# Patient Record
Sex: FEMALE | Race: BLACK OR AFRICAN AMERICAN | Employment: UNEMPLOYED | URBAN - METROPOLITAN AREA
[De-identification: names, ages, dates, MRNs, and addresses within clinical notes are randomized per-mention and may not be internally consistent; named-entity substitution may affect disease eponyms.]

---

## 2018-06-10 ENCOUNTER — ED HISTORICAL/CONVERTED ENCOUNTER (OUTPATIENT)
Dept: OTHER | Age: 23
End: 2018-06-10

## 2018-08-08 ENCOUNTER — ED HISTORICAL/CONVERTED ENCOUNTER (OUTPATIENT)
Dept: OTHER | Age: 23
End: 2018-08-08

## 2018-10-12 ENCOUNTER — ED HISTORICAL/CONVERTED ENCOUNTER (OUTPATIENT)
Dept: OTHER | Age: 23
End: 2018-10-12

## 2021-08-24 ENCOUNTER — HOSPITAL ENCOUNTER (EMERGENCY)
Age: 26
Discharge: HOME | End: 2021-08-24
Attending: EMERGENCY MEDICINE
Payer: MEDICAID

## 2021-08-24 VITALS
HEART RATE: 97 BPM | OXYGEN SATURATION: 100 % | TEMPERATURE: 98.6 F | HEIGHT: 67 IN | DIASTOLIC BLOOD PRESSURE: 72 MMHG | SYSTOLIC BLOOD PRESSURE: 107 MMHG | RESPIRATION RATE: 18 BRPM | BODY MASS INDEX: 34.06 KG/M2 | WEIGHT: 217 LBS

## 2021-08-24 DIAGNOSIS — A59.01 TRICHOMONAS VAGINITIS: Primary | ICD-10-CM

## 2021-08-24 LAB
APPEARANCE UR: CLEAR
BILIRUB UR QL: NEGATIVE
COLOR UR: YELLOW
GLUCOSE UR STRIP.AUTO-MCNC: >1000 MG/DL
HCG UR QL: NEGATIVE
HGB UR QL STRIP: NEGATIVE
KETONES UR QL STRIP.AUTO: 15 MG/DL
KOH PREP SPEC: NORMAL
LEUKOCYTE ESTERASE UR QL STRIP.AUTO: NEGATIVE
NITRITE UR QL STRIP.AUTO: NEGATIVE
PH UR STRIP: 5 [PH] (ref 5–8)
PROT UR STRIP-MCNC: NEGATIVE MG/DL
SP GR UR REFRACTOMETRY: 1.02 (ref 1–1.03)
SPECIAL REQUESTS,SREQ: NORMAL
TRICHOMONAS RAPID AG, TRICR: POSITIVE
UROBILINOGEN UR QL STRIP.AUTO: 0.1 EU/DL (ref 0.2–1)

## 2021-08-24 PROCEDURE — 99283 EMERGENCY DEPT VISIT LOW MDM: CPT

## 2021-08-24 PROCEDURE — 87491 CHLMYD TRACH DNA AMP PROBE: CPT

## 2021-08-24 PROCEDURE — 87210 SMEAR WET MOUNT SALINE/INK: CPT

## 2021-08-24 PROCEDURE — 81003 URINALYSIS AUTO W/O SCOPE: CPT

## 2021-08-24 PROCEDURE — 74011250637 HC RX REV CODE- 250/637: Performed by: EMERGENCY MEDICINE

## 2021-08-24 PROCEDURE — 81025 URINE PREGNANCY TEST: CPT

## 2021-08-24 PROCEDURE — 87808 TRICHOMONAS ASSAY W/OPTIC: CPT

## 2021-08-24 RX ORDER — DOXYCYCLINE 100 MG/1
100 CAPSULE ORAL ONCE
Status: COMPLETED | OUTPATIENT
Start: 2021-08-24 | End: 2021-08-24

## 2021-08-24 RX ORDER — DOXYCYCLINE 100 MG/1
100 CAPSULE ORAL 2 TIMES DAILY
Qty: 14 CAPSULE | Refills: 0 | Status: SHIPPED | OUTPATIENT
Start: 2021-08-24

## 2021-08-24 RX ORDER — MEDROXYPROGESTERONE ACETATE 150 MG/ML
150 INJECTION, SUSPENSION INTRAMUSCULAR ONCE
COMMUNITY

## 2021-08-24 RX ORDER — VENLAFAXINE 75 MG/1
25 TABLET ORAL 3 TIMES DAILY
COMMUNITY

## 2021-08-24 RX ORDER — INSULIN GLARGINE 100 [IU]/ML
20 INJECTION, SOLUTION SUBCUTANEOUS
COMMUNITY

## 2021-08-24 RX ORDER — METRONIDAZOLE 500 MG/1
2000 TABLET ORAL
Status: COMPLETED | OUTPATIENT
Start: 2021-08-24 | End: 2021-08-24

## 2021-08-24 RX ORDER — ATORVASTATIN CALCIUM 20 MG/1
TABLET, FILM COATED ORAL DAILY
COMMUNITY

## 2021-08-24 RX ORDER — METFORMIN HYDROCHLORIDE 850 MG/1
850 TABLET ORAL 2 TIMES DAILY WITH MEALS
COMMUNITY

## 2021-08-24 RX ORDER — OMEPRAZOLE 40 MG/1
40 CAPSULE, DELAYED RELEASE ORAL DAILY
COMMUNITY

## 2021-08-24 RX ADMIN — DOXYCYCLINE HYCLATE 100 MG: 100 CAPSULE ORAL at 02:14

## 2021-08-24 RX ADMIN — METRONIDAZOLE 2000 MG: 500 TABLET ORAL at 02:14

## 2021-08-24 NOTE — ED PROVIDER NOTES
EMERGENCY DEPARTMENT HISTORY AND PHYSICAL EXAM      Date: 2021  Patient Name: Gwendel Felty    History of Presenting Illness     Chief Complaint   Patient presents with    Vaginal Pain       History Provided By: Patient    HPI: Gwendel Felty, 27 y.o. female   presents to the ED with cc of vaginal discharge. Patient complains of vaginal discharge and irritation for last several days. Patient is recently had unprotected sexual intercourse. Patient states that she had a vaginal yeast infection before with a similar symptom. Patient was recently switched to insulin from Metformin for her diabetes and the Accu-Chek has been fluctuating from 200-400. No fever chills. No abdominal pain. PCP: None    No current facility-administered medications on file prior to encounter. Current Outpatient Medications on File Prior to Encounter   Medication Sig Dispense Refill    omeprazole (PRILOSEC) 40 mg capsule Take 40 mg by mouth daily.  atorvastatin (LIPITOR) 20 mg tablet Take  by mouth daily.  metFORMIN (GLUCOPHAGE) 850 mg tablet Take 850 mg by mouth two (2) times daily (with meals).  venlafaxine (EFFEXOR) 75 mg tablet Take 25 mg by mouth three (3) times daily.  insulin lispro protamine/insulin lispro (HumaLOG Mix 75-25,U-100,Insuln) 100 unit/mL (75-25) injection by SubCUTAneous route.  insulin glargine (Lantus Solostar U-100 Insulin) 100 unit/mL (3 mL) inpn 20 Units by SubCUTAneous route nightly.  medroxyPROGESTERone (DEPO-PROVERA) 150 mg/mL syrg 150 mg by IntraMUSCular route once. Past History     Past Medical History:  Past Medical History:   Diagnosis Date    Anxiety     Depression     Diabetes ([address]     GERD (gastroesophageal reflux disease)     High cholesterol     Hypertension        Past Surgical History:  Past Surgical History:   Procedure Laterality Date    HX  SECTION         Family History:  History reviewed.  No pertinent family history. Social History:  Social History     Tobacco Use    Smoking status: Current Every Day Smoker    Smokeless tobacco: Never Used   Substance Use Topics    Alcohol use: Not Currently    Drug use: Not Currently       Allergies:  No Known Allergies      Review of Systems   Review of Systems   Constitutional: Negative for chills and fever. HENT: Negative for sore throat. Respiratory: Negative for shortness of breath. Cardiovascular: Negative for chest pain. Gastrointestinal: Negative for nausea. Endocrine: Negative for polyuria. Genitourinary: Negative for dysuria. Skin: Negative for rash. Neurological: Negative for headaches. Psychiatric/Behavioral: Negative for suicidal ideas. All other systems reviewed and are negative. Physical Exam   Physical Exam  Vitals and nursing note reviewed. Constitutional:       Appearance: Normal appearance. HENT:      Head: Normocephalic and atraumatic. Nose: Nose normal.      Mouth/Throat:      Mouth: Mucous membranes are moist.      Pharynx: Oropharynx is clear. Eyes:      Conjunctiva/sclera: Conjunctivae normal.   Cardiovascular:      Rate and Rhythm: Normal rate and regular rhythm. Heart sounds: Normal heart sounds. Pulmonary:      Effort: Pulmonary effort is normal.      Breath sounds: Normal breath sounds. Abdominal:      General: Abdomen is flat. Bowel sounds are normal.      Palpations: Abdomen is soft. Musculoskeletal:      Cervical back: Neck supple. Right lower leg: No edema. Left lower leg: No edema. Skin:     General: Skin is warm and dry. Neurological:      General: No focal deficit present. Mental Status: She is alert and oriented to person, place, and time.    Psychiatric:         Mood and Affect: Mood normal.         Diagnostic Study Results     Labs -     Recent Results (from the past 12 hour(s))   HCG URINE, QL    Collection Time: 08/24/21  1:25 AM   Result Value Ref Range    HCG urine, QL Negative Negative     URINALYSIS W/ RFLX MICROSCOPIC    Collection Time: 08/24/21  1:25 AM   Result Value Ref Range    Color Yellow      Appearance Clear Clear      Specific gravity 1.020 1.003 - 1.030      pH (UA) 5.0 5.0 - 8.0      Protein Negative Negative mg/dL    Glucose >1,000 (A) Negative mg/dL    Ketone 15 (A) Negative mg/dL    Bilirubin Negative Negative      Blood Negative Negative      Urobilinogen 0.1 (L) 0.2 - 1.0 EU/dL    Nitrites Negative Negative      Leukocyte Esterase Negative Negative     KOH, OTHER SOURCES    Collection Time: 08/24/21  1:30 AM    Specimen: Vagina; Other   Result Value Ref Range    Special Requests: No Special Requests      KOH No yeast seen     TRICHOMONAS RAPID AG    Collection Time: 08/24/21  1:30 AM   Result Value Ref Range    Trichomonas, rapid Ag Positive (A) Negative         Radiologic Studies -   No orders to display     CT Results  (Last 48 hours)    None        CXR Results  (Last 48 hours)    None            Medical Decision Making   I am the first provider for this patient. I reviewed the vital signs, available nursing notes, past medical history, past surgical history, family history and social history. Vital Signs-Reviewed the patient's vital signs. Patient Vitals for the past 12 hrs:   Temp Pulse Resp BP SpO2   08/24/21 0112 98.6 °F (37 °C) 97 18 107/72 100 %       Records Reviewed:     Provider Notes (Medical Decision Making):       ED Course:   Initial assessment performed. The patients presenting problems have been discussed, and they are in agreement with the care plan formulated and outlined with them. I have encouraged them to ask questions as they arise throughout their visit. PROCEDURES      Disposition: Condition stable   DC- Adult Discharges: All of the diagnostic tests were reviewed and questions answered. Diagnosis, care plan and treatment options were discussed. understand instructions and will follow up as directed.  The patients results have been reviewed with them. They have been counseled regarding their diagnosis. The patient verbally convey understanding and agreement of the signs, symptoms, diagnosis, treatment and prognosis and additionally agrees to follow up as recommended. They also agree with the care-plan and convey that all of their questions have been answered. I have also put together some discharge instructions for them that include: 1) educational information regarding their diagnosis, 2) how to care for their diagnosis at home, as well a 3) list of reasons why they would want to return to the ED prior to their follow-up appointment, should their condition change. PLAN:  1. Current Discharge Medication List      START taking these medications    Details   doxycycline (VIBRAMYCIN) 100 mg capsule Take 1 Capsule by mouth two (2) times a day. Qty: 14 Capsule, Refills: 0  Start date: 8/24/2021           2. Follow-up Information     Follow up With Specialties Details Why Contact Info    Follow up with your primary care physician  Schedule an appointment as soon as possible for a visit in 3 days As needed         Return to ED if worse     Diagnosis     Clinical Impression:   1. Trichomonas vaginitis        Please note that this dictation was completed with [employer], the computer voice recognition software. Quite often unanticipated grammatical, syntax, homophones, and other interpretive errors are inadvertently transcribed by the computer software. Please disregard these errors. Please excuse any errors that have escaped final proofreading. Thank you.

## 2021-08-26 LAB
C TRACH RRNA SPEC QL NAA+PROBE: NEGATIVE
N GONORRHOEA RRNA SPEC QL NAA+PROBE: NEGATIVE
PLEASE NOTE:, 188601: NORMAL
SPECIMEN SOURCE: NORMAL

## 2021-12-05 ENCOUNTER — HOSPITAL ENCOUNTER (EMERGENCY)
Age: 26
Discharge: HOME | End: 2021-12-05
Attending: EMERGENCY MEDICINE | Admitting: EMERGENCY MEDICINE
Payer: MEDICAID

## 2021-12-05 VITALS
HEIGHT: 67 IN | OXYGEN SATURATION: 98 % | DIASTOLIC BLOOD PRESSURE: 62 MMHG | RESPIRATION RATE: 18 BRPM | SYSTOLIC BLOOD PRESSURE: 117 MMHG | TEMPERATURE: 98.5 F | BODY MASS INDEX: 34.06 KG/M2 | HEART RATE: 74 BPM | WEIGHT: 217 LBS

## 2021-12-05 DIAGNOSIS — N89.8 VAGINAL ITCHING: Primary | ICD-10-CM

## 2021-12-05 DIAGNOSIS — B37.31 VAGINAL YEAST INFECTION: ICD-10-CM

## 2021-12-05 LAB
APPEARANCE UR: ABNORMAL
BACTERIA URNS QL MICRO: ABNORMAL /HPF
BILIRUB UR QL: NEGATIVE
COLOR UR: YELLOW
EPITH CASTS URNS QL MICRO: ABNORMAL /LPF
GLUCOSE UR STRIP.AUTO-MCNC: >1000 MG/DL
HCG UR QL: NEGATIVE
HGB UR QL STRIP: NEGATIVE
KETONES UR QL STRIP.AUTO: 15 MG/DL
KOH PREP SPEC: NORMAL
LEUKOCYTE ESTERASE UR QL STRIP.AUTO: NEGATIVE
NITRITE UR QL STRIP.AUTO: NEGATIVE
PH UR STRIP: 5 [PH] (ref 5–8)
PROT UR STRIP-MCNC: NEGATIVE MG/DL
RBC #/AREA URNS HPF: ABNORMAL /HPF (ref 0–5)
SP GR UR REFRACTOMETRY: 1.02 (ref 1–1.03)
SPECIAL REQUESTS,SREQ: NORMAL
TRICHOMONAS RAPID AG, TRICR: NEGATIVE
UA: UC IF INDICATED,UAUC: ABNORMAL
UROBILINOGEN UR QL STRIP.AUTO: 0.1 EU/DL (ref 0.2–1)
WBC URNS QL MICRO: ABNORMAL /HPF (ref 0–4)

## 2021-12-05 PROCEDURE — 87808 TRICHOMONAS ASSAY W/OPTIC: CPT

## 2021-12-05 PROCEDURE — 99284 EMERGENCY DEPT VISIT MOD MDM: CPT

## 2021-12-05 PROCEDURE — 87210 SMEAR WET MOUNT SALINE/INK: CPT

## 2021-12-05 PROCEDURE — 87491 CHLMYD TRACH DNA AMP PROBE: CPT

## 2021-12-05 PROCEDURE — 74011250637 HC RX REV CODE- 250/637: Performed by: EMERGENCY MEDICINE

## 2021-12-05 PROCEDURE — 81001 URINALYSIS AUTO W/SCOPE: CPT

## 2021-12-05 PROCEDURE — 81025 URINE PREGNANCY TEST: CPT

## 2021-12-05 RX ORDER — HYDROCORTISONE 0.5 %
OINTMENT (GRAM) TOPICAL 2 TIMES DAILY
Qty: 15 G | Refills: 1 | Status: SHIPPED | OUTPATIENT
Start: 2021-12-05 | End: 2021-12-15

## 2021-12-05 RX ORDER — ACETAMINOPHEN 500 MG
1000 TABLET ORAL ONCE
Status: COMPLETED | OUTPATIENT
Start: 2021-12-05 | End: 2021-12-05

## 2021-12-05 RX ORDER — FLUCONAZOLE 100 MG/1
150 TABLET ORAL
Status: COMPLETED | OUTPATIENT
Start: 2021-12-05 | End: 2021-12-05

## 2021-12-05 RX ORDER — DIPHENHYDRAMINE HCL 25 MG
50 CAPSULE ORAL ONCE
Status: COMPLETED | OUTPATIENT
Start: 2021-12-05 | End: 2021-12-05

## 2021-12-05 RX ORDER — FLUCONAZOLE 150 MG/1
150 TABLET ORAL
Qty: 1 TABLET | Refills: 0 | Status: SHIPPED | OUTPATIENT
Start: 2021-12-05 | End: 2021-12-05

## 2021-12-05 RX ORDER — CHLORPHENIRAMINE MALEATE 4 MG
TABLET ORAL 2 TIMES DAILY
Qty: 15 G | Refills: 0 | Status: SHIPPED | OUTPATIENT
Start: 2021-12-05 | End: 2022-01-04

## 2021-12-05 RX ORDER — DIPHENHYDRAMINE HCL 25 MG
50 CAPSULE ORAL
Qty: 10 CAPSULE | Refills: 0 | Status: SHIPPED | OUTPATIENT
Start: 2021-12-05 | End: 2021-12-15

## 2021-12-05 RX ORDER — IBUPROFEN 800 MG/1
800 TABLET ORAL ONCE
Status: COMPLETED | OUTPATIENT
Start: 2021-12-05 | End: 2021-12-05

## 2021-12-05 RX ADMIN — DIPHENHYDRAMINE HYDROCHLORIDE 50 MG: 25 CAPSULE ORAL at 23:03

## 2021-12-05 RX ADMIN — ACETAMINOPHEN 1000 MG: 500 TABLET ORAL at 23:03

## 2021-12-05 RX ADMIN — IBUPROFEN 800 MG: 800 TABLET, FILM COATED ORAL at 23:03

## 2021-12-05 RX ADMIN — FLUCONAZOLE 150 MG: 100 TABLET ORAL at 23:03

## 2021-12-06 NOTE — ED PROVIDER NOTES
EMERGENCY DEPARTMENT HISTORY AND PHYSICAL EXAM      Date: 12/5/2021  Patient Name: Reilly Richardson    History of Presenting Illness     Chief Complaint   Patient presents with    Vaginal Itching       History Provided By: Patient    HPI: Reilly Richardson, 27 y.o. female   presents to the ED with cc of itching. Patient complains of vaginal itching for last several days. Patient planes of mild white vaginal discharge. Patient has insulin-dependent diabetes and has been compliant with insulin. Patient complains of urinary frequency without dysuria. No fever chills. No nausea vomiting diarrhea. Patient requests a prescription for tramadol for vaginal itching stating that a local ER recently prescribed her tramadol for the vaginal itching. PCP: None    No current facility-administered medications on file prior to encounter. Current Outpatient Medications on File Prior to Encounter   Medication Sig Dispense Refill    omeprazole (PRILOSEC) 40 mg capsule Take 40 mg by mouth daily.  atorvastatin (LIPITOR) 20 mg tablet Take  by mouth daily.  metFORMIN (GLUCOPHAGE) 850 mg tablet Take 850 mg by mouth two (2) times daily (with meals).  venlafaxine (EFFEXOR) 75 mg tablet Take 25 mg by mouth three (3) times daily.  insulin lispro protamine/insulin lispro (HumaLOG Mix 75-25,U-100,Insuln) 100 unit/mL (75-25) injection by SubCUTAneous route.  insulin glargine (Lantus Solostar U-100 Insulin) 100 unit/mL (3 mL) inpn 20 Units by SubCUTAneous route nightly.  medroxyPROGESTERone (DEPO-PROVERA) 150 mg/mL syrg 150 mg by IntraMUSCular route once.  doxycycline (VIBRAMYCIN) 100 mg capsule Take 1 Capsule by mouth two (2) times a day.  14 Capsule 0       Past History     Past Medical History:  Past Medical History:   Diagnosis Date    Anxiety     Depression     Diabetes ([address]     GERD (gastroesophageal reflux disease)     High cholesterol     Hypertension        Past Surgical History:  Past Surgical History:   Procedure Laterality Date    HX  SECTION         Family History:  History reviewed. No pertinent family history. Social History:  Social History     Tobacco Use    Smoking status: Current Every Day Smoker     Packs/day: 1.00    Smokeless tobacco: Never Used   Substance Use Topics    Alcohol use: Not Currently    Drug use: Not Currently       Allergies:  No Known Allergies      Review of Systems   Review of Systems   Constitutional: Negative for chills and fever. Respiratory: Negative for shortness of breath. Cardiovascular: Negative for chest pain. Gastrointestinal: Negative for nausea. Endocrine: Negative for polyuria. Genitourinary: Negative for dysuria. Skin: Negative for rash. Neurological: Negative for headaches. Physical Exam   Physical Exam  Vitals and nursing note reviewed. Constitutional:       General: She is not in acute distress. Appearance: Normal appearance. She is well-developed. She is not ill-appearing or toxic-appearing. HENT:      Head: Normocephalic and atraumatic. Nose: No congestion. Mouth/Throat:      Mouth: Mucous membranes are moist.   Eyes:      Conjunctiva/sclera: Conjunctivae normal.   Cardiovascular:      Rate and Rhythm: Regular rhythm. Heart sounds: Normal heart sounds. Pulmonary:      Effort: Pulmonary effort is normal.      Breath sounds: Normal breath sounds. Abdominal:      General: Bowel sounds are normal.      Palpations: Abdomen is soft. Tenderness: There is no abdominal tenderness. Musculoskeletal:      Cervical back: Neck supple. Skin:     General: Skin is warm and dry. Neurological:      General: No focal deficit present. Mental Status: She is alert.    Psychiatric:         Mood and Affect: Mood normal.         Diagnostic Study Results     Labs -     Recent Results (from the past 12 hour(s))   URINALYSIS W/ REFLEX CULTURE    Collection Time: 21 10:00 PM Specimen: Urine   Result Value Ref Range    Color Yellow      Appearance Hazy (A) Clear      Specific gravity 1.020 1.003 - 1.030      pH (UA) 5.0 5.0 - 8.0      Protein Negative Negative mg/dL    Glucose >1,000 (A) Negative mg/dL    Ketone 15 (A) Negative mg/dL    Bilirubin Negative Negative      Blood Negative Negative      Urobilinogen 0.1 (L) 0.2 - 1.0 EU/dL    Nitrites Negative Negative      Leukocyte Esterase Negative Negative      WBC 0-4 0 - 4 /hpf    RBC 0-5 0 - 5 /hpf    Epithelial cells Few Few /lpf    Bacteria 2+ (A) Negative /hpf    UA:UC IF INDICATED Culture not indicated by UA result Culture not indicated by UA result     HCG URINE, QL    Collection Time: 12/05/21 10:00 PM   Result Value Ref Range    HCG urine, QL Negative Negative     KOH, OTHER SOURCES    Collection Time: 12/05/21 10:00 PM    Specimen: Vagina; Other   Result Value Ref Range    Special Requests: No Special Requests      RISHI Few yeast seen/hpf     TRICHOMONAS RAPID AG    Collection Time: 12/05/21 10:25 PM   Result Value Ref Range    Trichomonas, rapid Ag Negative Negative         Radiologic Studies -   No orders to display     CT Results  (Last 48 hours)    None        CXR Results  (Last 48 hours)    None            Medical Decision Making   I am the first provider for this patient. I reviewed the vital signs, available nursing notes, past medical history, past surgical history, family history and social history. Vital Signs-Reviewed the patient's vital signs. Patient Vitals for the past 12 hrs:   Temp Pulse Resp BP SpO2   12/05/21 2202 98.5 °F (36.9 °C) 74 18 117/62 98 %       Records Reviewed:     Provider Notes (Medical Decision Making):       ED Course:   Initial assessment performed. The patients presenting problems have been discussed, and they are in agreement with the care plan formulated and outlined with them. I have encouraged them to ask questions as they arise throughout their visit. PROCEDURES      Disposition: Condition stable   DC- Adult Discharges: All of the diagnostic tests were reviewed and questions answered. Diagnosis, care plan and treatment options were discussed. understand instructions and will follow up as directed. The patients results have been reviewed with them. They have been counseled regarding their diagnosis. The patient verbally convey understanding and agreement of the signs, symptoms, diagnosis, treatment and prognosis and additionally agrees to follow up as recommended. They also agree with the care-plan and convey that all of their questions have been answered. I have also put together some discharge instructions for them that include: 1) educational information regarding their diagnosis, 2) how to care for their diagnosis at home, as well a 3) list of reasons why they would want to return to the ED prior to their follow-up appointment, should their condition change. PLAN:  1. Current Discharge Medication List      START taking these medications    Details   fluconazole (Diflucan) 150 mg tablet Take 1 Tablet by mouth now for 1 dose. FDA advises cautious prescribing of oral fluconazole in pregnancy. Qty: 1 Tablet, Refills: 0  Start date: 12/5/2021, End date: 12/5/2021      clotrimazole (LOTRIMIN) 1 % topical cream Apply  to affected area two (2) times a day for 30 days. Apply twice a day for 2-4 weeks  Qty: 15 g, Refills: 0  Start date: 12/5/2021, End date: 1/4/2022      hydrocortisone (CORTIZONE) 0.5 % ointment Apply  to affected area two (2) times a day for 10 days. Apply to affected area  Qty: 15 g, Refills: 1  Start date: 12/5/2021, End date: 12/15/2021      diphenhydrAMINE (BenadryL) 25 mg capsule Take 2 Capsules by mouth nightly as needed for Itching for up to 10 days. Qty: 10 Capsule, Refills: 0  Start date: 12/5/2021, End date: 12/15/2021           2.    Follow-up Information     Follow up With Specialties Details Why Contact Info    Follow up with your primary care physician  Schedule an appointment as soon as possible for a visit in 3 days As needed         Return to ED if worse     Diagnosis     Clinical Impression:   1. Vaginal itching    2. Vaginal yeast infection        Please note that this dictation was completed with [employer], the computer voice recognition software. Quite often unanticipated grammatical, syntax, homophones, and other interpretive errors are inadvertently transcribed by the computer software. Please disregard these errors. Please excuse any errors that have escaped final proofreading. Thank you.

## 2021-12-06 NOTE — ED TRIAGE NOTES
Pt reports she was treated for STI a couple months back. Pt reports she has vaginal itching today. Pt reports she is still with the same sexual partner.

## 2022-07-18 ENCOUNTER — APPOINTMENT (OUTPATIENT)
Dept: ULTRASOUND IMAGING | Age: 27
End: 2022-07-18
Attending: NURSE PRACTITIONER
Payer: MEDICAID

## 2022-07-18 ENCOUNTER — HOSPITAL ENCOUNTER (EMERGENCY)
Age: 27
Discharge: HOME | End: 2022-07-18
Attending: EMERGENCY MEDICINE
Payer: MEDICAID

## 2022-07-18 VITALS
SYSTOLIC BLOOD PRESSURE: 133 MMHG | OXYGEN SATURATION: 99 % | HEART RATE: 96 BPM | RESPIRATION RATE: 18 BRPM | HEIGHT: 67 IN | WEIGHT: 245 LBS | BODY MASS INDEX: 38.45 KG/M2 | DIASTOLIC BLOOD PRESSURE: 88 MMHG | TEMPERATURE: 97.9 F

## 2022-07-18 DIAGNOSIS — N64.4 BREAST PAIN, RIGHT: Primary | ICD-10-CM

## 2022-07-18 PROCEDURE — 74011250637 HC RX REV CODE- 250/637: Performed by: NURSE PRACTITIONER

## 2022-07-18 PROCEDURE — 76604 US EXAM CHEST: CPT

## 2022-07-18 PROCEDURE — 99284 EMERGENCY DEPT VISIT MOD MDM: CPT

## 2022-07-18 RX ORDER — HYDROCODONE BITARTRATE AND ACETAMINOPHEN 5; 325 MG/1; MG/1
2 TABLET ORAL
Status: COMPLETED | OUTPATIENT
Start: 2022-07-18 | End: 2022-07-18

## 2022-07-18 RX ORDER — HYDROCODONE BITARTRATE AND ACETAMINOPHEN 5; 325 MG/1; MG/1
1 TABLET ORAL
Qty: 12 TABLET | Refills: 0 | Status: SHIPPED | OUTPATIENT
Start: 2022-07-18 | End: 2022-07-21

## 2022-07-18 RX ADMIN — HYDROCODONE BITARTRATE AND ACETAMINOPHEN 2 TABLET: 5; 325 TABLET ORAL at 11:52

## 2022-07-18 NOTE — ED PROVIDER NOTES
EMERGENCY DEPARTMENT HISTORY AND PHYSICAL EXAM      Date: 2022  Patient Name: Rico Gambino    History of Presenting Illness     Chief Complaint   Patient presents with    Breast pain       History Provided By: Patient    HPI: Rico Gambino, 32 y.o. female with a significant past medical history of GERD, hyperlipidemia presents to the ED with breast pain. Patient's had right breast pain on and off for a while. Patient denies any fevers. Patient denies any drainage. Patient comes in for evaluation of her right breast pain. There are no other complaints, changes, or physical findings at this time. PCP: Noe Oswald MD    No current facility-administered medications on file prior to encounter. Current Outpatient Medications on File Prior to Encounter   Medication Sig Dispense Refill    omeprazole (PRILOSEC) 40 mg capsule Take 40 mg by mouth daily.  atorvastatin (LIPITOR) 20 mg tablet Take  by mouth daily.  metFORMIN (GLUCOPHAGE) 850 mg tablet Take 850 mg by mouth two (2) times daily (with meals).  venlafaxine (EFFEXOR) 75 mg tablet Take 25 mg by mouth three (3) times daily.  insulin lispro protamine/insulin lispro (HumaLOG Mix 75-25,U-100,Insuln) 100 unit/mL (75-25) injection by SubCUTAneous route.  insulin glargine (Lantus Solostar U-100 Insulin) 100 unit/mL (3 mL) inpn 20 Units by SubCUTAneous route nightly.  medroxyPROGESTERone (DEPO-PROVERA) 150 mg/mL syrg 150 mg by IntraMUSCular route once.  doxycycline (VIBRAMYCIN) 100 mg capsule Take 1 Capsule by mouth two (2) times a day. 14 Capsule 0       Past History     Past Medical History:  Past Medical History:   Diagnosis Date    Anxiety     Depression     Diabetes ([address]     GERD (gastroesophageal reflux disease)     High cholesterol     Hypertension        Past Surgical History:  Past Surgical History:   Procedure Laterality Date    HX  SECTION         Family History:  History reviewed. No pertinent family history. Social History:  Social History     Tobacco Use    Smoking status: Current Every Day Smoker     Packs/day: 1.00    Smokeless tobacco: Never Used   Substance Use Topics    Alcohol use: Not Currently    Drug use: Not Currently       Allergies:  No Known Allergies    Review of Systems   Review of Systems   Constitutional: Negative for chills, fatigue and fever. HENT: Negative for congestion, sinus pressure and trouble swallowing. Eyes: Negative for photophobia and pain. Respiratory: Negative for cough and shortness of breath. Cardiovascular: Positive for chest pain. Negative for leg swelling. Gastrointestinal: Negative for abdominal pain, diarrhea, nausea and vomiting. Endocrine: Negative for polydipsia, polyphagia and polyuria. Genitourinary: Negative for decreased urine volume, difficulty urinating, dysuria, hematuria and urgency. Musculoskeletal: Negative for back pain, gait problem, myalgias and neck pain. Skin: Negative for pallor and rash. Allergic/Immunologic: Negative for environmental allergies and food allergies. Neurological: Negative for dizziness, facial asymmetry, speech difficulty, numbness and headaches. Hematological: Negative for adenopathy. Does not bruise/bleed easily. Psychiatric/Behavioral: Negative for agitation, self-injury and suicidal ideas. The patient is not nervous/anxious. Physical Exam   Physical Exam  Vitals and nursing note reviewed. Exam conducted with a chaperone present. Constitutional:       Appearance: Normal appearance. HENT:      Head: Atraumatic. Right Ear: Tympanic membrane and external ear normal.      Left Ear: Tympanic membrane and external ear normal.      Nose: Nose normal.      Mouth/Throat:      Mouth: Mucous membranes are moist.   Eyes:      Extraocular Movements: Extraocular movements intact. Pupils: Pupils are equal, round, and reactive to light.    Cardiovascular:      Rate and Rhythm: Normal rate and regular rhythm. Pulses: Normal pulses. Heart sounds: Normal heart sounds. Pulmonary:      Breath sounds: Normal breath sounds. Chest:      Chest wall: Tenderness present. Abdominal:      General: Abdomen is flat. Palpations: Abdomen is soft. Musculoskeletal:         General: Normal range of motion. Cervical back: Normal range of motion and neck supple. Skin:     General: Skin is warm and dry. Capillary Refill: Capillary refill takes less than 2 seconds. Neurological:      General: No focal deficit present. Mental Status: She is alert and oriented to person, place, and time. Mental status is at baseline. Psychiatric:         Mood and Affect: Mood normal.         Behavior: Behavior normal.         Lab and Diagnostic Study Results   Labs -   No results found for this or any previous visit (from the past 12 hour(s)). Radiologic Studies -   @lastxrresult@  CT Results  (Last 48 hours)    None        CXR Results  (Last 48 hours)    None          Medical Decision Making and ED Course   - I am the first provider for this patient. I reviewed the vital signs, available nursing notes, past medical history, past surgical history, family history and social history. - Initial assessment performed. The patients presenting problems have been discussed, and they are in agreement with the care plan formulated and outlined with them. I have encouraged them to ask questions as they arise throughout their visit. Vital Signs-Reviewed the patient's vital signs. No data found. Differential Diagnosis & Medical Decision Making Provider Note:   Patient presents with breast pain. Differential diagnosis include breast-feeding, cystic breast, mastitis, abscess. Ultrasound showed no abnormality. Patient will follow-up with her OB/GYN.   [facility]     ED Course:        Procedures   Performed by: Katarina Wright NP  Procedures      Disposition   Disposition: DC- Adult Discharges: All of the diagnostic tests were reviewed and questions answered. Diagnosis, care plan and treatment options were discussed. The patient understands the instructions and will follow up as directed. The patients results have been reviewed with them. They have been counseled regarding their diagnosis. The patient verbally convey understanding and agreement of the signs, symptoms, diagnosis, treatment and prognosis and additionally agrees to follow up as recommended with their PCP in 24 - 48 hours. They also agree with the care-plan and convey that all of their questions have been answered. I have also put together some discharge instructions for them that include: 1) educational information regarding their diagnosis, 2) how to care for their diagnosis at home, as well a 3) list of reasons why they would want to return to the ED prior to their follow-up appointment, should their condition change. Discharged    DISCHARGE PLAN:  1. Current Discharge Medication List      CONTINUE these medications which have NOT CHANGED    Details   omeprazole (PRILOSEC) 40 mg capsule Take 40 mg by mouth daily. atorvastatin (LIPITOR) 20 mg tablet Take  by mouth daily. metFORMIN (GLUCOPHAGE) 850 mg tablet Take 850 mg by mouth two (2) times daily (with meals). venlafaxine (EFFEXOR) 75 mg tablet Take 25 mg by mouth three (3) times daily. insulin lispro protamine/insulin lispro (HumaLOG Mix 75-25,U-100,Insuln) 100 unit/mL (75-25) injection by SubCUTAneous route. insulin glargine (Lantus Solostar U-100 Insulin) 100 unit/mL (3 mL) inpn 20 Units by SubCUTAneous route nightly. medroxyPROGESTERone (DEPO-PROVERA) 150 mg/mL syrg 150 mg by IntraMUSCular route once. doxycycline (VIBRAMYCIN) 100 mg capsule Take 1 Capsule by mouth two (2) times a day. Qty: 14 Capsule, Refills: 0           2.    Follow-up Information     Follow up With Specialties Details Why Danyelle Mcadams MD Family Medicine   [address] [facility] [id number]  Rick Haywood, [address] Gynecology   [address] [place] [address]  [phone]          3. Return to ED if worse   4. Discharge Medication List as of 7/18/2022 12:50 PM      START taking these medications    Details   HYDROcodone-acetaminophen (Lorcet, HYDROcodone,) 5-325 mg per tablet Take 1 Tablet by mouth every six (6) hours as needed for Pain for up to 3 days. Max Daily Amount: 4 Tablets., Normal, Disp-12 Tablet, R-0         CONTINUE these medications which have NOT CHANGED    Details   omeprazole (PRILOSEC) 40 mg capsule Take 40 mg by mouth daily. , Historical Med      atorvastatin (LIPITOR) 20 mg tablet Take  by mouth daily. , Historical Med      metFORMIN (GLUCOPHAGE) 850 mg tablet Take 850 mg by mouth two (2) times daily (with meals). , Historical Med      venlafaxine (EFFEXOR) 75 mg tablet Take 25 mg by mouth three (3) times daily. , Historical Med      insulin lispro protamine/insulin lispro (HumaLOG Mix 75-25,U-100,Insuln) 100 unit/mL (75-25) injection by SubCUTAneous route., Historical Med      insulin glargine (Lantus Solostar U-100 Insulin) 100 unit/mL (3 mL) inpn 20 Units by SubCUTAneous route nightly., Historical Med      medroxyPROGESTERone (DEPO-PROVERA) 150 mg/mL syrg 150 mg by IntraMUSCular route once., Historical Med      doxycycline (VIBRAMYCIN) 100 mg capsule Take 1 Capsule by mouth two (2) times a day., Normal, Disp-14 Capsule, R-0               Diagnosis/Clinical Impression     Clinical Impression:   1. Breast pain, right        Attestations: Nat Osuna NP    Please note that this dictation was completed with [employer], the [employer] voice recognition software. Quite often unanticipated grammatical, syntax, homophones, and other interpretive errors are inadvertently transcribed by the computer software. Please disregard these errors.   Please excuse any errors that have escaped final proofreading. Thank you.

## 2022-07-18 NOTE — Clinical Note
[facility] EMERGENCY DEPT  [address] [place] [id number] [phone]    Work/School Note    Date: 7/18/2022    To Whom It May concern:    Tavares Gibbons was seen and treated today in the emergency room by the following provider(s):  Attending Provider: Liana Garcia MD  Nurse Practitioner: Asmita Gr NP. Tavares Gibbons is excused from work/school on 07/18/22 and 07/19/22. She is medically clear to return to work/school on 7/20/2022.        Sincerely,          Hieu Rice NP

## 2022-08-29 ENCOUNTER — HOSPITAL ENCOUNTER (EMERGENCY)
Age: 27
Discharge: HOME | End: 2022-08-29
Attending: EMERGENCY MEDICINE
Payer: MEDICAID

## 2022-08-29 VITALS
BODY MASS INDEX: 39.24 KG/M2 | SYSTOLIC BLOOD PRESSURE: 122 MMHG | TEMPERATURE: 98.3 F | HEIGHT: 67 IN | HEART RATE: 83 BPM | WEIGHT: 250 LBS | RESPIRATION RATE: 16 BRPM | DIASTOLIC BLOOD PRESSURE: 74 MMHG | OXYGEN SATURATION: 94 %

## 2022-08-29 DIAGNOSIS — F41.8 ANXIETY ABOUT HEALTH: ICD-10-CM

## 2022-08-29 DIAGNOSIS — N76.4 VULVAR ABSCESS: Primary | ICD-10-CM

## 2022-08-29 LAB — HCG UR QL: NEGATIVE

## 2022-08-29 PROCEDURE — 74011250637 HC RX REV CODE- 250/637

## 2022-08-29 PROCEDURE — 99283 EMERGENCY DEPT VISIT LOW MDM: CPT

## 2022-08-29 PROCEDURE — 81025 URINE PREGNANCY TEST: CPT

## 2022-08-29 RX ORDER — LIDOCAINE HYDROCHLORIDE 10 MG/ML
INJECTION INFILTRATION; PERINEURAL
Status: DISCONTINUED
Start: 2022-08-29 | End: 2022-08-29 | Stop reason: HOSPADM

## 2022-08-29 RX ORDER — LIDOCAINE HYDROCHLORIDE 10 MG/ML
10 INJECTION INFILTRATION; PERINEURAL ONCE
Status: DISCONTINUED | OUTPATIENT
Start: 2022-08-29 | End: 2022-08-29 | Stop reason: HOSPADM

## 2022-08-29 RX ORDER — CLINDAMYCIN HYDROCHLORIDE 300 MG/1
300 CAPSULE ORAL 4 TIMES DAILY
Qty: 28 CAPSULE | Refills: 0 | Status: SHIPPED | OUTPATIENT
Start: 2022-08-29 | End: 2022-09-05

## 2022-08-29 RX ORDER — TRAMADOL HYDROCHLORIDE 50 MG/1
50 TABLET ORAL
Status: COMPLETED | OUTPATIENT
Start: 2022-08-29 | End: 2022-08-29

## 2022-08-29 RX ORDER — TRAMADOL HYDROCHLORIDE 50 MG/1
50 TABLET ORAL
Qty: 20 TABLET | Refills: 0 | Status: SHIPPED | OUTPATIENT
Start: 2022-08-29 | End: 2022-09-03

## 2022-08-29 RX ORDER — HYDROCODONE BITARTRATE AND ACETAMINOPHEN 5; 325 MG/1; MG/1
1 TABLET ORAL ONCE
Status: COMPLETED | OUTPATIENT
Start: 2022-08-29 | End: 2022-08-29

## 2022-08-29 RX ADMIN — HYDROCODONE BITARTRATE AND ACETAMINOPHEN 1 TABLET: 5; 325 TABLET ORAL at 10:07

## 2022-08-29 RX ADMIN — TRAMADOL HYDROCHLORIDE 50 MG: 50 TABLET, COATED ORAL at 11:38

## 2022-08-29 NOTE — ED PROVIDER NOTES
EMERGENCY DEPARTMENT HISTORY AND PHYSICAL EXAM      Date: 8/29/2022  Patient Name: Rachel Gong    History of Presenting Illness     Chief Complaint   Patient presents with    Vaginal Pain       History Provided By: Patient    HPI: Rachel Gong, 32 y.o. female history of anxiety, depression, GERD, DM, HTN, and high cholesterol presents with vaginal pain. Patient states onset 2 days ago of sharp pain and noticeable bump on her left labia. She has applied warm compresses with some relief. She is taking Toradol, Tylenol, and ibuprofen. She denies fever, chills, nausea, diarrhea, vomiting, vaginal discharge, vaginal bleeding, hematuria, or rash. She has a history of the same on the opposite labia that was drained and healed successfully. She states has an appointment with her OB/GYN next week. There are no other complaints, changes, or physical findings at this time. PCP: Mikayla Huff MD    No current facility-administered medications on file prior to encounter. Current Outpatient Medications on File Prior to Encounter   Medication Sig Dispense Refill    omeprazole (PRILOSEC) 40 mg capsule Take 40 mg by mouth daily. atorvastatin (LIPITOR) 20 mg tablet Take  by mouth daily. metFORMIN (GLUCOPHAGE) 850 mg tablet Take 850 mg by mouth two (2) times daily (with meals). venlafaxine (EFFEXOR) 75 mg tablet Take 25 mg by mouth three (3) times daily. insulin lispro protamine/insulin lispro (HumaLOG Mix 75-25,U-100,Insuln) 100 unit/mL (75-25) injection by SubCUTAneous route. insulin glargine (Lantus Solostar U-100 Insulin) 100 unit/mL (3 mL) inpn 20 Units by SubCUTAneous route nightly. medroxyPROGESTERone (DEPO-PROVERA) 150 mg/mL syrg 150 mg by IntraMUSCular route once. doxycycline (VIBRAMYCIN) 100 mg capsule Take 1 Capsule by mouth two (2) times a day.  14 Capsule 0       Past History     Past Medical History:  Past Medical History:   Diagnosis Date    Anxiety Depression     Diabetes ([facility] [address]     GERD (gastroesophageal reflux disease)     High cholesterol     Hypertension        Past Surgical History:  Past Surgical History:   Procedure Laterality Date    HX  SECTION         Family History:  No family history on file. Social History:  Social History     Tobacco Use    Smoking status: Every Day     Packs/day: 1.00     Types: Cigarettes    Smokeless tobacco: Never   Substance Use Topics    Alcohol use: Not Currently    Drug use: Not Currently       Allergies:  No Known Allergies    Review of Systems   Review of Systems   Constitutional: Negative. Negative for chills and fever. HENT: Negative. Negative for congestion, mouth sores and sore throat. Respiratory: Negative. Negative for cough and shortness of breath. Cardiovascular: Negative. Negative for chest pain. Gastrointestinal: Negative. Negative for abdominal pain, diarrhea, nausea, rectal pain and vomiting. Genitourinary:  Positive for vaginal pain (Vaginal pain is related to swollen left labia. She denies vaginal canal pain. ). Negative for difficulty urinating, dysuria, flank pain, genital sores, vaginal bleeding and vaginal discharge. Musculoskeletal: Negative. Negative for back pain and myalgias. Skin: Negative. Neurological: Negative. Psychiatric/Behavioral: Negative. Physical Exam   Physical Exam  Vitals and nursing note reviewed. Constitutional:       General: She is not in acute distress. Appearance: She is not ill-appearing. HENT:      Head: Normocephalic. Nose: Nose normal. No congestion. Eyes:      Extraocular Movements: Extraocular movements intact. Cardiovascular:      Rate and Rhythm: Normal rate. Pulses: Normal pulses. Pulmonary:      Effort: Pulmonary effort is normal.   Abdominal:      Palpations: Abdomen is soft. Tenderness: There is no abdominal tenderness. There is no guarding. Genitourinary:     Vagina: No vaginal discharge. Comments: Approximate 1.75 cm area of swelling and tenderness. Musculoskeletal:         General: Normal range of motion. Cervical back: Normal range of motion. Lymphadenopathy:      Cervical: No cervical adenopathy. Skin:     General: Skin is warm and dry. Neurological:      General: No focal deficit present. Mental Status: She is alert. Psychiatric:         Mood and Affect: Mood normal.       Lab and Diagnostic Study Results   Labs -     Recent Results (from the past 12 hour(s))   HCG URINE, QL    Collection Time: 08/29/22 10:33 AM   Result Value Ref Range    HCG urine, QL Negative Negative         Radiologic Studies -   @lastxrresult@  CT Results  (Last 48 hours)      None          CXR Results  (Last 48 hours)      None            Medical Decision Making and ED Course   Differential Diagnosis & Medical Decision Making Provider Note:     - I am the first provider for this patient. I reviewed the vital signs, available nursing notes, past medical history, past surgical history, family history and social history. The patients presenting problems have been discussed, and they are in agreement with the care plan formulated and outlined with them. I have encouraged them to ask questions as they arise throughout their visit. Vital Signs-Reviewed the patient's vital signs. Patient Vitals for the past 24 hrs:   Temp Pulse Resp BP SpO2   08/29/22 0919 98.3 °F (36.8 °C) 83 16 122/74 94 %       ED Course:   ED Course as of 08/29/22 2212   [facility] Aug 29, 2022   1015 Patient declines I&D. She would like to treat with antibiotics and warm compresses until follow-up with OB/GYN. Offered to contact patient's OB/GYN to assist with establishment of appointment. Patient declined this assistance. Discussed importance of definitive management in setting of history of diabetes. Patient aware that she is at higher risk of complicated wound management with diabetic history.  [KW]   1056 Extensive conversation regarding pain management at home with patient. She states she is taking Toradol, ibuprofen, acetaminophen at home without relief. She is requesting stronger pain medicine. Again, offered to personally make appointment with patient's chosen OB/GYN for follow-up. She declined. [KW]   1472 Patient discussed with Dr. King Bain. Dr. Sid Marsh to bedside. [KW]      ED Course User Index  [KW] Francis Rodríguez NP   64-year-old female presents with left labia pain x2 days. She is well-appearing with stable vital signs. There are no indications of systemic infection. History and physical completed. Discussed I&D intervention. Patient requests no intervention, only pain management. Explained option for antibiotic treatment with pain management. Discussed importance of follow-up. Patient given Norco for pain. She states no relief. Tramadol given. Patient instructed to treat with warm compresses or sitz baths three to four times per day. she states she will follow-up with OB/GYN. Explicit return precautions discussed. Patient verbalized understanding and questions answered. Procedures   Performed by: Merry Butler NP  Procedures      Disposition   Disposition: DC- Adult Discharges: All of the diagnostic tests were reviewed and questions answered. Diagnosis, care plan and treatment options were discussed. The patient understands the instructions and will follow up as directed. The patients results have been reviewed with them. They have been counseled regarding their diagnosis. The patient verbally convey understanding and agreement of the signs, symptoms, diagnosis, treatment and prognosis and additionally agrees to follow up as recommended with their PCP in 24 - 48 hours. They also agree with the care-plan and convey that all of their questions have been answered.   I have also put together some discharge instructions for them that include: 1) educational information regarding their diagnosis, 2) how to care for their diagnosis at home, as well a 3) list of reasons why they would want to return to the ED prior to their follow-up appointment, should their condition change. DISCHARGE PLAN:  1. Current Discharge Medication List        CONTINUE these medications which have NOT CHANGED    Details   omeprazole (PRILOSEC) 40 mg capsule Take 40 mg by mouth daily. atorvastatin (LIPITOR) 20 mg tablet Take  by mouth daily. metFORMIN (GLUCOPHAGE) 850 mg tablet Take 850 mg by mouth two (2) times daily (with meals). venlafaxine (EFFEXOR) 75 mg tablet Take 25 mg by mouth three (3) times daily. insulin lispro protamine/insulin lispro (HumaLOG Mix 75-25,U-100,Insuln) 100 unit/mL (75-25) injection by SubCUTAneous route. insulin glargine (Lantus Solostar U-100 Insulin) 100 unit/mL (3 mL) inpn 20 Units by SubCUTAneous route nightly. medroxyPROGESTERone (DEPO-PROVERA) 150 mg/mL syrg 150 mg by IntraMUSCular route once. doxycycline (VIBRAMYCIN) 100 mg capsule Take 1 Capsule by mouth two (2) times a day. Qty: 14 Capsule, Refills: 0           2. Follow-up Information       Follow up With Specialties Details Why Shantanu Segura  Schedule an appointment as soon as possible for a visit   [address] [place] [address]  [phone]          3. Return to ED if worse   4. Discharge Medication List as of 8/29/2022 11:35 AM          Diagnosis/Clinical Impression     Clinical Impression:   1. Vulvar abscess    2. Anxiety about health        Attestations: Selma CHAKRABORTY NP, am the primary clinician of record. Please note that this dictation was completed with [employer], the [employer] voice recognition software. Quite often unanticipated grammatical, syntax, homophones, and other interpretive errors are inadvertently transcribed by the computer software. Please disregard these errors.   Please excuse any errors that have escaped final proofreading. Thank you.

## 2022-08-29 NOTE — ED TRIAGE NOTES
Patient presents to the ED with L sided vaginal pain and reports what she thinks she has a boil to the spot.  Denies burning with urination or any other symptoms

## 2022-09-01 ENCOUNTER — TELEPHONE (OUTPATIENT)
Dept: OBGYN CLINIC | Age: 27
End: 2022-09-01

## 2022-09-01 NOTE — TELEPHONE ENCOUNTER
NP Called was in er for pain in vagina has a knot on her vagina. Wants a sooner appointment if she can get in. Told patient i'd try and see as our schedule is booked out.

## 2022-09-01 NOTE — TELEPHONE ENCOUNTER
Spoke with the patient and she was seen at the ER earlier this week and diagnosed with a vulvar abscess.

## 2022-12-17 ENCOUNTER — HOSPITAL ENCOUNTER (EMERGENCY)
Age: 27
Discharge: HOME | End: 2022-12-17
Attending: EMERGENCY MEDICINE
Payer: MEDICAID

## 2022-12-17 VITALS
TEMPERATURE: 97.8 F | BODY MASS INDEX: 36.1 KG/M2 | HEIGHT: 67 IN | SYSTOLIC BLOOD PRESSURE: 136 MMHG | HEART RATE: 83 BPM | OXYGEN SATURATION: 98 % | RESPIRATION RATE: 16 BRPM | DIASTOLIC BLOOD PRESSURE: 94 MMHG | WEIGHT: 230 LBS

## 2022-12-17 DIAGNOSIS — K02.9 DENTAL CARIES: Primary | ICD-10-CM

## 2022-12-17 PROCEDURE — 74011250637 HC RX REV CODE- 250/637: Performed by: EMERGENCY MEDICINE

## 2022-12-17 PROCEDURE — 99283 EMERGENCY DEPT VISIT LOW MDM: CPT

## 2022-12-17 RX ORDER — NAPROXEN 500 MG/1
500 TABLET ORAL 2 TIMES DAILY WITH MEALS
Qty: 20 TABLET | Refills: 0 | Status: SHIPPED | OUTPATIENT
Start: 2022-12-17 | End: 2022-12-27

## 2022-12-17 RX ORDER — HYDROCODONE BITARTRATE AND ACETAMINOPHEN 5; 325 MG/1; MG/1
1 TABLET ORAL
Status: COMPLETED | OUTPATIENT
Start: 2022-12-17 | End: 2022-12-17

## 2022-12-17 RX ORDER — CLINDAMYCIN HYDROCHLORIDE 300 MG/1
300 CAPSULE ORAL 4 TIMES DAILY
Qty: 28 CAPSULE | Refills: 0 | Status: SHIPPED | OUTPATIENT
Start: 2022-12-17 | End: 2022-12-24

## 2022-12-17 RX ADMIN — HYDROCODONE BITARTRATE AND ACETAMINOPHEN 1 TABLET: 5; 325 TABLET ORAL at 14:18

## 2022-12-17 NOTE — ED PROVIDER NOTES
EMERGENCY DEPARTMENT HISTORY AND PHYSICAL EXAM      Date: 2022  Patient Name: Marry Cherry    History of Presenting Illness     Chief Complaint   Patient presents with    Dental Pain       History Provided By: Patient    HPI: Marry Cherry, 32 y.o. female with PMHx of anxiety,depression,DM,GERD and HTN who prsents to the ED c/o dental pain for 3 days. There are no other complaints, changes, or physical findings at this time. Past History     Past Medical History:  Past Medical History:   Diagnosis Date    Anxiety     Depression     Diabetes ([address]     GERD (gastroesophageal reflux disease)     High cholesterol     Hypertension        Past Surgical History:  Past Surgical History:   Procedure Laterality Date    HX  SECTION         Family History:  History reviewed. No pertinent family history. Social History:  Social History     Tobacco Use    Smoking status: Every Day     Packs/day: 1.00     Types: Cigarettes    Smokeless tobacco: Never   Substance Use Topics    Alcohol use: Not Currently    Drug use: Not Currently       Allergies:  No Known Allergies    PCP: Jada Patel MD    No current facility-administered medications on file prior to encounter. Current Outpatient Medications on File Prior to Encounter   Medication Sig Dispense Refill    omeprazole (PRILOSEC) 40 mg capsule Take 40 mg by mouth daily. atorvastatin (LIPITOR) 20 mg tablet Take  by mouth daily. metFORMIN (GLUCOPHAGE) 850 mg tablet Take 850 mg by mouth two (2) times daily (with meals). venlafaxine (EFFEXOR) 75 mg tablet Take 25 mg by mouth three (3) times daily. insulin lispro protamine/insulin lispro (HumaLOG Mix 75-25,U-100,Insuln) 100 unit/mL (75-25) injection by SubCUTAneous route. insulin glargine (Lantus Solostar U-100 Insulin) 100 unit/mL (3 mL) inpn 20 Units by SubCUTAneous route nightly. medroxyPROGESTERone (DEPO-PROVERA) 150 mg/mL syrg 150 mg by IntraMUSCular route once. doxycycline (VIBRAMYCIN) 100 mg capsule Take 1 Capsule by mouth two (2) times a day. 14 Capsule 0       Review of Systems   Review of Systems   Constitutional: Negative. HENT:  Positive for dental problem. Eyes: Negative. Respiratory: Negative. Cardiovascular: Negative. Gastrointestinal: Negative. Endocrine: Negative. Genitourinary: Negative. Musculoskeletal: Negative. Skin: Negative. Allergic/Immunologic: Negative. Neurological: Negative. Hematological: Negative. Psychiatric/Behavioral: Negative. Physical Exam   Physical Exam  Vitals and nursing note reviewed. Constitutional:       Appearance: Normal appearance. She is normal weight. HENT:      Head: Normocephalic and atraumatic. Nose: Nose normal.      Mouth/Throat:      Mouth: Mucous membranes are moist.      Pharynx: Oropharynx is clear. Comments: + dental caries   Pulmonary:      Breath sounds: Normal breath sounds. Skin:     General: Skin is warm and dry. Neurological:      General: No focal deficit present. Mental Status: She is alert and oriented to person, place, and time. Mental status is at baseline. Psychiatric:         Mood and Affect: Mood normal.         Behavior: Behavior normal.         Thought Content: Thought content normal.         Judgment: Judgment normal.       Lab and Diagnostic Study Results   Labs -   No results found for this or any previous visit (from the past 12 hour(s)). Radiologic Studies -   @lastxrresult@  CT Results  (Last 48 hours)      None          CXR Results  (Last 48 hours)      None            Medical Decision Making and ED Course   Differential Diagnosis & Medical Decision Making Provider Note:       - I am the first provider for this patient. I reviewed the vital signs, available nursing notes, past medical history, past surgical history, family history and social history.  The patients presenting problems have been discussed, and they are in agreement with the care plan formulated and outlined with them. I have encouraged them to ask questions as they arise throughout their visit. Vital Signs-Reviewed the patient's vital signs. Patient Vitals for the past 12 hrs:   Temp Pulse Resp BP SpO2   12/17/22 1317 97.8 °F (36.6 °C) 83 16 (!) 136/94 98 %       ED Course:          Procedures   Performed by: TIM Alicea  Procedures     Disposition   Disposition: Condition stable    DISCHARGE PLAN:  1. Current Discharge Medication List        CONTINUE these medications which have NOT CHANGED    Details   omeprazole (PRILOSEC) 40 mg capsule Take 40 mg by mouth daily. atorvastatin (LIPITOR) 20 mg tablet Take  by mouth daily. metFORMIN (GLUCOPHAGE) 850 mg tablet Take 850 mg by mouth two (2) times daily (with meals). venlafaxine (EFFEXOR) 75 mg tablet Take 25 mg by mouth three (3) times daily. insulin lispro protamine/insulin lispro (HumaLOG Mix 75-25,U-100,Insuln) 100 unit/mL (75-25) injection by SubCUTAneous route. insulin glargine (Lantus Solostar U-100 Insulin) 100 unit/mL (3 mL) inpn 20 Units by SubCUTAneous route nightly. medroxyPROGESTERone (DEPO-PROVERA) 150 mg/mL syrg 150 mg by IntraMUSCular route once. doxycycline (VIBRAMYCIN) 100 mg capsule Take 1 Capsule by mouth two (2) times a day. Qty: 14 Capsule, Refills: 0           2. Follow-up Information       Follow up With Specialties Details Why [facility] In 2 days  [address] [facility] [address]  [phone]          3. Return to ED if worse   4. Current Discharge Medication List         Remove if admitted/transferred    Diagnosis/Clinical Impression     Clinical Impression:   1. Dental caries        Attestations: Atilio CHAKRABORTY PA, am the primary clinician of record. Please note that this dictation was completed with [employer], the [employer] voice recognition software.   Quite often unanticipated grammatical, syntax, homophones, and other interpretive errors are inadvertently transcribed by the computer software. Please disregard these errors. Please excuse any errors that have escaped final proofreading. Thank you.

## 2022-12-17 NOTE — ED NOTES
Pt refused discharge due to Rx for pain Naproxen, \"I got that at home. \" Provider informed, no Narcotic Rx prescribed. Pt informed of Abx and Naproxen till able to see Dentist. Oleg Romero RN notified of Pt concern.

## 2022-12-17 NOTE — ED TRIAGE NOTES
Bottom Left tooth pain for 2 days, Hx of pain from same tooth. Working on Appt for dentist. Reports scraping mouth on crouton while eating salad.

## 2022-12-17 NOTE — ED NOTES
Pt requesting to speak to charge nurse, I was not able to see due to other high acuity patients. Per staff, pt upset because she was not able to get more narcotics here or prescribed. Pt specifically used the word \"narcotic\"  Discussed with Atilio who stated he was sticking with the current prescriptions prescribed. Per staff pt stated \"oh he pilar here about my a**, that's malpractice. \"

## 2023-01-02 ENCOUNTER — HOSPITAL ENCOUNTER (EMERGENCY)
Age: 28
Discharge: LEFT BEFORE DISCHARGE | End: 2023-01-02

## 2023-01-13 ENCOUNTER — HOSPITAL ENCOUNTER (EMERGENCY)
Age: 28
Discharge: HOME | End: 2023-01-13
Attending: EMERGENCY MEDICINE
Payer: MEDICAID

## 2023-01-13 VITALS
TEMPERATURE: 98.2 F | WEIGHT: 230 LBS | SYSTOLIC BLOOD PRESSURE: 123 MMHG | BODY MASS INDEX: 36.1 KG/M2 | HEART RATE: 95 BPM | HEIGHT: 67 IN | DIASTOLIC BLOOD PRESSURE: 91 MMHG | OXYGEN SATURATION: 100 % | RESPIRATION RATE: 18 BRPM

## 2023-01-13 DIAGNOSIS — L03.119 CELLULITIS AND ABSCESS OF LEG, EXCEPT FOOT: Primary | ICD-10-CM

## 2023-01-13 DIAGNOSIS — L02.419 CELLULITIS AND ABSCESS OF LEG, EXCEPT FOOT: Primary | ICD-10-CM

## 2023-01-13 PROCEDURE — 74011250637 HC RX REV CODE- 250/637: Performed by: EMERGENCY MEDICINE

## 2023-01-13 PROCEDURE — 99283 EMERGENCY DEPT VISIT LOW MDM: CPT

## 2023-01-13 RX ORDER — TRAMADOL HYDROCHLORIDE 50 MG/1
50 TABLET ORAL
Status: COMPLETED | OUTPATIENT
Start: 2023-01-13 | End: 2023-01-13

## 2023-01-13 RX ORDER — NAPROXEN 500 MG/1
500 TABLET ORAL
Qty: 20 TABLET | Refills: 0 | Status: SHIPPED | OUTPATIENT
Start: 2023-01-13

## 2023-01-13 RX ORDER — SULFAMETHOXAZOLE AND TRIMETHOPRIM 800; 160 MG/1; MG/1
2 TABLET ORAL 2 TIMES DAILY
Qty: 40 TABLET | Refills: 0 | Status: SHIPPED | OUTPATIENT
Start: 2023-01-13

## 2023-01-13 RX ADMIN — TRAMADOL HYDROCHLORIDE 50 MG: 50 TABLET, COATED ORAL at 01:23

## 2023-01-13 NOTE — ED PROVIDER NOTES
EMERGENCY DEPARTMENT HISTORY AND PHYSICAL EXAM      Date: 1/13/2023  Patient Name: Geovani Carpenter    History of Presenting Illness     Chief Complaint   Patient presents with    Skin Problem       History Provided By: Patient    HPI: Geovani Carpenter, 32 y.o. female presents to the ED with CC of draining abscess to her left inner thigh. Patient states been going on for the past couple of days and started to drain and she has been picking at it. She says that it is nearly dried out but still is mildly painful. Symptoms are somewhat better since it drained. She denies any fever, chills, nausea, vomiting, redness, swelling. .       Patient denies SOB, chest pain, or any neurological symptoms. There are no other complaints, changes, or physical findings at this time. Past History     Past Medical History:  Past Medical History:   Diagnosis Date    Anxiety     Depression     Diabetes ([address]     GERD (gastroesophageal reflux disease)     High cholesterol     Hypertension        Allergies:  No Known Allergies    Review of Systems   Vital signs and nursing notes reviewed  Review of Systems   Constitutional:  Negative for chills and fever. Genitourinary:  Negative for dyspareunia, dysuria, urgency, vaginal discharge and vaginal pain. Skin:  Positive for rash and wound. Physical Exam   Visit Vitals  BP (!) 123/91 (BP 1 Location: Right upper arm, BP Patient Position: At rest)   Pulse 95   Temp 98.2 °F (36.8 °C)   Resp 18   Ht 5' 7\" (1.702 m)   Wt 104.3 kg (230 lb)   SpO2 100%   BMI 36.02 kg/m²     CONSTITUTIONAL: Alert, in no distress. Appears stated age. Skin: Drained, nonindurated nonerythematous abscess to left inguinal area  Medical Decision Making     ED Course:   Initial assessment performed. The patients presenting problems have been discussed, and they are in agreement with the care plan formulated and outlined with them.   I have encouraged them to ask questions as they arise throughout their visit. Will treat with antibiotics to prophylax further worsening. Also discussed the use of Epsom salt to help dry out the wound. Critical Care Time: None    Disposition:  DISCHARGE NOTE:  The pt is ready for discharge. The pt's signs, symptoms, diagnosis, and discharge instructions have been discussed and pt has conveyed their understanding. The pt is to follow up as recommended or return to ER should their symptoms worsen. Plan has been discussed and pt is in agreement. PLAN:  1. Current Discharge Medication List        START taking these medications    Details   trimethoprim-sulfamethoxazole (Bactrim DS) 160-800 mg per tablet Take 2 Tablets by mouth two (2) times a day. Qty: 40 Tablet, Refills: 0  Start date: 1/13/2023      naproxen (NAPROSYN) 500 mg tablet Take 1 Tablet by mouth every twelve (12) hours as needed for Pain. Qty: 20 Tablet, Refills: 0  Start date: 1/13/2023           2. Follow-up Information       Follow up With Specialties Details Why Contact Info    Sigifredo Harp MD Family Medicine Call in 2 days for wound check [place] [address]  [phone]            3. Take Tylenol or Ibuprofen as needed  4. Drink plenty of fluids  5. Return to ED if worse especially if any shortness of breath, chest pain or altered mentation. Diagnosis     Clinical Impression:   1. Cellulitis and abscess of leg, except foot            Please note that this dictation was completed with [employer], the computer voice recognition software. Quite often unanticipated grammatical, syntax, homophones, and other interpretive errors are inadvertently transcribed by the computer software. Please   disregards these errors. Please excuse any errors that have escaped final proofreading.

## 2023-01-18 ENCOUNTER — HOSPITAL ENCOUNTER (EMERGENCY)
Age: 28
Discharge: HOME | End: 2023-01-18
Payer: MEDICAID

## 2023-01-18 VITALS
RESPIRATION RATE: 18 BRPM | OXYGEN SATURATION: 100 % | SYSTOLIC BLOOD PRESSURE: 131 MMHG | HEART RATE: 81 BPM | WEIGHT: 230 LBS | BODY MASS INDEX: 36.1 KG/M2 | HEIGHT: 67 IN | TEMPERATURE: 98.2 F | DIASTOLIC BLOOD PRESSURE: 72 MMHG

## 2023-01-18 DIAGNOSIS — N89.8 VAGINAL ITCHING: Primary | ICD-10-CM

## 2023-01-18 DIAGNOSIS — E11.65 UNCONTROLLED TYPE 2 DIABETES MELLITUS WITH HYPERGLYCEMIA (HCC): ICD-10-CM

## 2023-01-18 DIAGNOSIS — F11.11 HISTORY OF OPIOID ABUSE (HCC): ICD-10-CM

## 2023-01-18 DIAGNOSIS — N89.8 VAGINAL DISCHARGE: ICD-10-CM

## 2023-01-18 LAB
APPEARANCE UR: ABNORMAL
BACTERIA URNS QL MICRO: NEGATIVE /HPF
BILIRUB UR QL: NEGATIVE
COLOR UR: ABNORMAL
EPITH CASTS URNS QL MICRO: ABNORMAL /LPF
GLUCOSE BLD STRIP.AUTO-MCNC: 337 MG/DL (ref 65–100)
GLUCOSE UR STRIP.AUTO-MCNC: >1000 MG/DL
HCG UR QL: NEGATIVE
HGB UR QL STRIP: NEGATIVE
KETONES UR QL STRIP.AUTO: 15 MG/DL
LEUKOCYTE ESTERASE UR QL STRIP.AUTO: NEGATIVE
MUCOUS THREADS URNS QL MICRO: ABNORMAL /LPF
NITRITE UR QL STRIP.AUTO: NEGATIVE
PERFORMED BY, TECHID: ABNORMAL
PH UR STRIP: 6.5
PROT UR STRIP-MCNC: NEGATIVE MG/DL
RBC #/AREA URNS HPF: ABNORMAL /HPF (ref 0–5)
SP GR UR REFRACTOMETRY: 1.01 (ref 1–1.03)
UA: UC IF INDICATED,UAUC: ABNORMAL
UROBILINOGEN UR QL STRIP.AUTO: 1 EU/DL (ref 0.2–1)
WBC URNS QL MICRO: ABNORMAL /HPF (ref 0–4)

## 2023-01-18 PROCEDURE — 74011636637 HC RX REV CODE- 636/637: Performed by: NURSE PRACTITIONER

## 2023-01-18 PROCEDURE — 74011250637 HC RX REV CODE- 250/637: Performed by: NURSE PRACTITIONER

## 2023-01-18 PROCEDURE — 99284 EMERGENCY DEPT VISIT MOD MDM: CPT

## 2023-01-18 PROCEDURE — 81025 URINE PREGNANCY TEST: CPT

## 2023-01-18 PROCEDURE — 81001 URINALYSIS AUTO W/SCOPE: CPT

## 2023-01-18 PROCEDURE — 82962 GLUCOSE BLOOD TEST: CPT

## 2023-01-18 RX ORDER — FLUCONAZOLE 100 MG/1
200 TABLET ORAL
Status: COMPLETED | OUTPATIENT
Start: 2023-01-18 | End: 2023-01-18

## 2023-01-18 RX ORDER — INSULIN GLARGINE 100 [IU]/ML
20 INJECTION, SOLUTION SUBCUTANEOUS DAILY
Qty: 3 ML | Refills: 0 | Status: SHIPPED | OUTPATIENT
Start: 2023-01-18

## 2023-01-18 RX ORDER — LIDOCAINE HYDROCHLORIDE 40 MG/ML
SOLUTION TOPICAL
Status: DISCONTINUED | OUTPATIENT
Start: 2023-01-18 | End: 2023-01-18 | Stop reason: HOSPADM

## 2023-01-18 RX ORDER — INSULIN GLARGINE 100 [IU]/ML
20 INJECTION, SOLUTION SUBCUTANEOUS
Status: COMPLETED | OUTPATIENT
Start: 2023-01-18 | End: 2023-01-18

## 2023-01-18 RX ORDER — TRAMADOL HYDROCHLORIDE 50 MG/1
50 TABLET ORAL
Status: COMPLETED | OUTPATIENT
Start: 2023-01-18 | End: 2023-01-18

## 2023-01-18 RX ADMIN — TRAMADOL HYDROCHLORIDE 50 MG: 50 TABLET, COATED ORAL at 10:23

## 2023-01-18 RX ADMIN — FLUCONAZOLE 200 MG: 100 TABLET ORAL at 10:57

## 2023-01-18 RX ADMIN — INSULIN GLARGINE 20 UNITS: 100 INJECTION, SOLUTION SUBCUTANEOUS at 10:24

## 2023-01-18 NOTE — Clinical Note
[address] [facility] EMERGENCY DEPT  [address] [place] [id number]  [phone]    Work/School Note    Date: 1/18/2023    To Whom It May concern:    Bakari Robert was seen and treated today in the emergency room by the following provider(s):  Nurse Practitioner: More Bartlett NP. Bakari Robert is excused from work/school on 01/18/23 and 01/19/23. She is medically clear to return to work/school on 1/20/2023.        Sincerely,          Berny Rollins NP

## 2023-01-18 NOTE — ED PROVIDER NOTES
[facility] EMERGENCY DEPT  EMERGENCY DEPARTMENT HISTORY AND PHYSICAL EXAM      Date: 1/18/2023  Patient Name: Rich Owen  MRN: [id number]  [place]: 1995  Date of evaluation: 1/18/2023  Provider: Tonya Tracy NP   Note Started: 9:43 AM 1/18/23    HISTORY OF PRESENT ILLNESS     Chief Complaint   Patient presents with    Vaginal Itching       History Provided By: Patient    HPI: Rich Owen, 32 y.o. female past medical history of type 2 diabetes on insulin, hypertension, obesity and psychiatric disorder presents with onset 3 days ago of vaginal itching, white thick vaginal discharge consistent with previous yeast infections as well as her blood sugar higher than usual and has run out of her Lantus with last dose yesterday. She was seen in the ER on 1/13/2023 for abscess on her left inner thigh and started on Bactrim and shortly after starting the Bactrim prescription she began to have a yeast infection symptoms. States that her labia is throbbing with pain from the frequent scratching but denies any swelling to labia, open lesions or concern for STIs. States the left inner thigh abscess has improved greatly and is requesting pain medication for her labial discomfort, Diflucan for the yeast infection and a refill of her Lantus. She denies any nausea vomiting abdominal pain diarrhea. Has had hospitalizations for increased blood sugar previously but denies any symptoms similar to those episodes with last being over 1 year ago. Denies any urinary symptoms other than pain with urination when she voids due to the irritation of her labia. Denies any recurrence or symptoms similar to previous labial abscesses.     PAST MEDICAL HISTORY   Past Medical History:  Past Medical History:   Diagnosis Date    Anxiety     Depression     Diabetes ([address]     GERD (gastroesophageal reflux disease)     High cholesterol     Hypertension     Vaginal yeast infection     Vaginitis and vulvovaginitis        Past Surgical History:  Past Surgical History:   Procedure Laterality Date    HX  SECTION         Family History:  History reviewed. No pertinent family history. Social History:  Social History     Tobacco Use    Smoking status: Every Day     Packs/day: 0.50     Types: Cigarettes    Smokeless tobacco: Never   Substance Use Topics    Alcohol use: Not Currently    Drug use: Not Currently       Allergies:  No Known Allergies    PCP: Ryan Card MD    Current Meds:   Discharge Medication List as of 2023 10:41 AM        CONTINUE these medications which have NOT CHANGED    Details   trimethoprim-sulfamethoxazole (Bactrim DS) 160-800 mg per tablet Take 2 Tablets by mouth two (2) times a day., Normal, Disp-40 Tablet, R-0      naproxen (NAPROSYN) 500 mg tablet Take 1 Tablet by mouth every twelve (12) hours as needed for Pain., Normal, Disp-20 Tablet, R-0      omeprazole (PRILOSEC) 40 mg capsule Take 40 mg by mouth daily. , Historical Med      atorvastatin (LIPITOR) 20 mg tablet Take  by mouth daily. , Historical Med      metFORMIN (GLUCOPHAGE) 850 mg tablet Take 850 mg by mouth two (2) times daily (with meals). , Historical Med      venlafaxine (EFFEXOR) 75 mg tablet Take 25 mg by mouth three (3) times daily. , Historical Med      !! insulin glargine (Lantus Solostar U-100 Insulin) 100 unit/mL (3 mL) inpn 20 Units by SubCUTAneous route nightly., Historical Med      medroxyPROGESTERone (DEPO-PROVERA) 150 mg/mL syrg 150 mg by IntraMUSCular route once., Historical Med       !! - Potential duplicate medications found. Please discuss with provider. REVIEW OF SYSTEMS   Review of Systems   Constitutional: Negative. HENT: Negative. Eyes: Negative. Respiratory: Negative. Cardiovascular: Negative. Gastrointestinal: Negative. Endocrine: Negative. Genitourinary:  Positive for dysuria, vaginal discharge and vaginal pain.  Negative for decreased urine volume, flank pain, frequency, genital sores, menstrual problem, pelvic pain, urgency and vaginal bleeding. Musculoskeletal: Negative. Negative for back pain. Skin:  Positive for wound. Neurological: Negative. Hematological: Negative. Psychiatric/Behavioral: Negative. All other systems reviewed and are negative. Positives and Pertinent negatives as per HPI. PHYSICAL EXAM     ED Triage Vitals [01/18/23 0926]   ED Encounter Vitals Group      /72      Pulse (Heart Rate) 81      Resp Rate 18      Temp 98.2 °F (36.8 °C)      Temp src       O2 Sat (%) 100 %      Weight 230 lb      Height 5' 7\"      Physical Exam  Vitals and nursing note reviewed. Exam conducted with a chaperone present. Constitutional:       General: She is not in acute distress. Appearance: Normal appearance. She is obese. She is not ill-appearing, toxic-appearing or diaphoretic. HENT:      Head: Normocephalic. Nose: Nose normal.      Mouth/Throat:      Mouth: Mucous membranes are moist.      Pharynx: No posterior oropharyngeal erythema. Eyes:      General: No scleral icterus. Conjunctiva/sclera: Conjunctivae normal.   Cardiovascular:      Rate and Rhythm: Normal rate. Heart sounds: Normal heart sounds. Pulmonary:      Effort: Pulmonary effort is normal. No respiratory distress. Abdominal:      Palpations: Abdomen is soft. Tenderness: There is no abdominal tenderness. Genitourinary:     Exam position: Supine. Labia:         Right: Tenderness present. No lesion. Left: Tenderness present. No lesion. Vagina: Vaginal discharge and erythema present. No lesions. Musculoskeletal:         General: Normal range of motion. Cervical back: Neck supple. Skin:     General: Skin is warm and dry. Capillary Refill: Capillary refill takes less than 2 seconds. Comments: Erythema to labia without excoriation, open wounds, induration, fluctuation or evidence of abscess.   Healing abscess area to left inner/upper thigh without drainage, induration, open skin, fluctuation, surrounding erythema or streaking   Neurological:      General: No focal deficit present. Mental Status: She is alert and oriented to person, place, and time. Psychiatric:         Mood and Affect: Mood is anxious. SCREENINGS               No data recorded        LAB, EKG AND DIAGNOSTIC RESULTS   Labs:  Recent Results (from the past 12 hour(s))   GLUCOSE, POC    Collection Time: 01/18/23  9:29 AM   Result Value Ref Range    Glucose (POC) 337 (H) 65 - 100 mg/dL    Performed by Alberta Pierre    HCG URINE, QL    Collection Time: 01/18/23  9:30 AM   Result Value Ref Range    HCG urine, QL Negative Negative     URINALYSIS W/ REFLEX CULTURE    Collection Time: 01/18/23  9:40 AM    Specimen: Urine   Result Value Ref Range    Color Yellow/Straw      Appearance Hazy (A) Clear      Specific gravity 1.015 1.003 - 1.030      pH (UA) 6.5      Protein Negative Negative mg/dL    Glucose >1,000 (A) Negative mg/dL    Ketone 15 (A) Negative mg/dL    Bilirubin Negative Negative      Blood Negative Negative      Urobilinogen 1.0 0.2 - 1.0 EU/dL    Nitrites Negative Negative      Leukocyte Esterase Negative Negative      UA:UC IF INDICATED Culture not indicated by UA result Culture not indicated by UA result      WBC 0-4 0 - 4 /hpf    RBC 0-5 0 - 5 /hpf    Epithelial cells Many (A) Few /lpf    Bacteria Negative Negative /hpf    Mucus Trace /lpf       EKG: No indication for EKG    Radiologic Studies:  Non-plain film images such as CT, Ultrasound and MRI are read by the radiologist. Plain radiographic images are visualized and preliminarily interpreted by the ED Provider with the below findings:      Interpretation per the Radiologist below, if available at the time of this note:  No results found. PROCEDURES   Unless otherwise noted below, none.   Performed by: Priti Brock NP   Procedures      CRITICAL CARE TIME   N/A - no critical care time or criteria met    ED COURSE and DIFFERENTIAL DIAGNOSIS/MDM   Vitals:    Vitals:    01/18/23 0926   BP: 131/72   Pulse: 81   Resp: 18   Temp: 98.2 °F (36.8 °C)   SpO2: 100%   Weight: 104.3 kg (230 lb)   Height: 5' 7\" (1.702 m)        Patient was given the following medications:  Medications   lidocaine (XYLOCAINE) 4 % (40 mg/mL) topical solution ( Topical Missed 1/18/23 0944)   insulin glargine (LANTUS) injection 20 Units (20 Units SubCUTAneous Given 1/18/23 1024)   traMADoL (ULTRAM) tablet 50 mg (50 mg Oral Given 1/18/23 1023)   fluconazole (DIFLUCAN) tablet 200 mg (200 mg Oral Given 1/18/23 1057)       CONSULTS: (Who and What was discussed)  None     Chronic Conditions: 2 diabetes, obesity, hypertension, anxiety, depression  Social Determinants affecting Dx or Tx: None  Counseling: TOBACCO COUNSELING: Upon evaluation, pt expressed that they are a current tobacco user. For approximately 10 minutes, pt has been counseled on the dangers of smoking and was encouraged to quit as soon as possible in order to decrease further risks to their health. Pt has conveyed their understanding of the risks involved should they continue to use tobacco products. Records Reviewed (source and summary of external notes): Nursing Notes, Old Medical Records, Previous Radiology Studies, Previous Laboratory Studies, and PDMP    CC/HPI Summary, DDx, ED Course, and Reassessment:   Patient is a 32 y.o. female presenting for vaginal discharge. Vitals reveal no significant abnormalities and physical exam reveals  vaginal irritation with white thick discharge and otherwise  . Based on the history, physical exam, risk factors, and vitals signs, differential diagnoses include: bacterial vaginitis, candidal vaginitis, cervicitis, gonorrhea, trichomonas, chlamydia, pregnancy, UTI. Doubt PID/TOA to reassuring vital signs and normal physical exam without toxic appearance or physical exam abnormalities.     See ED Course and Reassessment for evaluation and discussion. ED Course as of 01/18/23 1106   Wed Jan 18, 2023   1015 Urinalysis with glucose greater than 1000, negative protein, ketones 15, negative blood, negative nitrites leukocyte esterase and bacteria without evidence of acute UTI. Awaiting urine pregnancy prior to administration of Diflucan. Discussed with patient to again declines any further work-up or labs for her blood sugar and is familiar with hyperglycemia and consequences thereof including DKA and continues to decline any further work-up and only requests Diflucan and pain medication for her vaginal irritation with refill of her Lantus. [KR]   1017 HCG URINE, QL:    HCG urine, QL Negative [KR]      ED Course User Index  [KR] Liam Nguyen NP       Disposition Considerations (Tests not done, Shared Decision Making, Pt Expectation of Test or Treatment.):   Consideration given to metabolic or electrolyte abnormalities or disturbance given patient's history of diabetes and noncompliance with insulin. Discussed several times extensively with patient who declined any labs stating she was aware of the symptoms of diabetic emergencies and is not experiencing any and understood consequences of lack of further evaluation. Given lack of symptoms or physical exam findings of concern other than vaginal symptoms further lab work was not performed due to patient's refusal.  Consideration given to STI and discussed with patient. Patient denied any concern due to lack of intercourse and has history of STIs and is familiar with symptoms and states that her symptoms are consistent with previous vaginal yeast infections and declined any STI testing or prophylactic treatment. She plans to follow-up with her GYN and made appointment in the emergency department.   Consideration also given to vaginal abscesses given patient's history and there was no evidence on physical examination of any abscess and there was evidence of healing to the left inner upper thigh abscess that was previously treated. Patient requested to be treated with the Diflucan in the emergency department with reporting of previous success with that treatment. She was requesting narcotic pain medication and was discussed with patient lack of effectiveness or indication for need for narcotic pain medication for her vaginal irritation and topical lidocaine was offered and accepted by patient and prescribed for her as well. She has appointment with GYN and will be seeing her PCP for any adjustments in her insulin and is aware of strict return precautions for any diabetic or glucose emergencies or any other concerning symptoms and voiced agreement and comfort with discharge plan and treatment plan. FINAL IMPRESSION     1. Vaginal itching    2. Vaginal discharge    3. Uncontrolled type 2 diabetes mellitus with hyperglycemia ([address]    4. History of opioid abuse [facility])          DISPOSITION/PLAN   Discharged    Discharge Note: The patient is stable for discharge home. The signs, symptoms, diagnosis, and discharge instructions have been discussed, understanding conveyed, and agreed upon. The patient is to follow up as recommended or return to ER should their symptoms worsen.       PATIENT REFERRED TO:  Follow-up Information       Follow up With Specialties Details Why Contact Info    Marcia Corona MD Family Medicine In 1 day For any further pain medication [address] [facility] [phone]      Lamonte Esquivel Gynecology  Please keep appointment as scheduled [address] [place] [address]  [phone]      Follow up with primary care, urgent care, or this Emergency department   As needed, If symptoms worsen               DISCHARGE MEDICATIONS:  Discharge Medication List as of 1/18/2023 10:41 AM        START taking these medications    Details   !! insulin glargine (Lantus Solostar U-100 Insulin) 100 unit/mL (3 mL) inpn 20 Units by SubCUTAneous route daily. , Normal, Disp-3 mL, R-0      lidocaine 4 % gel Apply twice daily as needed to labia (outer vaginal area) for irritation and pain. Do not apply with the clotrimazole at the same time. Indications: skin irritation, Normal, Disp-1 Each, R-0       !! - Potential duplicate medications found. Please discuss with provider. CONTINUE these medications which have NOT CHANGED    Details   trimethoprim-sulfamethoxazole (Bactrim DS) 160-800 mg per tablet Take 2 Tablets by mouth two (2) times a day., Normal, Disp-40 Tablet, R-0      naproxen (NAPROSYN) 500 mg tablet Take 1 Tablet by mouth every twelve (12) hours as needed for Pain., Normal, Disp-20 Tablet, R-0      omeprazole (PRILOSEC) 40 mg capsule Take 40 mg by mouth daily. , Historical Med      atorvastatin (LIPITOR) 20 mg tablet Take  by mouth daily. , Historical Med      metFORMIN (GLUCOPHAGE) 850 mg tablet Take 850 mg by mouth two (2) times daily (with meals). , Historical Med      venlafaxine (EFFEXOR) 75 mg tablet Take 25 mg by mouth three (3) times daily. , Historical Med      !! insulin glargine (Lantus Solostar U-100 Insulin) 100 unit/mL (3 mL) inpn 20 Units by SubCUTAneous route nightly., Historical Med      medroxyPROGESTERone (DEPO-PROVERA) 150 mg/mL syrg 150 mg by IntraMUSCular route once., Historical Med       !! - Potential duplicate medications found. Please discuss with provider. DISCONTINUED MEDICATIONS:  Discharge Medication List as of 1/18/2023 10:41 AM          I am the Primary Clinician of Record: Cinda Rockwell NP (electronically signed)    (Please note that parts of this dictation were completed with voice recognition software. Quite often unanticipated grammatical, syntax, homophones, and other interpretive errors are inadvertently transcribed by the computer software. Please disregards these errors.  Please excuse any errors that have escaped final proofreading.)

## 2023-01-18 NOTE — Clinical Note
[facility] EMERGENCY DEPT  [address] [place] [id number]  [phone]    Work/School Note    Date: 1/18/2023    To Whom It May concern:    Nichole Rock was seen and treated today in the emergency room by the following provider(s):  Nurse Practitioner: Jessica Thompson NP. Nichole Rock is excused from work/school on 01/18/23 and 01/19/23. She is medically clear to return to work/school on 1/20/2023.        Sincerely,          Sanjay Quijano

## 2023-01-18 NOTE — ED TRIAGE NOTES
Patient states that she is diabetic and thinks her sugar is high she has a bad yeast infection that's really bothering her

## 2023-02-08 ENCOUNTER — HOSPITAL ENCOUNTER (EMERGENCY)
Age: 28
Discharge: HOME | End: 2023-02-08
Attending: EMERGENCY MEDICINE
Payer: MEDICAID

## 2023-02-08 VITALS
SYSTOLIC BLOOD PRESSURE: 129 MMHG | DIASTOLIC BLOOD PRESSURE: 79 MMHG | BODY MASS INDEX: 36.1 KG/M2 | HEART RATE: 72 BPM | RESPIRATION RATE: 16 BRPM | WEIGHT: 230 LBS | OXYGEN SATURATION: 99 % | HEIGHT: 67 IN | TEMPERATURE: 98.2 F

## 2023-02-08 DIAGNOSIS — L02.91 ABSCESS: Primary | ICD-10-CM

## 2023-02-08 LAB
GLUCOSE BLD STRIP.AUTO-MCNC: 331 MG/DL (ref 65–100)
PERFORMED BY, TECHID: ABNORMAL

## 2023-02-08 PROCEDURE — 82962 GLUCOSE BLOOD TEST: CPT

## 2023-02-08 PROCEDURE — 99283 EMERGENCY DEPT VISIT LOW MDM: CPT

## 2023-02-08 RX ORDER — ACETAMINOPHEN 325 MG/1
975 TABLET ORAL
Status: DISCONTINUED | OUTPATIENT
Start: 2023-02-08 | End: 2023-02-08 | Stop reason: HOSPADM

## 2023-02-08 RX ORDER — IBUPROFEN 400 MG/1
400 TABLET ORAL
Status: DISCONTINUED | OUTPATIENT
Start: 2023-02-08 | End: 2023-02-08 | Stop reason: HOSPADM

## 2023-02-08 RX ORDER — CLINDAMYCIN HYDROCHLORIDE 300 MG/1
300 CAPSULE ORAL 4 TIMES DAILY
Qty: 28 CAPSULE | Refills: 0 | Status: SHIPPED | OUTPATIENT
Start: 2023-02-08 | End: 2023-02-15

## 2023-02-08 RX ORDER — FLUCONAZOLE 150 MG/1
150 TABLET ORAL
Qty: 1 TABLET | Refills: 0 | Status: SHIPPED | OUTPATIENT
Start: 2023-02-08 | End: 2023-02-08

## 2023-02-08 NOTE — DISCHARGE INSTRUCTIONS
Thank you! Thank you for allowing me to care for you in the emergency department. It is my goal to provide you with excellent care. If you have not received excellent quality care, please ask to speak to the nurse manager. Please fill out the survey that will come to you by mail or email since we listen to your feedback! Below you will find a list of your tests from today's visit. Should you have any questions, please do not hesitate to call the emergency department. Labs  Recent Results (from the past 12 hour(s))   GLUCOSE, POC    Collection Time: 02/08/23  3:09 PM   Result Value Ref Range    Glucose (POC) 331 (H) 65 - 100 mg/dL    Performed by Courtney VAN        Radiologic Studies  No orders to display     CT Results  (Last 48 hours)      None          CXR Results  (Last 48 hours)      None          ------------------------------------------------------------------------------------------------------------  The exam and treatment you received in the Emergency Department were for an urgent problem and are not intended as complete care. It is important that you follow-up with a doctor, nurse practitioner, or physician assistant to:  (1) confirm your diagnosis,  (2) re-evaluation of changes in your illness and treatment, and  (3) for ongoing care. Please take your discharge instructions with you when you go to your follow-up appointment. If you have any problem arranging a follow-up appointment, contact the Emergency Department. If your symptoms become worse or you do not improve as expected and you are unable to reach your health care provider, please return to the Emergency Department. We are available 24 hours a day. If a prescription has been provided, please have it filled as soon as possible to prevent a delay in treatment.  If you have any questions or reservations about taking the medication due to side effects or interactions with other medications, please call your primary care provider or contact the ER.

## 2023-02-08 NOTE — ED NOTES
This nurse offered patient motrin and tylenol that was ordered, patient refusing to take motrin and tylenol because she states \"she just took that an hour before she came here\". Pt very agitated that we are only offering motrin and tylenol. Patient is requesting percocet or tramadol. Spoke with provider, states patient will not be receiving anything else. Tani Landa spoke with patient regarding care, patient verbalized understanding. Requested a diflucan, which was sent to pharmacy by Radha Parker NP. Patient discharged with no further concerns.

## 2023-02-08 NOTE — ED PROVIDER NOTES
[facility] EMERGENCY DEPT  EMERGENCY DEPARTMENT HISTORY AND PHYSICAL EXAM      Date: 2023  Patient Name: Lila Unger  MRN: [id number]  [place]: 1995  Date of evaluation: 2023  Provider: Lotus Gonzalez NP   Note Started: 1:45 PM 23    HISTORY OF PRESENT ILLNESS     Chief Complaint   Patient presents with    Abscess       History Provided By: Patient    HPI: Lila Unger, 32 y.o. female with a history of anxiety, depression, DM, GERD, HTN, and abscess presents with abscess to the vagina. Patient reports onset 2 days ago with worsening. Recurrent abscesses in this area. She denies fever, nausea, vomiting, diarrhea, back pain, hematuria, dysuria, headache, dizziness, chest pain, pelvic pain, or difficulty breathing. She describes the pain as \"burning\" and nonradiating. She has been seen in this ED multiple times in the past for the same complaint. No follow-up with OB/GYN. PAST MEDICAL HISTORY   Past Medical History:  Past Medical History:   Diagnosis Date    Anxiety     Depression     Diabetes ([address]     GERD (gastroesophageal reflux disease)     High cholesterol     Hypertension     Vaginal yeast infection     Vaginitis and vulvovaginitis        Past Surgical History:  Past Surgical History:   Procedure Laterality Date    HX  SECTION         Family History:  History reviewed. No pertinent family history. Social History:  Social History     Tobacco Use    Smoking status: Every Day     Packs/day: 0.50     Types: Cigarettes    Smokeless tobacco: Never   Substance Use Topics    Alcohol use: Not Currently    Drug use: Not Currently       Allergies:  No Known Allergies    PCP: Dinorah Barreto MD    Current Meds:   Discharge Medication List as of 2023  2:44 PM        CONTINUE these medications which have NOT CHANGED    Details   !! insulin glargine (Lantus Solostar U-100 Insulin) 100 unit/mL (3 mL) inpn 20 Units by SubCUTAneous route daily. , Normal, Disp-3 mL, R-0      lidocaine 4 % gel Apply twice daily as needed to labia (outer vaginal area) for irritation and pain. Do not apply with the clotrimazole at the same time. Indications: skin irritation, Normal, Disp-1 Each, R-0      trimethoprim-sulfamethoxazole (Bactrim DS) 160-800 mg per tablet Take 2 Tablets by mouth two (2) times a day., Normal, Disp-40 Tablet, R-0      naproxen (NAPROSYN) 500 mg tablet Take 1 Tablet by mouth every twelve (12) hours as needed for Pain., Normal, Disp-20 Tablet, R-0      omeprazole (PRILOSEC) 40 mg capsule Take 40 mg by mouth daily. , Historical Med      atorvastatin (LIPITOR) 20 mg tablet Take  by mouth daily. , Historical Med      metFORMIN (GLUCOPHAGE) 850 mg tablet Take 850 mg by mouth two (2) times daily (with meals). , Historical Med      venlafaxine (EFFEXOR) 75 mg tablet Take 25 mg by mouth three (3) times daily. , Historical Med      !! insulin glargine (Lantus Solostar U-100 Insulin) 100 unit/mL (3 mL) inpn 20 Units by SubCUTAneous route nightly., Historical Med      medroxyPROGESTERone (DEPO-PROVERA) 150 mg/mL syrg 150 mg by IntraMUSCular route once., Historical Med       !! - Potential duplicate medications found. Please discuss with provider. REVIEW OF SYSTEMS   Review of Systems   Constitutional:  Negative for appetite change, chills and fever. Respiratory:  Negative for shortness of breath. Cardiovascular:  Negative for chest pain and leg swelling. Gastrointestinal:  Negative for abdominal pain, diarrhea, nausea and vomiting. Genitourinary:  Positive for vaginal pain. Negative for decreased urine volume, dysuria, flank pain, frequency, genital sores, hematuria, pelvic pain, urgency, vaginal bleeding and vaginal discharge. Musculoskeletal:  Negative for back pain and myalgias. Skin:  Negative for rash. Neurological:  Negative for dizziness, weakness, numbness and headaches. Positives and Pertinent negatives as per HPI.     PHYSICAL EXAM     ED Triage Vitals   ED Encounter Vitals Group      BP       Pulse       Resp       Temp       Temp src       SpO2       Weight       Height       Physical Exam  Vitals and nursing note reviewed. Exam conducted with a chaperone present [facility]). Constitutional:       General: She is not in acute distress. Appearance: She is obese. She is not ill-appearing. HENT:      Head: Normocephalic. Nose: Nose normal. No congestion or rhinorrhea. Mouth/Throat:      Mouth: Mucous membranes are moist.      Pharynx: Oropharynx is clear. Eyes:      Extraocular Movements: Extraocular movements intact. Conjunctiva/sclera: Conjunctivae normal.      Pupils: Pupils are equal, round, and reactive to light. Cardiovascular:      Rate and Rhythm: Normal rate and regular rhythm. Pulses: Normal pulses. Heart sounds: Normal heart sounds. No murmur heard. Pulmonary:      Effort: Pulmonary effort is normal. No respiratory distress. Breath sounds: Normal breath sounds. Abdominal:      General: Bowel sounds are normal. There is no distension. Palpations: Abdomen is soft. There is no mass. Tenderness: There is no abdominal tenderness. There is no right CVA tenderness, left CVA tenderness, guarding or rebound. Hernia: No hernia is present. There is no hernia in the left inguinal area or right inguinal area. Genitourinary:     Pubic Area: No rash. Musculoskeletal:         General: Normal range of motion. Cervical back: Normal range of motion and neck supple. No tenderness. Lymphadenopathy:      Cervical: No cervical adenopathy. Lower Body: No right inguinal adenopathy. No left inguinal adenopathy. Skin:     General: Skin is warm and dry. Findings: No bruising, erythema or rash. Neurological:      General: No focal deficit present. Mental Status: She is alert and oriented to person, place, and time. Sensory: No sensory deficit. Motor: No weakness.    Psychiatric:         Mood and Affect: Mood normal.     SCREENINGS               No data recorded      LAB, EKG AND DIAGNOSTIC RESULTS   Labs:  Recent Results (from the past 12 hour(s))   GLUCOSE, POC    Collection Time: 02/08/23  3:09 PM   Result Value Ref Range    Glucose (POC) 331 (H) 65 - 100 mg/dL    Performed by Sandy VAN        Radiologic Studies:  Non-plain film images such as CT, Ultrasound and MRI are read by the radiologist. Plain radiographic images are visualized and preliminarily interpreted by the ED Provider with the below findings:    Interpretation per the Radiologist below, if available at the time of this note:  No results found. PROCEDURES   Unless otherwise noted below, none. Performed by: Davi Cat NP   Procedures      CRITICAL CARE TIME       ED COURSE and DIFFERENTIAL DIAGNOSIS/MDM   Vitals:    Vitals:    02/08/23 1347   BP: 129/79   Pulse: 72   Resp: 16   Temp: 98.2 °F (36.8 °C)   SpO2: 99%   Weight: 104.3 kg (230 lb)   Height: 5' 7\" (1.702 m)        Patient was given the following medications:  Medications   acetaminophen (TYLENOL) tablet 975 mg (975 mg Oral Refused 2/8/23 1430)   ibuprofen (MOTRIN) tablet 400 mg (400 mg Oral Refused 2/8/23 1430)       CONSULTS: (Who and What was discussed)  None     Chronic Conditions: DM  Social Determinants affecting Dx or Tx:   Counseling:      Records Reviewed (source and summary of external notes): Prior medical records, Previous Laboratory studies, and Nursing notes    CC/HPI Summary, DDx, ED Course, and Reassessment:     ED Course as of 02/08/23 1528   Wed Feb 08, 2023   1410 PDMP reviewed. [QD]   3097 25-year-old female presents with abscess to vagina. She is well-appearing, moving about in stretcher without difficulty. Vital signs are appropriate. Differentials include hidradenitis, abscess, yeast infection, lymphadenitis, STI, Brian gangrene, folliculitis, necrotizing cellulitis.    [KW]   1420 Palpable abscess with no surrounding or overlying erythema, cellulitis, skin streaking, ecchymosis, or drainage. No systemic evidence of infection. Patient denies any other complaints. [KW]   2191 Patient declines I&D. [KW]   3195 Patient is refusing acetaminophen and ibuprofen. Requesting stronger pain medication. Patient discussed with Dr. Ajay Yen. [XA]   8815 Patient is now complaining of vaginal itching and requesting Diflucan from RN. Will provide prescription. Encourage compliance with diabetes treatment and follow-up with OB/GYN/surgery for abscess management. [KW]   Hayley Servin 187(!): 331 [KW]   1520 Patient declines assessment and intervention of elevated blood sugar. She reports not checking her blood sugars at home and noncompliance with diabetes management. [KW]   9266 Patient spoke with nursing supervisor multiple times regarding request for narcotic pain medication. She continues to decline medication other than Percocet. [KW]   2030 Patient was discharged with antibiotic and Diflucan prescription. Warning signs and return precautions discussed throughout assessment. OB/GYN and surgery referrals provided. Patient is not receptive to education and continues to decline work-ups or interventions. [KW]      ED Course User Index  [KW] Acosta Hartman NP       Disposition Considerations (Tests not done, Shared Decision Making, Pt Expectation of Test or Treatment.):  Patient was offered laboratory work-up to assess for DKA or other cause of recurrent abscess. She declined. She specifically only request pain medication and antibiotics. She declines I&D as well. This discussion was recurrent and refusal was recurrent. Risks and benefits discussed. FINAL IMPRESSION     1. Abscess          DISPOSITION/PLAN   Discharged    Discharge Note: The patient is stable for discharge home. The signs, symptoms, diagnosis, and discharge instructions have been discussed, understanding conveyed, and agreed upon.  The patient is to follow up as recommended or return to ER should their symptoms worsen. PATIENT REFERRED TO:  Follow-up Information       Follow up With Specialties Details Why Contact Info    Michoacano Rosenthal MD General Surgery Call today  [address]  [address] [facility]  [phone]      Ennis Counter, [address] Gynecology Call today  [address] [facility]  [phone]                DISCHARGE MEDICATIONS:  Discharge Medication List as of 2/8/2023  2:44 PM        START taking these medications    Details   clindamycin (CLEOCIN) 300 mg capsule Take 1 Capsule by mouth four (4) times daily for 7 days. , Normal, Disp-28 Capsule, R-0           CONTINUE these medications which have NOT CHANGED    Details   !! insulin glargine (Lantus Solostar U-100 Insulin) 100 unit/mL (3 mL) inpn 20 Units by SubCUTAneous route daily. , Normal, Disp-3 mL, R-0      lidocaine 4 % gel Apply twice daily as needed to labia (outer vaginal area) for irritation and pain. Do not apply with the clotrimazole at the same time. Indications: skin irritation, Normal, Disp-1 Each, R-0      trimethoprim-sulfamethoxazole (Bactrim DS) 160-800 mg per tablet Take 2 Tablets by mouth two (2) times a day., Normal, Disp-40 Tablet, R-0      naproxen (NAPROSYN) 500 mg tablet Take 1 Tablet by mouth every twelve (12) hours as needed for Pain., Normal, Disp-20 Tablet, R-0      omeprazole (PRILOSEC) 40 mg capsule Take 40 mg by mouth daily. , Historical Med      atorvastatin (LIPITOR) 20 mg tablet Take  by mouth daily. , Historical Med      metFORMIN (GLUCOPHAGE) 850 mg tablet Take 850 mg by mouth two (2) times daily (with meals). , Historical Med      venlafaxine (EFFEXOR) 75 mg tablet Take 25 mg by mouth three (3) times daily. , Historical Med      !! insulin glargine (Lantus Solostar U-100 Insulin) 100 unit/mL (3 mL) inpn 20 Units by SubCUTAneous route nightly., Historical Med      medroxyPROGESTERone (DEPO-PROVERA) 150 mg/mL syrg 150 mg by IntraMUSCular route once., Historical Med       !! - Potential duplicate medications found. Please discuss with provider. DISCONTINUED MEDICATIONS:  Discharge Medication List as of 2/8/2023  2:44 PM          I am the Primary Clinician of Record: Angie Benavides NP (electronically signed)    (Please note that parts of this dictation were completed with voice recognition software. Quite often unanticipated grammatical, syntax, homophones, and other interpretive errors are inadvertently transcribed by the computer software. Please disregards these errors.  Please excuse any errors that have escaped final proofreading.)

## 2023-02-09 ENCOUNTER — HOSPITAL ENCOUNTER (EMERGENCY)
Age: 28
Discharge: HOME | End: 2023-02-09
Attending: EMERGENCY MEDICINE
Payer: MEDICAID

## 2023-02-09 VITALS
DIASTOLIC BLOOD PRESSURE: 76 MMHG | RESPIRATION RATE: 16 BRPM | WEIGHT: 230 LBS | HEART RATE: 92 BPM | OXYGEN SATURATION: 100 % | HEIGHT: 67 IN | TEMPERATURE: 99.3 F | SYSTOLIC BLOOD PRESSURE: 120 MMHG | BODY MASS INDEX: 36.1 KG/M2

## 2023-02-09 DIAGNOSIS — L73.9 FOLLICULITIS: Primary | ICD-10-CM

## 2023-02-09 PROCEDURE — 99283 EMERGENCY DEPT VISIT LOW MDM: CPT

## 2023-02-09 PROCEDURE — 74011250637 HC RX REV CODE- 250/637: Performed by: EMERGENCY MEDICINE

## 2023-02-09 RX ORDER — IBUPROFEN 800 MG/1
800 TABLET ORAL ONCE
Status: COMPLETED | OUTPATIENT
Start: 2023-02-09 | End: 2023-02-09

## 2023-02-09 RX ORDER — ACETAMINOPHEN 500 MG
1000 TABLET ORAL ONCE
Status: COMPLETED | OUTPATIENT
Start: 2023-02-09 | End: 2023-02-09

## 2023-02-09 RX ORDER — HYDROCODONE BITARTRATE AND ACETAMINOPHEN 5; 325 MG/1; MG/1
1 TABLET ORAL
Qty: 6 TABLET | Refills: 0 | Status: SHIPPED | OUTPATIENT
Start: 2023-02-09 | End: 2023-02-12

## 2023-02-09 RX ORDER — IBUPROFEN 800 MG/1
800 TABLET ORAL
Qty: 10 TABLET | Refills: 0 | Status: SHIPPED | OUTPATIENT
Start: 2023-02-09 | End: 2023-02-12

## 2023-02-09 RX ORDER — HYDROCODONE BITARTRATE AND ACETAMINOPHEN 5; 325 MG/1; MG/1
1 TABLET ORAL ONCE
Status: COMPLETED | OUTPATIENT
Start: 2023-02-09 | End: 2023-02-09

## 2023-02-09 RX ADMIN — IBUPROFEN 800 MG: 800 TABLET, FILM COATED ORAL at 22:54

## 2023-02-09 RX ADMIN — HYDROCODONE BITARTRATE AND ACETAMINOPHEN 1 TABLET: 5; 325 TABLET ORAL at 22:54

## 2023-02-09 RX ADMIN — ACETAMINOPHEN 1000 MG: 500 TABLET ORAL at 22:55

## 2023-02-09 NOTE — Clinical Note
[address]  [address] [place] [id number]  [phone]    Work/School Note    Date: 2/9/2023    To Whom It May concern:    Souleymane Shah was seen and treated today in the emergency room by the following provider(s):  Attending Provider: Dorinda Mcdaniels MD.      Souleymane Shah is excused from work/school on 2/9/2023 through 2/11/2023. She is medically clear to return to work/school on 2/12/2023.          Sincerely,          Mica Romero MD

## 2023-02-10 NOTE — ED TRIAGE NOTES
Seen at [facility] for abscess vagina and butt.  Has started the abx and taking tylenol for pain and states it has gotten worse and stings

## 2023-02-10 NOTE — ED PROVIDER NOTES
EMERGENCY DEPARTMENT HISTORY AND PHYSICAL EXAM      Date: 2023  Patient Name: Joann Donaldson    History of Presenting Illness     Chief Complaint   Patient presents with    Abscess       History Provided By: Patient    HPI: Joann Donaldson, 32 y.o. female   presents to the ED with cc of \"boil\". Patient complains of a painful boil on her vagina for last several days. Patient was seen at [facility] ER yesterday and discharged with a course of clindamycin. ER record was reviewed by me. Patient complains of severe itching at the vaginal area and has been scratching a lot today which has exacerbated the \"stinging\" pain. No dysuria hematuria. No nausea or vomiting. No abdominal pain. No vaginal bleeding. No fever or chills. Patient has been compliant with the antibiotic that was prescribed yesterday. Patient also states that she has been compliant with the insulin regimen and her glucose has been running in 300s recently. No symptoms concerning suggestive of DKA as per patient. PCP: Lalo Gonzalez MD    No current facility-administered medications on file prior to encounter. Current Outpatient Medications on File Prior to Encounter   Medication Sig Dispense Refill    clindamycin (CLEOCIN) 300 mg capsule Take 1 Capsule by mouth four (4) times daily for 7 days. 28 Capsule 0    [] fluconazole (Diflucan) 150 mg tablet Take 1 Tablet by mouth now for 1 dose. FDA advises cautious prescribing of oral fluconazole in pregnancy. 1 Tablet 0    insulin glargine (Lantus Solostar U-100 Insulin) 100 unit/mL (3 mL) inpn 20 Units by SubCUTAneous route daily. 3 mL 0    lidocaine 4 % gel Apply twice daily as needed to labia (outer vaginal area) for irritation and pain. Do not apply with the clotrimazole at the same time. Indications: skin irritation 1 Each 0    trimethoprim-sulfamethoxazole (Bactrim DS) 160-800 mg per tablet Take 2 Tablets by mouth two (2) times a day.  40 Tablet 0    naproxen (NAPROSYN) 500 mg tablet Take 1 Tablet by mouth every twelve (12) hours as needed for Pain. 20 Tablet 0    omeprazole (PRILOSEC) 40 mg capsule Take 40 mg by mouth daily. atorvastatin (LIPITOR) 20 mg tablet Take  by mouth daily. metFORMIN (GLUCOPHAGE) 850 mg tablet Take 850 mg by mouth two (2) times daily (with meals). venlafaxine (EFFEXOR) 75 mg tablet Take 25 mg by mouth three (3) times daily. insulin glargine (Lantus Solostar U-100 Insulin) 100 unit/mL (3 mL) inpn 20 Units by SubCUTAneous route nightly. medroxyPROGESTERone (DEPO-PROVERA) 150 mg/mL syrg 150 mg by IntraMUSCular route once. Past History     Past Medical History:  Past Medical History:   Diagnosis Date    Anxiety     Depression     Diabetes ([address]     GERD (gastroesophageal reflux disease)     High cholesterol     Hypertension     Vaginal yeast infection     Vaginitis and vulvovaginitis        Past Surgical History:  Past Surgical History:   Procedure Laterality Date    HX  SECTION         Family History:  History reviewed. No pertinent family history. Social History:  Social History     Tobacco Use    Smoking status: Every Day     Packs/day: 0.50     Types: Cigarettes    Smokeless tobacco: Never   Substance Use Topics    Alcohol use: Not Currently    Drug use: Not Currently       Allergies:  No Known Allergies      Review of Systems   Review of Systems   Constitutional:  Negative for chills and fever. HENT:  Negative for sore throat. Respiratory:  Negative for shortness of breath. Cardiovascular:  Negative for chest pain. Gastrointestinal:  Negative for abdominal pain. Genitourinary:  Negative for vaginal bleeding. Neurological:  Negative for headaches. All other systems reviewed and are negative. Physical Exam   Physical Exam  Vitals and nursing note reviewed. Exam conducted with a chaperone present. Constitutional:       General: She is not in acute distress. Appearance: Normal appearance.  She is well-developed. She is not ill-appearing or toxic-appearing. HENT:      Head: Normocephalic and atraumatic. Nose: No congestion. Mouth/Throat:      Mouth: Mucous membranes are moist.   Eyes:      Conjunctiva/sclera: Conjunctivae normal.   Cardiovascular:      Rate and Rhythm: Regular rhythm. Heart sounds: Normal heart sounds. Pulmonary:      Effort: Pulmonary effort is normal.      Breath sounds: Normal breath sounds. Abdominal:      General: Bowel sounds are normal.      Palpations: Abdomen is soft. Tenderness: There is no abdominal tenderness. There is no guarding or rebound. Genitourinary:     General: Normal vulva. Rectum: Normal.      Comments: There is no appreciable or palpable fluctuance. No visible mass. Right side of vaginal and inguinal area with excoriated skin with some scratch marks. Musculoskeletal:         General: No deformity. Cervical back: Neck supple. Right lower leg: No edema. Left lower leg: No edema. Skin:     General: Skin is warm and dry. Neurological:      General: No focal deficit present. Mental Status: She is alert. Psychiatric:         Mood and Affect: Mood normal.       Diagnostic Study Results     Labs -   No results found for this or any previous visit (from the past 12 hour(s)). Radiologic Studies -   No orders to display     CT Results  (Last 48 hours)      None          CXR Results  (Last 48 hours)      None              Medical Decision Making   I am the first provider for this patient. I reviewed the vital signs, available nursing notes, past medical history, past surgical history, family history and social history. Vital Signs-Reviewed the patient's vital signs.   Patient Vitals for the past 12 hrs:   Temp Pulse Resp BP SpO2   02/09/23 2235 99.3 °F (37.4 °C) 92 16 120/76 100 %       Records Reviewed:     Provider Notes (Medical Decision Making):   Patient present with vaginal excoriation of skin due to severe itching and \"boil\". Clinically there is no fluctuance that need to be incised and drained. Patient was discharged with instruction to be compliant with antibiotic and frequent warm compresses and sitz bath's. Patient was advised to return to the emergency room for worsening pain or swelling of the area. ED Course:   Initial assessment performed. The patients presenting problems have been discussed, and they are in agreement with the care plan formulated and outlined with them. I have encouraged them to ask questions as they arise throughout their visit. Ambulated well      PROCEDURES      Disposition: Condition stable   DC- Adult Discharges: All of the diagnostic tests were reviewed and questions answered. Diagnosis, care plan and treatment options were discussed. understand instructions and will follow up as directed. The patients results have been reviewed with them. They have been counseled regarding their diagnosis. The patient verbally convey understanding and agreement of the signs, symptoms, diagnosis, treatment and prognosis and additionally agrees to follow up as recommended. They also agree with the care-plan and convey that all of their questions have been answered. I have also put together some discharge instructions for them that include: 1) educational information regarding their diagnosis, 2) how to care for their diagnosis at home, as well a 3) list of reasons why they would want to return to the ED prior to their follow-up appointment, should their condition change. PLAN:  1. Discharge Medication List as of 2/9/2023 10:52 PM        START taking these medications    Details   ibuprofen (MOTRIN) 800 mg tablet Take 1 Tablet by mouth every eight (8) hours as needed for Pain for up to 3 days. , Normal, Disp-10 Tablet, R-0      HYDROcodone-acetaminophen (Norco) 5-325 mg per tablet Take 1 Tablet by mouth every six (6) hours as needed for Pain for up to 3 days.  Max Daily Amount: 4 Tablets., Normal, Disp-6 Tablet, R-0           CONTINUE these medications which have NOT CHANGED    Details   clindamycin (CLEOCIN) 300 mg capsule Take 1 Capsule by mouth four (4) times daily for 7 days. , Normal, Disp-28 Capsule, R-0      !! insulin glargine (Lantus Solostar U-100 Insulin) 100 unit/mL (3 mL) inpn 20 Units by SubCUTAneous route daily. , Normal, Disp-3 mL, R-0      lidocaine 4 % gel Apply twice daily as needed to labia (outer vaginal area) for irritation and pain. Do not apply with the clotrimazole at the same time. Indications: skin irritation, Normal, Disp-1 Each, R-0      trimethoprim-sulfamethoxazole (Bactrim DS) 160-800 mg per tablet Take 2 Tablets by mouth two (2) times a day., Normal, Disp-40 Tablet, R-0      naproxen (NAPROSYN) 500 mg tablet Take 1 Tablet by mouth every twelve (12) hours as needed for Pain., Normal, Disp-20 Tablet, R-0      omeprazole (PRILOSEC) 40 mg capsule Take 40 mg by mouth daily. , Historical Med      atorvastatin (LIPITOR) 20 mg tablet Take  by mouth daily. , Historical Med      metFORMIN (GLUCOPHAGE) 850 mg tablet Take 850 mg by mouth two (2) times daily (with meals). , Historical Med      venlafaxine (EFFEXOR) 75 mg tablet Take 25 mg by mouth three (3) times daily. , Historical Med      !! insulin glargine (Lantus Solostar U-100 Insulin) 100 unit/mL (3 mL) inpn 20 Units by SubCUTAneous route nightly., Historical Med      medroxyPROGESTERone (DEPO-PROVERA) 150 mg/mL syrg 150 mg by IntraMUSCular route once., Historical Med       !! - Potential duplicate medications found. Please discuss with provider.         STOP taking these medications       fluconazole (Diflucan) 150 mg tablet Comments:   Reason for Stoppin.   Follow-up Information       Follow up With Specialties Details Why Contact Info    Follow up with your primary care physician  Schedule an appointment as soon as possible for a visit in 3 days As needed           Return to ED if worse     Diagnosis Clinical Impression:   1. Folliculitis        Please note that this dictation was completed with [employer], the computer voice recognition software. Quite often unanticipated grammatical, syntax, homophones, and other interpretive errors are inadvertently transcribed by the computer software. Please disregard these errors. Please excuse any errors that have escaped final proofreading. Thank you.

## 2023-02-27 ENCOUNTER — TELEPHONE (OUTPATIENT)
Dept: OBGYN CLINIC | Age: 28
End: 2023-02-27

## 2023-02-27 ENCOUNTER — HOSPITAL ENCOUNTER (EMERGENCY)
Age: 28
Discharge: HOME | End: 2023-02-27
Attending: FAMILY MEDICINE
Payer: MEDICAID

## 2023-02-27 VITALS
TEMPERATURE: 98.3 F | WEIGHT: 230 LBS | HEART RATE: 91 BPM | DIASTOLIC BLOOD PRESSURE: 83 MMHG | RESPIRATION RATE: 18 BRPM | SYSTOLIC BLOOD PRESSURE: 140 MMHG | BODY MASS INDEX: 36.1 KG/M2 | HEIGHT: 67 IN | OXYGEN SATURATION: 98 %

## 2023-02-27 DIAGNOSIS — L98.9 SKIN LESION: Primary | ICD-10-CM

## 2023-02-27 LAB — HCG UR QL: NEGATIVE

## 2023-02-27 PROCEDURE — 74011250637 HC RX REV CODE- 250/637: Performed by: FAMILY MEDICINE

## 2023-02-27 PROCEDURE — 81025 URINE PREGNANCY TEST: CPT

## 2023-02-27 PROCEDURE — 99283 EMERGENCY DEPT VISIT LOW MDM: CPT

## 2023-02-27 RX ORDER — HYDROCODONE BITARTRATE AND ACETAMINOPHEN 5; 325 MG/1; MG/1
1 TABLET ORAL
Status: COMPLETED | OUTPATIENT
Start: 2023-02-27 | End: 2023-02-27

## 2023-02-27 RX ORDER — HYDROCODONE BITARTRATE AND ACETAMINOPHEN 5; 325 MG/1; MG/1
1 TABLET ORAL
Qty: 5 TABLET | Refills: 0 | Status: SHIPPED | OUTPATIENT
Start: 2023-02-27 | End: 2023-03-02

## 2023-02-27 RX ADMIN — HYDROCODONE BITARTRATE AND ACETAMINOPHEN 1 TABLET: 5; 325 TABLET ORAL at 21:46

## 2023-02-27 NOTE — Clinical Note
[address]  [address] [place] [id number]  [phone]    Work/School Note    Date: 2/27/2023    To Whom It May concern:    Parish Loo was seen and treated today in the emergency room by the following provider(s):  Attending Provider: Génesis Stearns DO. Parish Loo is excused from work/school on 02/27/23 and 02/28/23. She is medically clear to return to work/school on 3/1/2023.        Sincerely,          Jersey Dueñas DO

## 2023-02-27 NOTE — TELEPHONE ENCOUNTER
Patient contacted the office reports that she has been calling and unable to get anyone on the phone. She reports needs an appointment with any provider for an annual exam.  Appt scheduled.

## 2023-02-28 NOTE — ED PROVIDER NOTES
EMERGENCY DEPARTMENT HISTORY AND PHYSICAL EXAM      Date: 2/27/2023  Patient Name: Mallorie Elmore    History of Presenting Illness     Chief Complaint   Patient presents with    Skin Problem       History Provided By:     HPI: Mallorie Elmore, is a very pleasant 32 y.o. female presenting to the ED with a chief complaint of skin problem. Patient states she noticed a bump just to the left of her vagina. She saw her OB/GYN who scheduled her for a biopsy this coming Monday. Patient states it has not changed in size. No drainage. However it has become progressively more painful. No lesions elsewhere. No abdominal pain. The patient denies any other symptoms at this time. PCP: Javy Cloud MD    No current facility-administered medications on file prior to encounter. Current Outpatient Medications on File Prior to Encounter   Medication Sig Dispense Refill    insulin glargine (Lantus Solostar U-100 Insulin) 100 unit/mL (3 mL) inpn 20 Units by SubCUTAneous route daily. 3 mL 0    omeprazole (PRILOSEC) 40 mg capsule Take 40 mg by mouth daily. metFORMIN (GLUCOPHAGE) 850 mg tablet Take 850 mg by mouth two (2) times daily (with meals). venlafaxine (EFFEXOR) 75 mg tablet Take 25 mg by mouth three (3) times daily. insulin glargine (Lantus Solostar U-100 Insulin) 100 unit/mL (3 mL) inpn 20 Units by SubCUTAneous route nightly. medroxyPROGESTERone (DEPO-PROVERA) 150 mg/mL syrg 150 mg by IntraMUSCular route once. lidocaine 4 % gel Apply twice daily as needed to labia (outer vaginal area) for irritation and pain. Do not apply with the clotrimazole at the same time. Indications: skin irritation (Patient not taking: Reported on 2/27/2023) 1 Each 0    trimethoprim-sulfamethoxazole (Bactrim DS) 160-800 mg per tablet Take 2 Tablets by mouth two (2) times a day.  (Patient not taking: Reported on 2/27/2023) 40 Tablet 0    naproxen (NAPROSYN) 500 mg tablet Take 1 Tablet by mouth every twelve (12) hours as needed for Pain. (Patient not taking: Reported on 2023) 20 Tablet 0    atorvastatin (LIPITOR) 20 mg tablet Take  by mouth daily. (Patient not taking: Reported on 2023)         Past History     Past Medical History:  Past Medical History:   Diagnosis Date    Anxiety     Depression     Diabetes ([address]     GERD (gastroesophageal reflux disease)     High cholesterol     Hypertension     Vaginal yeast infection     Vaginitis and vulvovaginitis        Past Surgical History:  Past Surgical History:   Procedure Laterality Date    HX  SECTION         Family History:  History reviewed. No pertinent family history. Social History:  Social History     Tobacco Use    Smoking status: Every Day     Packs/day: 0.50     Types: Cigarettes    Smokeless tobacco: Never   Substance Use Topics    Alcohol use: Not Currently    Drug use: Not Currently       Allergies:  No Known Allergies      Review of Systems     Review of Systems   Constitutional:  Negative for activity change, appetite change, chills, fatigue and fever. HENT:  Negative for congestion and sore throat. Eyes:  Negative for photophobia and visual disturbance. Respiratory:  Negative for cough, shortness of breath and wheezing. Cardiovascular:  Negative for chest pain, palpitations and leg swelling. Gastrointestinal:  Negative for abdominal pain, diarrhea, nausea and vomiting. Endocrine: Negative for cold intolerance and heat intolerance. Musculoskeletal:  Negative for gait problem and joint swelling. Skin:  Negative for color change and rash. Neurological:  Negative for dizziness and headaches. Physical Exam     Physical Exam  Constitutional:       Appearance: She is well-developed. HENT:      Head: Normocephalic and atraumatic. Mouth/Throat:      Mouth: Mucous membranes are moist.      Pharynx: Oropharynx is clear.    Eyes:      Conjunctiva/sclera: Conjunctivae normal.      Pupils: Pupils are equal, round, and reactive to light. Cardiovascular:      Rate and Rhythm: Normal rate and regular rhythm. Heart sounds: No murmur heard. Pulmonary:      Effort: No respiratory distress. Breath sounds: No stridor. No wheezing, rhonchi or rales. Abdominal:      General: There is no distension. Tenderness: There is no abdominal tenderness. There is no rebound. Musculoskeletal:      Cervical back: Normal range of motion and neck supple. Skin:     General: Skin is warm and dry. Comments: Tenderness and slight induration of the left labia. No palpable fluctuance. No cellulitis. No erythema nor warmth   Neurological:      General: No focal deficit present. Mental Status: She is alert and oriented to person, place, and time. Psychiatric:         Mood and Affect: Mood normal.         Behavior: Behavior normal.       Lab and Diagnostic Study Results     Labs -   No results found for this or any previous visit (from the past 12 hour(s)). Radiologic Studies -   @lastxrresult@  CT Results  (Last 48 hours)      None          CXR Results  (Last 48 hours)      None              Medical Decision Making   - I am the first provider for this patient. - I reviewed the vital signs, available nursing notes, past medical history, past surgical history, family history and social history. - Initial assessment performed. The patients presenting problems have been discussed, and they are in agreement with the care plan formulated and outlined with them. I have encouraged them to ask questions as they arise throughout their visit. Vital Signs-Reviewed the patient's vital signs.   Patient Vitals for the past 12 hrs:   Temp Pulse Resp BP SpO2   02/27/23 2130 98.3 °F (36.8 °C) 91 18 (!) 140/83 98 %       CONSULTS: (Who and What was discussed)  None      Chronic Conditions: Reviewed above     Social Determinants affecting Dx or Tx: None     Records Reviewed (source and summary of external notes): Nursing notes ED Course/ Provider Notes (Medical Decision Making):     Patient presented to the emergency department with the aforementioned chief complaint. On examination the patient is nontoxic. Vitals were reviewed per above. Patient currently in process of being worked up for this skin lesion. She has upcoming biopsy. No evidence of infectious process at this time. Will provide pain medication until she gets back in with OB/GYN    Lila Unger was given a thorough list of signs and symptoms that would warrant an immediate return to the emergency department. Otherwise Lila Unger will follow up with PCP. Procedures   Medical Decision Makingedical Decision Making  Performed by: Jarod Braswell, DO  Procedures  None       Disposition   Disposition:     Home     All of the diagnostic tests were reviewed and questions answered. Diagnosis, care plan and treatment options were discussed. The patient understands the instructions and will follow up as directed. The patients results have been reviewed with them. They have been counseled regarding their diagnosis. The patient verbally convey understanding and agreement of the signs, symptoms, diagnosis, treatment and prognosis and additionally agrees to follow up as recommended with their PCP in 24 - 48 hours. They also agree with the care-plan and convey that all of their questions have been answered. I have also put together some discharge instructions for them that include: 1) educational information regarding their diagnosis, 2) how to care for their diagnosis at home, as well a 3) list of reasons why they would want to return to the ED prior to their follow-up appointment, should their condition change. DISCHARGE PLAN:    1. Current Discharge Medication List        START taking these medications    Details   HYDROcodone-acetaminophen (Norco) 5-325 mg per tablet Take 1 Tablet by mouth every eight (8) hours as needed for Pain for up to 3 days. Max Daily Amount: 3 Tablets. Qty: 5 Tablet, Refills: 0    Associated Diagnoses: Skin lesion           CONTINUE these medications which have NOT CHANGED    Details   !! insulin glargine (Lantus Solostar U-100 Insulin) 100 unit/mL (3 mL) inpn 20 Units by SubCUTAneous route daily. Qty: 3 mL, Refills: 0      omeprazole (PRILOSEC) 40 mg capsule Take 40 mg by mouth daily. metFORMIN (GLUCOPHAGE) 850 mg tablet Take 850 mg by mouth two (2) times daily (with meals). venlafaxine (EFFEXOR) 75 mg tablet Take 25 mg by mouth three (3) times daily. !! insulin glargine (Lantus Solostar U-100 Insulin) 100 unit/mL (3 mL) inpn 20 Units by SubCUTAneous route nightly. medroxyPROGESTERone (DEPO-PROVERA) 150 mg/mL syrg 150 mg by IntraMUSCular route once. lidocaine 4 % gel Apply twice daily as needed to labia (outer vaginal area) for irritation and pain. Do not apply with the clotrimazole at the same time. Indications: skin irritation  Qty: 1 Each, Refills: 0      trimethoprim-sulfamethoxazole (Bactrim DS) 160-800 mg per tablet Take 2 Tablets by mouth two (2) times a day. Qty: 40 Tablet, Refills: 0      naproxen (NAPROSYN) 500 mg tablet Take 1 Tablet by mouth every twelve (12) hours as needed for Pain. Qty: 20 Tablet, Refills: 0      atorvastatin (LIPITOR) 20 mg tablet Take  by mouth daily. !! - Potential duplicate medications found. Please discuss with provider. 2.   Follow-up Information       Follow up With Specialties Details Why Contact Info    Please make your upcoming appointment with OB/GYN        Lalo Gonzalez MD Family Medicine Schedule an appointment as soon as possible for a visit   [address] [facility] [phone]                3.  Return to ED if worse       4.    Current Discharge Medication List        START taking these medications    Details   HYDROcodone-acetaminophen (Norco) 5-325 mg per tablet Take 1 Tablet by mouth every eight (8) hours as needed for Pain for up to 3 days. Max Daily Amount: 3 Tablets. Qty: 5 Tablet, Refills: 0  Start date: 2/27/2023, End date: 3/2/2023    Associated Diagnoses: Skin lesion               Diagnosis     Clinical Impression:    1. Skin lesion        Attestations:    Anastasia Barraza, DO    Please note that this dictation was completed with [employer], the computer voice recognition software. Quite often unanticipated grammatical, syntax, homophones, and other interpretive errors are inadvertently transcribed by the computer software. Please disregard these errors. Please excuse any errors that have escaped final proofreading. Thank you.

## 2023-02-28 NOTE — ED TRIAGE NOTES
Patient presents with abscess to perianal area. \[facility]\"" has surgery scheduled for March 13,2023. Finished antibiotics yesterday.  Tylenol taken at 1900

## 2023-03-03 ENCOUNTER — HOSPITAL ENCOUNTER (EMERGENCY)
Age: 28
Discharge: HOME | End: 2023-03-03
Attending: EMERGENCY MEDICINE
Payer: MEDICAID

## 2023-03-03 VITALS
HEART RATE: 76 BPM | DIASTOLIC BLOOD PRESSURE: 75 MMHG | OXYGEN SATURATION: 97 % | RESPIRATION RATE: 18 BRPM | TEMPERATURE: 98.5 F | SYSTOLIC BLOOD PRESSURE: 124 MMHG | WEIGHT: 230 LBS | HEIGHT: 67 IN | BODY MASS INDEX: 36.1 KG/M2

## 2023-03-03 DIAGNOSIS — L03.90 CELLULITIS, UNSPECIFIED CELLULITIS SITE: Primary | ICD-10-CM

## 2023-03-03 PROCEDURE — 74011250637 HC RX REV CODE- 250/637: Performed by: EMERGENCY MEDICINE

## 2023-03-03 PROCEDURE — 99283 EMERGENCY DEPT VISIT LOW MDM: CPT

## 2023-03-03 RX ORDER — DOXYCYCLINE HYCLATE 100 MG
100 TABLET ORAL 2 TIMES DAILY
Qty: 20 TABLET | Refills: 0 | Status: SHIPPED | OUTPATIENT
Start: 2023-03-03 | End: 2023-03-13

## 2023-03-03 RX ORDER — HYDROCODONE BITARTRATE AND ACETAMINOPHEN 5; 325 MG/1; MG/1
2 TABLET ORAL ONCE
Status: COMPLETED | OUTPATIENT
Start: 2023-03-03 | End: 2023-03-03

## 2023-03-03 RX ORDER — HYDROCODONE BITARTRATE AND ACETAMINOPHEN 5; 325 MG/1; MG/1
2 TABLET ORAL
Qty: 12 TABLET | Refills: 0 | Status: SHIPPED | OUTPATIENT
Start: 2023-03-03 | End: 2023-03-06

## 2023-03-03 RX ADMIN — HYDROCODONE BITARTRATE AND ACETAMINOPHEN 2 TABLET: 5; 325 TABLET ORAL at 17:00

## 2023-03-03 NOTE — ED PROVIDER NOTES
EMERGENCY DEPARTMENT HISTORY AND PHYSICAL EXAM      Date: 3/3/2023  Patient Name: Parish Loo    History of Presenting Illness     Chief Complaint   Patient presents with    Cyst    Pelvic Pain       History Provided By: Patient    HPI: Parish Loo, 32 y.o. female presents to the ED with CC of 68-year-old female with no past medical history significant for diabetes and vulvovaginitis presents with history of additional ingrown hairs and cellulitis in and around the vagina. Patient states that she was on antibiotics recently and analgesic and stopped taking antibiotics and then symptom started coming back. She does not think she was on them long enough. She also has an appointment next week with OB/GYN. Symptoms are mild, transient without exacerbating or relieving factors. .       Patient denies SOB, chest pain, or any neurological symptoms. There are no other complaints, changes, or physical findings at this time. Past History     Past Medical History:  Past Medical History:   Diagnosis Date    Anxiety     Depression     Diabetes ([address]     GERD (gastroesophageal reflux disease)     High cholesterol     Hypertension     Vaginal yeast infection     Vaginitis and vulvovaginitis        Allergies:  No Known Allergies    Review of Systems   Vital signs and nursing notes reviewed  Review of Systems   Constitutional: Negative. Negative for appetite change, chills, fatigue and fever. HENT: Negative. Negative for congestion and sinus pain. Eyes: Negative. Negative for pain and visual disturbance. Respiratory: Negative. Negative for chest tightness and shortness of breath. Cardiovascular: Negative. Negative for chest pain. Gastrointestinal: Negative. Negative for abdominal pain, diarrhea, nausea and vomiting. Genitourinary:  Positive for dyspareunia and genital sores. Negative for difficulty urinating. No discharge   Musculoskeletal: Negative. Negative for arthralgias.    Skin: Negative. Negative for rash. Neurological: Negative. Negative for weakness and headaches. Hematological: Negative. Psychiatric/Behavioral: Negative. Negative for agitation. The patient is not nervous/anxious. All other systems reviewed and are negative. Physical Exam   Visit Vitals  /75 (BP 1 Location: Right upper arm, BP Patient Position: At rest)   Pulse 76   Temp 98.5 °F (36.9 °C)   Resp 18   Ht 5' 7\" (1.702 m)   Wt 104.3 kg (230 lb)   SpO2 97%   BMI 36.02 kg/m²     CONSTITUTIONAL: Alert, in no distress. Appears stated age. HEAD:  Normocephalic, atraumatic  EYES: EOM intact. No conjunctival injection or scleral icterus  Neck:  Supple. No meningismus  RESP: Normal with no work of breathing, speaking in full sentences. CV: Well perfused. NEURO: Alert with normal mentation, moving extremities spontaneously  MSK: FROM of all extremities without neuro, motor, vascular or sensory embarassment  ENT: clear without erythema, exudate or edema  Vulva: Mild area of cellulitis and inguinal lymphadenopathy. No appreciable pointing draining area. Medical Decision Making     ED Course:   Initial assessment performed. The patients presenting problems have been discussed, and they are in agreement with the care plan formulated and outlined with them. I have encouraged them to ask questions as they arise throughout their visit. We will treat with antibiotics as an outpatient    Critical Care Time: None    Disposition:  DISCHARGE NOTE:  The pt is ready for discharge. The pt's signs, symptoms, diagnosis, and discharge instructions have been discussed and pt has conveyed their understanding. The pt is to follow up as recommended or return to ER should their symptoms worsen. Plan has been discussed and pt is in agreement. PLAN:  1.    Current Discharge Medication List        START taking these medications    Details   HYDROcodone-acetaminophen (NORCO) 5-325 mg per tablet Take 2 Tablets by mouth every four (4) hours as needed for Pain for up to 3 days. Max Daily Amount: 12 Tablets. Qty: 12 Tablet, Refills: 0  Start date: 3/3/2023, End date: 3/6/2023    Associated Diagnoses: Cellulitis, unspecified cellulitis site      doxycycline (VIBRA-TABS) 100 mg tablet Take 1 Tablet by mouth two (2) times a day for 10 days. Qty: 20 Tablet, Refills: 0  Start date: 3/3/2023, End date: 3/13/2023           2. Follow-up Information       Follow up With Specialties Details Why Contact Info    Trent Stauffer MD Family Medicine Call in 2 days As needed, If symptoms worsen [address]  [phone]            3. Take Tylenol or Ibuprofen as needed  4. Drink plenty of fluids  5. Return to ED if worse especially if any shortness of breath, chest pain or altered mentation. Diagnosis     Clinical Impression:   1. Cellulitis, unspecified cellulitis site            Please note that this dictation was completed with [employer], the computer voice recognition software. Quite often unanticipated grammatical, syntax, homophones, and other interpretive errors are inadvertently transcribed by the computer software. Please   disregards these errors. Please excuse any errors that have escaped final proofreading.

## 2023-03-06 ENCOUNTER — HOSPITAL ENCOUNTER (EMERGENCY)
Age: 28
Discharge: HOME | End: 2023-03-06
Attending: STUDENT IN AN ORGANIZED HEALTH CARE EDUCATION/TRAINING PROGRAM
Payer: MEDICAID

## 2023-03-06 ENCOUNTER — HOSPITAL ENCOUNTER (EMERGENCY)
Age: 28
Discharge: HOME | End: 2023-03-06
Attending: FAMILY MEDICINE
Payer: MEDICAID

## 2023-03-06 VITALS
RESPIRATION RATE: 18 BRPM | HEIGHT: 67 IN | DIASTOLIC BLOOD PRESSURE: 80 MMHG | BODY MASS INDEX: 36.1 KG/M2 | WEIGHT: 230 LBS | OXYGEN SATURATION: 99 % | TEMPERATURE: 98.2 F | SYSTOLIC BLOOD PRESSURE: 142 MMHG | HEART RATE: 82 BPM

## 2023-03-06 VITALS
SYSTOLIC BLOOD PRESSURE: 158 MMHG | OXYGEN SATURATION: 98 % | WEIGHT: 230 LBS | RESPIRATION RATE: 20 BRPM | TEMPERATURE: 98.8 F | HEART RATE: 102 BPM | DIASTOLIC BLOOD PRESSURE: 102 MMHG | HEIGHT: 67 IN | BODY MASS INDEX: 36.1 KG/M2

## 2023-03-06 DIAGNOSIS — N94.89 LABIAL PAIN: Primary | ICD-10-CM

## 2023-03-06 DIAGNOSIS — L03.315 CELLULITIS OF PERINEUM: Primary | ICD-10-CM

## 2023-03-06 PROCEDURE — 99283 EMERGENCY DEPT VISIT LOW MDM: CPT

## 2023-03-06 PROCEDURE — 99282 EMERGENCY DEPT VISIT SF MDM: CPT

## 2023-03-06 PROCEDURE — 74011250637 HC RX REV CODE- 250/637: Performed by: STUDENT IN AN ORGANIZED HEALTH CARE EDUCATION/TRAINING PROGRAM

## 2023-03-06 RX ORDER — CLINDAMYCIN HYDROCHLORIDE 300 MG/1
300 CAPSULE ORAL 4 TIMES DAILY
Qty: 28 CAPSULE | Refills: 0 | Status: SHIPPED | OUTPATIENT
Start: 2023-03-06 | End: 2023-03-13

## 2023-03-06 RX ORDER — KETOROLAC TROMETHAMINE 30 MG/ML
30 INJECTION, SOLUTION INTRAMUSCULAR; INTRAVENOUS
Status: DISCONTINUED | OUTPATIENT
Start: 2023-03-06 | End: 2023-03-06 | Stop reason: HOSPADM

## 2023-03-06 RX ORDER — OXYCODONE HYDROCHLORIDE 5 MG/1
5 TABLET ORAL
Status: COMPLETED | OUTPATIENT
Start: 2023-03-06 | End: 2023-03-06

## 2023-03-06 RX ORDER — CEFIXIME 400 MG/1
400 CAPSULE ORAL DAILY
Qty: 7 CAPSULE | Refills: 0 | Status: SHIPPED | OUTPATIENT
Start: 2023-03-06 | End: 2023-03-13

## 2023-03-06 RX ORDER — OXYCODONE HYDROCHLORIDE 10 MG/1
5 TABLET ORAL
Qty: 4 TABLET | Refills: 0 | Status: SHIPPED | OUTPATIENT
Start: 2023-03-06 | End: 2023-03-08

## 2023-03-06 RX ORDER — CLINDAMYCIN HYDROCHLORIDE 150 MG/1
300 CAPSULE ORAL
Status: COMPLETED | OUTPATIENT
Start: 2023-03-06 | End: 2023-03-06

## 2023-03-06 RX ORDER — CEFIXIME 400 MG/1
400 CAPSULE ORAL ONCE
Status: COMPLETED | OUTPATIENT
Start: 2023-03-06 | End: 2023-03-06

## 2023-03-06 RX ADMIN — CLINDAMYCIN HYDROCHLORIDE 300 MG: 150 CAPSULE ORAL at 03:35

## 2023-03-06 RX ADMIN — OXYCODONE 5 MG: 5 TABLET ORAL at 03:35

## 2023-03-06 RX ADMIN — CEFIXIME 400 MG: 400 CAPSULE ORAL at 04:02

## 2023-03-06 NOTE — Clinical Note
[facility] EMERGENCY DEPT  [address] [place] [address] [place] [address]  [phone]    Work/School Note    Date: 3/6/2023    To Whom It May concern:    Lynnette Pinto was seen and treated today in the emergency room by the following provider(s):  Attending Provider: Jaswinder Aragon MD.      Lynnette Pinto is excused from work/school on 03/06/23 and 03/07/23. She is medically clear to return to work/school on 3/8/2023.        Sincerely,          Garett Banks MD

## 2023-03-06 NOTE — ED PROVIDER NOTES
EMERGENCY DEPARTMENT HISTORY AND PHYSICAL EXAM      Date: 3/6/2023  Patient Name: Luise Bumpers    History of Presenting Illness         History Provided By:     HPI: Luise Bumpers, is a very pleasant 32 y.o. female presenting to the ED with a chief complaint of labia pain. Patient has been seen in the emergency department several times prior for similar. She states she was diagnosed with cellulitis and vaginal candidiasis. She is taking antibiotics faithfully. Also taking antifungal as prescribed. She has an appointment with her OB/GYN tomorrow for a biopsy. Patient denies any changes in her symptoms but states her left labia is still painful. No redness or warmth. No fevers. No vaginal discharge. No abdominal pain. The patient denies any other symptoms at this time. PCP: Andres Douglas MD    Current Facility-Administered Medications on File Prior to Encounter   Medication Dose Route Frequency Provider Last Rate Last Admin    [COMPLETED] oxyCODONE IR (ROXICODONE) tablet 5 mg  5 mg Oral NOW Hernan Jones MD   5 mg at 03/06/23 0335    [COMPLETED] cefixime (SUPRAX) capsule 400 mg  400 mg Oral ONCE Hernan Jones MD   400 mg at 03/06/23 0402    [COMPLETED] clindamycin (CLEOCIN) capsule 300 mg  300 mg Oral NOW Hernan Jones MD   300 mg at 03/06/23 7923     Current Outpatient Medications on File Prior to Encounter   Medication Sig Dispense Refill    cefixime (SUPRAX) 400 mg capsule Take 1 Capsule by mouth daily for 7 days. 7 Capsule 0    clindamycin (CLEOCIN) 300 mg capsule Take 1 Capsule by mouth four (4) times daily for 7 days. 28 Capsule 0    oxyCODONE IR (ROXICODONE) 10 mg tab immediate release tablet Take 0.5 Tablets by mouth every six (6) hours as needed for Pain for up to 2 days. Max Daily Amount: 20 mg. 4 Tablet 0    HYDROcodone-acetaminophen (NORCO) 5-325 mg per tablet Take 2 Tablets by mouth every four (4) hours as needed for Pain for up to 3 days. Max Daily Amount: 12 Tablets.  12 Tablet 0    doxycycline (VIBRA-TABS) 100 mg tablet Take 1 Tablet by mouth two (2) times a day for 10 days. 20 Tablet 0    insulin glargine (Lantus Solostar U-100 Insulin) 100 unit/mL (3 mL) inpn 20 Units by SubCUTAneous route daily. 3 mL 0    lidocaine 4 % gel Apply twice daily as needed to labia (outer vaginal area) for irritation and pain. Do not apply with the clotrimazole at the same time. Indications: skin irritation (Patient not taking: Reported on 2023) 1 Each 0    trimethoprim-sulfamethoxazole (Bactrim DS) 160-800 mg per tablet Take 2 Tablets by mouth two (2) times a day. (Patient not taking: Reported on 2023) 40 Tablet 0    naproxen (NAPROSYN) 500 mg tablet Take 1 Tablet by mouth every twelve (12) hours as needed for Pain. (Patient not taking: Reported on 2023) 20 Tablet 0    omeprazole (PRILOSEC) 40 mg capsule Take 40 mg by mouth daily. atorvastatin (LIPITOR) 20 mg tablet Take  by mouth daily. (Patient not taking: Reported on 2023)      metFORMIN (GLUCOPHAGE) 850 mg tablet Take 850 mg by mouth two (2) times daily (with meals). venlafaxine (EFFEXOR) 75 mg tablet Take 25 mg by mouth three (3) times daily. insulin glargine (Lantus Solostar U-100 Insulin) 100 unit/mL (3 mL) inpn 20 Units by SubCUTAneous route nightly. medroxyPROGESTERone (DEPO-PROVERA) 150 mg/mL syrg 150 mg by IntraMUSCular route once. Past History     Past Medical History:  Past Medical History:   Diagnosis Date    Anxiety     Depression     Diabetes ([address]     GERD (gastroesophageal reflux disease)     High cholesterol     Hypertension     Vaginal yeast infection     Vaginitis and vulvovaginitis        Past Surgical History:  Past Surgical History:   Procedure Laterality Date    HX  SECTION         Family History:  History reviewed. No pertinent family history.     Social History:  Social History     Tobacco Use    Smoking status: Every Day     Packs/day: 0.50     Types: Cigarettes Smokeless tobacco: Never   Substance Use Topics    Alcohol use: Not Currently    Drug use: Not Currently       Allergies:  No Known Allergies      Review of Systems     Review of Systems   Constitutional:  Negative for activity change, appetite change, chills, fatigue and fever. HENT:  Negative for congestion and sore throat. Eyes:  Negative for photophobia and visual disturbance. Respiratory:  Negative for cough, shortness of breath and wheezing. Cardiovascular:  Negative for chest pain, palpitations and leg swelling. Gastrointestinal:  Negative for abdominal pain, diarrhea, nausea and vomiting. Endocrine: Negative for cold intolerance and heat intolerance. Genitourinary:  Negative for dysuria and vaginal bleeding. Musculoskeletal:  Negative for gait problem and joint swelling. Skin:  Negative for color change and rash. Neurological:  Negative for dizziness and headaches. Physical Exam     Physical Exam  Constitutional:       Appearance: She is well-developed. HENT:      Head: Normocephalic and atraumatic. Mouth/Throat:      Mouth: Mucous membranes are moist.      Pharynx: Oropharynx is clear. Eyes:      Conjunctiva/sclera: Conjunctivae normal.      Pupils: Pupils are equal, round, and reactive to light. Cardiovascular:      Rate and Rhythm: Normal rate and regular rhythm. Heart sounds: No murmur heard. Pulmonary:      Effort: No respiratory distress. Breath sounds: No stridor. No wheezing, rhonchi or rales. Abdominal:      General: There is no distension. Tenderness: There is no abdominal tenderness. There is no rebound. Musculoskeletal:      Cervical back: Normal range of motion and neck supple. Skin:     General: Skin is warm and dry. Comments: Vaginal exam performed with female chaperone at bedside. Left labia is slightly indurated, no fluctuance, no erythema nor warmth. No palpable mass. Neurological:      General: No focal deficit present. Mental Status: She is alert and oriented to person, place, and time. Psychiatric:         Mood and Affect: Mood normal.         Behavior: Behavior normal.       Lab and Diagnostic Study Results     Labs -   No results found for this or any previous visit (from the past 12 hour(s)). Radiologic Studies -   @lastxrresult@  CT Results  (Last 48 hours)      None          CXR Results  (Last 48 hours)      None              Medical Decision Making   - I am the first provider for this patient. - I reviewed the vital signs, available nursing notes, past medical history, past surgical history, family history and social history. - Initial assessment performed. The patients presenting problems have been discussed, and they are in agreement with the care plan formulated and outlined with them. I have encouraged them to ask questions as they arise throughout their visit. Vital Signs-Reviewed the patient's vital signs. Patient Vitals for the past 12 hrs:   Temp Pulse Resp BP SpO2   03/06/23 0712 98.8 °F (37.1 °C) (!) 102 20 (!) 158/102 98 %       CONSULTS: (Who and What was discussed)  None      Chronic Conditions: Reviewed above     Social Determinants affecting Dx or Tx: None     Records Reviewed (source and summary of external notes): Nursing notes           ED Course/ Provider Notes (Medical Decision Making):     Patient presented to the emergency department with the aforementioned chief complaint. On examination the patient is nontoxic. Vitals were reviewed per above. Patient's labia is slightly indurated without evidence of overt cellulitis. She is already on antibiotic and antifungal.  She has appointment with OB/GYN tomorrow for possible biopsy. Discussed pain options with patient however she asked  to be discharged home after discussion that opioids would not be prescribed at this time. Declines pain medication in emergency department.   She understands importance of following up with her OB/GYN at her appointment tomorrow. Patient left the emergency department before her discharge paperwork could be provided. Og Lazaro was given a thorough list of signs and symptoms that would warrant an immediate return to the emergency department. Otherwise Og Lazaro will follow up with PCP. Procedures   Medical Decision Makingedical Decision Making  Performed by: Shari Albarran DO  Procedures  None       Disposition   Disposition:     Home     All of the diagnostic tests were reviewed and questions answered. Diagnosis, care plan and treatment options were discussed. The patient understands the instructions and will follow up as directed. The patients results have been reviewed with them. They have been counseled regarding their diagnosis. The patient verbally convey understanding and agreement of the signs, symptoms, diagnosis, treatment and prognosis and additionally agrees to follow up as recommended with their PCP in 24 - 48 hours. They also agree with the care-plan and convey that all of their questions have been answered. I have also put together some discharge instructions for them that include: 1) educational information regarding their diagnosis, 2) how to care for their diagnosis at home, as well a 3) list of reasons why they would want to return to the ED prior to their follow-up appointment, should their condition change. Patient left however before paperwork could be provided        DISCHARGE PLAN:    1. Cannot display discharge medications since this patient is not currently admitted. 2.   Follow-up Information       Follow up With Specialties Details Why Contact Info    Your primary care doctor  Schedule an appointment as soon as possible for a visit in 2 days      Please make your apt with OB tomorrow                  3.  Return to ED if worse       4. Discharge Medication List as of 3/6/2023  8:06 AM            Diagnosis     Clinical Impression:    1.  Labial pain Attestations:    Fredi Huerta, DO    Please note that this dictation was completed with charming charlie, the computer voice recognition software. Quite often unanticipated grammatical, syntax, homophones, and other interpretive errors are inadvertently transcribed by the computer software. Please disregard these errors. Please excuse any errors that have escaped final proofreading. Thank you.

## 2023-03-06 NOTE — ED TRIAGE NOTES
Pt c/o groin pain from an abscess and that her \"privates\" sting. Pt states this has been going on for 2 weeks to 1 month. States she is a diabetic. Pt was seen in [facility] this am for same.

## 2023-03-06 NOTE — ED PROVIDER NOTES
[id number]  EMERGENCY DEPARTMENT ENCOUNTER NOTE    Date: 3/6/2023  Patient Name: Arabella Tinajero    History of Presenting Illness     Chief Complaint   Patient presents with    Other       History obtained from: Patient    HPI: Arabella Tinajero, 32 y.o. female with a past medical history and outpatient medications as listed and reviewed below  presents for vaginal pain and burning. She has a history of infection that she is being treated for however she started having worsening pain today. She had no discharge however reports sharp pains and mild swelling over the bilateral labia majora. No fevers or chills. No dysuria or hematuria. .    Medical History   I reviewed the medical, surgical, family, and social history, as well as allergies:    PCP: Valerie Kaye MD    Past Medical History:  Past Medical History:   Diagnosis Date    Anxiety     Depression     Diabetes ([facility] [address]     GERD (gastroesophageal reflux disease)     High cholesterol     Hypertension     Vaginal yeast infection     Vaginitis and vulvovaginitis      Past Surgical History:  Past Surgical History:   Procedure Laterality Date    HX  SECTION       Current Outpatient Medications:  Current Outpatient Medications   Medication Instructions    atorvastatin (LIPITOR) 20 mg tablet DAILY    cefixime (SUPRAX) 400 mg, Oral, DAILY    clindamycin (CLEOCIN) 300 mg, Oral, 4 TIMES DAILY    doxycycline (VIBRA-TABS) 100 mg, Oral, 2 TIMES DAILY    HYDROcodone-acetaminophen (NORCO) 5-325 mg per tablet 2 Tablets, Oral, EVERY 4 HOURS AS NEEDED    insulin glargine (LANTUS SOLOSTAR U-100 INSULIN) 20 Units, SubCUTAneous, DAILY    Lantus Solostar U-100 Insulin 20 Units, SubCUTAneous, EVERY BEDTIME    lidocaine 4 % gel Apply twice daily as needed to labia (outer vaginal area) for irritation and pain. Do not apply with the clotrimazole at the same time.     medroxyPROGESTERone (DEPO-PROVERA) 150 mg, IntraMUSCular, ONCE metFORMIN (GLUCOPHAGE) 850 mg, Oral, 2 TIMES DAILY WITH MEALS    naproxen (NAPROSYN) 500 mg, Oral, EVERY 12 HOURS AS NEEDED    omeprazole (PRILOSEC) 40 mg, Oral, DAILY    oxyCODONE IR (ROXICODONE) 5 mg, Oral, EVERY 6 HOURS AS NEEDED    trimethoprim-sulfamethoxazole (Bactrim DS) 160-800 mg per tablet 2 Tablets, Oral, 2 TIMES DAILY    venlafaxine (EFFEXOR) 25 mg, Oral, 3 TIMES DAILY      Family History:  History reviewed. No pertinent family history. Social History:  Social History     Tobacco Use    Smoking status: Every Day     Packs/day: 0.50     Types: Cigarettes    Smokeless tobacco: Never   Substance Use Topics    Alcohol use: Not Currently    Drug use: Not Currently     Allergies:  No Known Allergies    Review of Systems     Review of Systems  Negative: Positives and pertinent negatives as per HPI. All other systems were reviewed and are negative. Physical Exam & Vital Signs   Vital Signs - I reviewed the patient's vital signs. Patient Vitals for the past 12 hrs:   Temp Pulse Resp BP SpO2   03/06/23 0257 98.1 °F (36.7 °C) 83 20 (!) 143/95 100 %     Physical Exam:    GENERAL: not in apparent distress  HEENT:  * EOMI  * Head atraumatic  CV:  * warm extremities  * no cyanosis  PULMONARY: no respiratory distress, non labored breathing, no audible wheezing or stridor, no accessory muscle use  ABDOMEN: soft, moving in bed and pulls to seated position without grimace or pain  EXTREMITIES/BACK: moving four extremities without limitation  SKIN: Cellulitis over the perineum, small, with a small area of induration over the left labia majora. No blanquita Bartholin cyst noted. No abscess. No drainage. NEURO:  * Speech clear  * GCS 15    Medical Decision Making     Patient is a 32 y.o. female presenting for vaginal pain. Vitals reveal no significant abnormalities and physical exam reveals  perineal cellulitis - mild .  Based on the history, physical exam, risk factors, and vital signs, differential includes: cellulitis, early bartholin abscess. See ED Course and Reassessment for evaluation and discussion. Consultant Discussions/Recs: None  Records Reviewed: Nursing Notes  Social Determinants of health affecting management: None    ED Course & Reassessment     ED Course:          Reassessment:    Understanding was insured that at this time there is no evidence for a more malignant underlying process, but that early in the process of an illness, an emergency department workup can be falsely reassuring. Routine discharge counseling was given including the fact that any worsening, changing or persistent symptoms should prompt an immediate call or follow up with their primary physician or the emergency department. The importance of appropriate follow up was also discussed. More extensive discharge instructions were given in the patient's discharge paperwork. After completion of evaluation and discussion of results and diagnoses, all the questions were answered. If required, all follow up appointments and treatments were discussed and explained. Understanding was insured prior to discharge. Diagnosis     Clinical Impression:   1. Cellulitis of perineum        Final Disposition     Discharge: DISCHARGED FROM EMERGENCY DEPARTMENT    Patient will be discharged from the Emergency Department in stable condition. All of the diagnostic tests were reviewed and any questions were answered. Diagnosis, results, follow up if applicable, and return precautions were discussed. I have also put together printed discharge instructions for them that include: 1) educational information regarding their diagnosis, 2) how to care for their diagnosis at home, as well a 3) list of reasons why they would want to return to the ED prior to their follow-up appointment, should their condition change. Any labs or imaging done in the ED will be either printed with the discharge paperwork or available through [address]     DISCHARGE PLAN:  1. Current Discharge Medication List        START taking these medications    Details   cefixime (SUPRAX) 400 mg capsule Take 1 Capsule by mouth daily for 7 days. Qty: 7 Capsule, Refills: 0      clindamycin (CLEOCIN) 300 mg capsule Take 1 Capsule by mouth four (4) times daily for 7 days. Qty: 28 Capsule, Refills: 0      oxyCODONE IR (ROXICODONE) 10 mg tab immediate release tablet Take 0.5 Tablets by mouth every six (6) hours as needed for Pain for up to 2 days. Max Daily Amount: 20 mg.  Qty: 4 Tablet, Refills: 0    Associated Diagnoses: Cellulitis of perineum           CONTINUE these medications which have NOT CHANGED    Details   HYDROcodone-acetaminophen (NORCO) 5-325 mg per tablet Take 2 Tablets by mouth every four (4) hours as needed for Pain for up to 3 days. Max Daily Amount: 12 Tablets. Qty: 12 Tablet, Refills: 0    Associated Diagnoses: Cellulitis, unspecified cellulitis site      doxycycline (VIBRA-TABS) 100 mg tablet Take 1 Tablet by mouth two (2) times a day for 10 days. Qty: 20 Tablet, Refills: 0      !! insulin glargine (Lantus Solostar U-100 Insulin) 100 unit/mL (3 mL) inpn 20 Units by SubCUTAneous route daily. Qty: 3 mL, Refills: 0      lidocaine 4 % gel Apply twice daily as needed to labia (outer vaginal area) for irritation and pain. Do not apply with the clotrimazole at the same time. Indications: skin irritation  Qty: 1 Each, Refills: 0      trimethoprim-sulfamethoxazole (Bactrim DS) 160-800 mg per tablet Take 2 Tablets by mouth two (2) times a day. Qty: 40 Tablet, Refills: 0      naproxen (NAPROSYN) 500 mg tablet Take 1 Tablet by mouth every twelve (12) hours as needed for Pain. Qty: 20 Tablet, Refills: 0      omeprazole (PRILOSEC) 40 mg capsule Take 40 mg by mouth daily. atorvastatin (LIPITOR) 20 mg tablet Take  by mouth daily. metFORMIN (GLUCOPHAGE) 850 mg tablet Take 850 mg by mouth two (2) times daily (with meals).       venlafaxine (EFFEXOR) 75 mg tablet Take 25 mg by mouth three (3) times daily. !! insulin glargine (Lantus Solostar U-100 Insulin) 100 unit/mL (3 mL) inpn 20 Units by SubCUTAneous route nightly. medroxyPROGESTERone (DEPO-PROVERA) 150 mg/mL syrg 150 mg by IntraMUSCular route once. !! - Potential duplicate medications found. Please discuss with provider. 2.   Follow-up Information       Follow up With Specialties Details Why [place] [facility] Schedule an appointment as soon as possible for a visit in 3 days  [address] [facility] [address]  [phone]      [address] [place] [address] DEPT Emergency Medicine Go to  If symptoms worsen [address] [address]  [phone]          3. Return to ED if worse    4. Current Discharge Medication List        START taking these medications    Details   cefixime (SUPRAX) 400 mg capsule Take 1 Capsule by mouth daily for 7 days. Qty: 7 Capsule, Refills: 0  Start date: 3/6/2023, End date: 3/13/2023      clindamycin (CLEOCIN) 300 mg capsule Take 1 Capsule by mouth four (4) times daily for 7 days. Qty: 28 Capsule, Refills: 0  Start date: 3/6/2023, End date: 3/13/2023      oxyCODONE IR (ROXICODONE) 10 mg tab immediate release tablet Take 0.5 Tablets by mouth every six (6) hours as needed for Pain for up to 2 days. Max Daily Amount: 20 mg.  Qty: 4 Tablet, Refills: 0  Start date: 3/6/2023, End date: 3/8/2023    Associated Diagnoses: Cellulitis of perineum             Procedures, Critical Care, & Clinical Tools   Performed by: Delfin Pereira MD  Procedures     Not Applicable     Results, Consults, Medications     Consults:  None   Labs:  No results found for this or any previous visit (from the past 12 hour(s)).   Radiologic Studies:  CT Results  (Last 48 hours)      None          CXR Results  (Last 48 hours)      None          Medications ordered:  Medications   cefixime (SUPRAX) capsule 400 mg (has no administration in time range) oxyCODONE IR (ROXICODONE) tablet 5 mg (5 mg Oral Given 3/6/23 0335)   clindamycin (CLEOCIN) capsule 300 mg (300 mg Oral Given 3/6/23 0335)       Documentation Comments   - I am the first and primary provider for this patient and am the primary provider of record. - Initial assessment performed. The patients presenting problems have been discussed, and the staff are in agreement with the care plan formulated and outlined with them. I have encouraged them to ask questions as they arise throughout their visit. - Available medical records, nursing notes, old EKGs, and EMS run sheets (if patient was EMS transported) were reviewed    Please note that this dictation was completed with [email or web address], the computer voice recognition software. Quite often unanticipated grammatical, syntax, homophones, and other interpretive errors are inadvertently transcribed by the computer software. Please disregard these errors. Please excuse any errors that have escaped final proofreading.

## 2023-03-06 NOTE — ED TRIAGE NOTES
Pt c/o groin pain from an abscess and that her \"privates\" sting. Pt states this has been going on for 2 weeks to 1 month. States she is a diabetic.

## 2023-03-06 NOTE — Clinical Note
[address]  [address] [place] [id number]  [phone]    Work/School Note    Date: 3/6/2023    To Whom It May concern:    Pam Stacy was seen and treated today in the emergency room by the following provider(s):  Attending Provider: Mariya Rousseau DO. Pam Stacy is excused from work/school on 03/06/23 and 03/07/23. She is medically clear to return to work/school on 3/8/2023.        Sincerely,          Debbie Poon DO

## 2023-03-06 NOTE — DISCHARGE INSTRUCTIONS
Thank you! Thank you for allowing me to care for you in the emergency department. I sincerely hope that you are satisfied with your visit today. It is my goal to provide you with excellent care. Below you will find a list of your labs and imaging from your visit today if applicable. Should you have any questions regarding these results please do not hesitate to call the emergency department. Please review [employer] for a more detailed result list since the below list may not be comprehensive. Instructions on how to sign up to [employer] should be provided in this packet. Labs -   No results found for this or any previous visit (from the past 12 hour(s)). Radiologic Studies -   No orders to display     CT Results  (Last 48 hours)      None          CXR Results  (Last 48 hours)      None               If you feel that you have not received excellent quality care or timely care, please ask to speak to the nurse manager. Please choose us in the future for your continued health care needs. ------------------------------------------------------------------------------------------------------------  The exam and treatment you received in the Emergency Department were for an urgent problem and are not intended as complete care. It is important that you follow-up with a doctor, nurse practitioner, or physician assistant to:  (1) confirm your diagnosis,  (2) re-evaluation of changes in your illness and treatment, and  (3) for ongoing care. If your symptoms become worse or you do not improve as expected and you are unable to reach your usual health care provider, you should return to the Emergency Department. We are available 24 hours a day. Please take your discharge instructions with you when you go to your follow-up appointment. If a prescription has been provided, please have it filled as soon as possible to prevent a delay in treatment.  Read the entire medication instruction sheet provided to you by the pharmacy. If you have any questions or reservations about taking the medication due to side effects or interactions with other medications, please call your primary care physician or contact the ER to speak with the charge nurse. Make an appointment with your family doctor or the physician you were referred to for follow-up of this visit as instructed on your discharge paperwork, as this is a mandatory follow-up. Return to the ER if you are unable to be seen or if you are unable to be seen in a timely manner. If you have any problem arranging the follow-up visit, contact the Emergency Department immediately.

## 2023-03-07 ENCOUNTER — OFFICE VISIT (OUTPATIENT)
Dept: OBGYN CLINIC | Age: 28
End: 2023-03-07
Payer: MEDICAID

## 2023-03-07 VITALS — SYSTOLIC BLOOD PRESSURE: 137 MMHG | BODY MASS INDEX: 34.86 KG/M2 | DIASTOLIC BLOOD PRESSURE: 80 MMHG | WEIGHT: 222.6 LBS

## 2023-03-07 DIAGNOSIS — N89.8 VAGINAL LESION: Primary | ICD-10-CM

## 2023-03-07 DIAGNOSIS — Z01.419 ROUTINE GYNECOLOGICAL EXAMINATION: ICD-10-CM

## 2023-03-07 DIAGNOSIS — N92.6 IRREGULAR MENSES: ICD-10-CM

## 2023-03-07 PROCEDURE — 99395 PREV VISIT EST AGE 18-39: CPT | Performed by: OBSTETRICS & GYNECOLOGY

## 2023-03-07 RX ORDER — MEDROXYPROGESTERONE ACETATE 150 MG/ML
150 INJECTION, SUSPENSION INTRAMUSCULAR ONCE
Qty: 1 EACH | Refills: 3 | Status: SHIPPED | OUTPATIENT
Start: 2023-03-07 | End: 2023-03-07

## 2023-03-07 RX ORDER — CEPHALEXIN 500 MG/1
500 CAPSULE ORAL 2 TIMES DAILY
Qty: 20 CAPSULE | Refills: 0 | Status: SHIPPED | OUTPATIENT
Start: 2023-03-07 | End: 2023-03-17

## 2023-03-07 RX ORDER — ADHESIVE TAPE 1.5"X360"
TAPE, NON-MEDICATED TOPICAL 3 TIMES DAILY
Qty: 227 G | Refills: 0 | Status: SHIPPED | OUTPATIENT
Start: 2023-03-07

## 2023-03-07 NOTE — PROGRESS NOTES
HISTORY OF PRESENT ILLNESS  Anamaria Velasquez is a 32 y.o. female who presents today for the following:  Chief Complaint   Patient presents with    Annual Exam   Is a 25-year-old G2, P2 female who presents today for routine annual exam.  She complains of genital pain and burning. She reports that she has been to 2 ER visits but no one has done an exam.  She has taken fluconazole without relief. She is currently markedly uncomfortable and is unable to keep her hands away from her external genitalia.     No Known Allergies    Current Outpatient Medications   Medication Sig    cefixime (SUPRAX) 400 mg capsule Take 1 Capsule by mouth daily for 7 days. clindamycin (CLEOCIN) 300 mg capsule Take 1 Capsule by mouth four (4) times daily for 7 days. oxyCODONE IR (ROXICODONE) 10 mg tab immediate release tablet Take 0.5 Tablets by mouth every six (6) hours as needed for Pain for up to 2 days. Max Daily Amount: 20 mg.    doxycycline (VIBRA-TABS) 100 mg tablet Take 1 Tablet by mouth two (2) times a day for 10 days. insulin glargine (Lantus Solostar U-100 Insulin) 100 unit/mL (3 mL) inpn 20 Units by SubCUTAneous route daily. lidocaine 4 % gel Apply twice daily as needed to labia (outer vaginal area) for irritation and pain. Do not apply with the clotrimazole at the same time. Indications: skin irritation (Patient not taking: Reported on 2023)    omeprazole (PRILOSEC) 40 mg capsule Take 40 mg by mouth daily. metFORMIN (GLUCOPHAGE) 850 mg tablet Take 850 mg by mouth two (2) times daily (with meals). venlafaxine (EFFEXOR) 75 mg tablet Take 25 mg by mouth three (3) times daily. insulin glargine (Lantus Solostar U-100 Insulin) 100 unit/mL (3 mL) inpn 20 Units by SubCUTAneous route nightly. medroxyPROGESTERone (DEPO-PROVERA) 150 mg/mL syrg 150 mg by IntraMUSCular route once. No current facility-administered medications for this visit.        Past Medical History:   Diagnosis Date    Anxiety Depression     Diabetes ([facility] [address]     GERD (gastroesophageal reflux disease)     High cholesterol     Hypertension     Vaginal yeast infection     Vaginitis and vulvovaginitis        Past Surgical History:   Procedure Laterality Date    HX  SECTION         No family history on file. Social History     Socioeconomic History    Marital status: SINGLE     Spouse name: Not on file    Number of children: Not on file    Years of education: Not on file    Highest education level: Not on file   Occupational History    Not on file   Tobacco Use    Smoking status: Every Day     Packs/day: 0.50     Types: Cigarettes    Smokeless tobacco: Never   Substance and Sexual Activity    Alcohol use: Not Currently    Drug use: Not Currently    Sexual activity: Not Currently     Birth control/protection: Injection   Other Topics Concern    Not on file   Social History Narrative    Not on file     Social Determinants of Health     Financial Resource Strain: Not on file   Food Insecurity: Not on file   Transportation Needs: Not on file   Physical Activity: Not on file   Stress: Not on file   Social Connections: Not on file   Intimate Partner Violence: Not on file   Housing Stability: Not on file           REVIEW OF SYSTEMS     Constitutional: Negative for chills, fever and malaise/fatigue. HENT: Negative for congestion, hearing loss and sore throat. Respiratory: Negative for cough, sputum production, shortness of breath and wheezing. Cardiovascular: Negative for chest pain. Gastrointestinal: Negative for abdominal pain, constipation, diarrhea, nausea and vomiting. Genitourinary: Negative for dysuria, flank pain, hematuria and urgency. Neurological: Negative for dizziness, loss of consciousness, weakness and headaches. Psychiatric/Behavioral: Negative for depression.      PHYSICAL EXAM  /80 (BP 1 Location: Right upper arm, BP Patient Position: Sitting, BP Cuff Size: Large adult)   Wt 222 lb 9.6 oz (101 kg) BMI 34.86 kg/m²      Patient is a well-developed well-nourished female no apparent distress  She is alert and oriented x3  Head is normocephalic atraumatic pupils equal round react light accommodation  Neck is supple without adenopathy or thyromegaly  Heart is with regular rate and rhythm without murmurs rubs or gallops  Lungs are clear to auscultation and percussion bilaterally  Breasts are without masses bilaterally  Abdomen is soft nontender nondistended bowel sounds are present and active  Extremities are without clubbing cyanosis or edema  Pulses are full and symmetric bilaterally  Pelvic  External genitalia is with swelling of the left labia majora with a purulent lesion present on the surface. Peers to be an ulcerative lesion which is painful. Urethra is midline there are no apparent urethral lesions the bladder is within normal limits  Vagina is with normal rugae there is a yellow discharge present in the vaginal vault  Cervix is parous, there are no apparent cervical lesions, there is no cervical motion tenderness  Uterus is normal size and contours  Adnexa are without masses    ASSESSMENT and PLAN  Possible new onset herpetic outbreak  Plan: Pap performed, lidocaine gel prescribed, await culture results. No results found for this visit on 03/07/23. No orders of the defined types were placed in this encounter.

## 2023-03-09 ENCOUNTER — HOSPITAL ENCOUNTER (EMERGENCY)
Age: 28
Discharge: HOME | End: 2023-03-09
Attending: STUDENT IN AN ORGANIZED HEALTH CARE EDUCATION/TRAINING PROGRAM
Payer: MEDICAID

## 2023-03-09 VITALS — BODY MASS INDEX: 36.02 KG/M2 | TEMPERATURE: 98.2 F | WEIGHT: 230 LBS | OXYGEN SATURATION: 100 % | HEART RATE: 98 BPM

## 2023-03-09 DIAGNOSIS — B02.9 ACUTE PAIN ASSOCIATED WITH HERPES ZOSTER: Primary | ICD-10-CM

## 2023-03-09 LAB — HSV SPEC CULT: ABNORMAL

## 2023-03-09 PROCEDURE — 74011000250 HC RX REV CODE- 250: Performed by: STUDENT IN AN ORGANIZED HEALTH CARE EDUCATION/TRAINING PROGRAM

## 2023-03-09 PROCEDURE — 74011250637 HC RX REV CODE- 250/637: Performed by: STUDENT IN AN ORGANIZED HEALTH CARE EDUCATION/TRAINING PROGRAM

## 2023-03-09 PROCEDURE — 99283 EMERGENCY DEPT VISIT LOW MDM: CPT

## 2023-03-09 RX ORDER — GABAPENTIN 100 MG/1
200 CAPSULE ORAL
Qty: 18 CAPSULE | Refills: 0 | Status: SHIPPED | OUTPATIENT
Start: 2023-03-09 | End: 2023-03-12

## 2023-03-09 RX ORDER — GABAPENTIN 100 MG/1
200 CAPSULE ORAL ONCE
Status: COMPLETED | OUTPATIENT
Start: 2023-03-09 | End: 2023-03-09

## 2023-03-09 RX ADMIN — Medication 5 ML: at 05:21

## 2023-03-09 RX ADMIN — GABAPENTIN 200 MG: 100 CAPSULE ORAL at 05:20

## 2023-03-09 NOTE — ED NOTES
Discharge education included new prescription education and the need for follow-up with PCP. Patient verbalized understanding and denied further questions at this time. Patient ambulatory with steady gait at discharge.

## 2023-03-09 NOTE — ED PROVIDER NOTES
[id number]  EMERGENCY DEPARTMENT ENCOUNTER NOTE    Date: 3/9/2023  Patient Name: Wendell Babinski    History of Presenting Illness     Chief Complaint   Patient presents with    Urinary Pain    Vaginal Pain       History obtained from: Patient    HPI: Wendell Babinski, 32 y.o. female with a past medical history and outpatient medications as listed and reviewed below  presents for vaginal pain. She has been to the ER twice for vaginal pain and was treated for cellulitis, and was added on acyclovir by OB/GYN 2 days ago after suspicion of possible herpes infection. Straight biopsy is not resulted yet. She has been compliant with her medicines but comes in today for nontolerant of her pain. She has been using lidocaine cream and p.o. medicines without relief. She does not have any fevers or chills but does have burning on urination the burning is confined to the infected area.     Medical History   I reviewed the medical, surgical, family, and social history, as well as allergies:    PCP: Morgan Topete MD    Past Medical History:  Past Medical History:   Diagnosis Date    Anxiety     Depression     Diabetes ([address]     GERD (gastroesophageal reflux disease)     High cholesterol     Hypertension     Vaginal yeast infection     Vaginitis and vulvovaginitis      Past Surgical History:  Past Surgical History:   Procedure Laterality Date    HX  SECTION       Current Outpatient Medications:  Current Outpatient Medications   Medication Instructions    cefixime (SUPRAX) 400 mg, Oral, DAILY    cephALEXin (KEFLEX) 500 mg, Oral, 2 TIMES DAILY    clindamycin (CLEOCIN) 300 mg, Oral, 4 TIMES DAILY    doxycycline (VIBRA-TABS) 100 mg, Oral, 2 TIMES DAILY    gabapentin (NEURONTIN) 200 mg, Oral, 3 TIMES DAILY AS NEEDED    insulin glargine (LANTUS SOLOSTAR U-100 INSULIN) 20 Units, SubCUTAneous, DAILY    Lantus Solostar U-100 Insulin 20 Units, SubCUTAneous, EVERY BEDTIME    lidocaine 4 % gel Apply twice daily as needed to labia (outer vaginal area) for irritation and pain. Do not apply with the clotrimazole at the same time. lidocaine-aloe vera 0.5 % topical gel Topical, 3 TIMES DAILY    medroxyPROGESTERone (DEPO-PROVERA) 150 mg, IntraMUSCular, ONCE    metFORMIN (GLUCOPHAGE) 850 mg, Oral, 2 TIMES DAILY WITH MEALS    omeprazole (PRILOSEC) 40 mg, Oral, DAILY    venlafaxine (EFFEXOR) 25 mg, Oral, 3 TIMES DAILY      Family History:  No family history on file. Social History:  Social History     Tobacco Use    Smoking status: Every Day     Packs/day: 0.50     Types: Cigarettes    Smokeless tobacco: Never   Substance Use Topics    Alcohol use: Not Currently    Drug use: Not Currently     Allergies:  No Known Allergies    Review of Systems     Review of Systems  Negative: Positives and pertinent negatives as per HPI. All other systems were reviewed and are negative. Physical Exam & Vital Signs   Vital Signs - I reviewed the patient's vital signs. Patient Vitals for the past 12 hrs:   Temp Pulse BP SpO2   03/09/23 0418 98.2 °F (36.8 °C) 98 (!) (P) 145/85 100 %     Physical Exam:    GENERAL: not in apparent distress  HEENT:  * EOMI  * Head atraumatic  CV:  * warm extremities  * no cyanosis  PULMONARY: no respiratory distress, non labored breathing, no audible wheezing or stridor, no accessory muscle use  ABDOMEN: soft, moving in bed and pulls to seated position without grimace or pain  EXTREMITIES/BACK: moving four extremities without limitation  SKIN: no rashes or signs of trauma  NEURO:  * Speech clear  * GCS 15    Medical Decision Making     Patient is a 32 y.o. female presenting for vaginal pain due to possible herpes infection. Vitals reveal no significant abnormalities and physical exam reveals no significant abnormalities. Based on the history, physical exam, risk factors, and vital signs, differential includes: Herpes neuropathic pain.   She is requesting 10 of oxycodone which I am not comfortable prescribing as I just gave her oxycodone a few days ago. She requests 10 of oxycodone because she says that she has \"tolerance to oxycodone 5\". .    See ED Course and Reassessment for evaluation and discussion. Consultant Discussions/Recs: None  Records Reviewed: Nursing Notes  Social Determinants of health affecting management: None    ED Course & Reassessment     ED Course:          Reassessment:    Understanding was insured that at this time there is no evidence for a more malignant underlying process, but that early in the process of an illness, an emergency department workup can be falsely reassuring. Routine discharge counseling was given including the fact that any worsening, changing or persistent symptoms should prompt an immediate call or follow up with their primary physician or the emergency department. The importance of appropriate follow up was also discussed. More extensive discharge instructions were given in the patient's discharge paperwork. After completion of evaluation and discussion of results and diagnoses, all the questions were answered. If required, all follow up appointments and treatments were discussed and explained. Understanding was insured prior to discharge. Diagnosis     Clinical Impression:   1. Acute pain associated with herpes zoster        Final Disposition     Discharge: DISCHARGED FROM EMERGENCY DEPARTMENT    Patient will be discharged from the Emergency Department in stable condition. All of the diagnostic tests were reviewed and any questions were answered. Diagnosis, results, follow up if applicable, and return precautions were discussed.  I have also put together printed discharge instructions for them that include: 1) educational information regarding their diagnosis, 2) how to care for their diagnosis at home, as well a 3) list of reasons why they would want to return to the ED prior to their follow-up appointment, should their condition change. Any labs or imaging done in the ED will be either printed with the discharge paperwork or available through [address] DISCHARGE PLAN:  1. Current Discharge Medication List        CONTINUE these medications which have NOT CHANGED    Details   lidocaine-aloe vera 0.5 % topical gel Apply  to affected area three (3) times daily. Qty: 227 g, Refills: 0      cephALEXin (KEFLEX) 500 mg capsule Take 1 Capsule by mouth two (2) times a day for 10 days. Qty: 20 Capsule, Refills: 0      cefixime (SUPRAX) 400 mg capsule Take 1 Capsule by mouth daily for 7 days. Qty: 7 Capsule, Refills: 0      clindamycin (CLEOCIN) 300 mg capsule Take 1 Capsule by mouth four (4) times daily for 7 days. Qty: 28 Capsule, Refills: 0      doxycycline (VIBRA-TABS) 100 mg tablet Take 1 Tablet by mouth two (2) times a day for 10 days. Qty: 20 Tablet, Refills: 0      !! insulin glargine (Lantus Solostar U-100 Insulin) 100 unit/mL (3 mL) inpn 20 Units by SubCUTAneous route daily. Qty: 3 mL, Refills: 0      lidocaine 4 % gel Apply twice daily as needed to labia (outer vaginal area) for irritation and pain. Do not apply with the clotrimazole at the same time. Indications: skin irritation  Qty: 1 Each, Refills: 0      omeprazole (PRILOSEC) 40 mg capsule Take 40 mg by mouth daily. metFORMIN (GLUCOPHAGE) 850 mg tablet Take 850 mg by mouth two (2) times daily (with meals). venlafaxine (EFFEXOR) 75 mg tablet Take 25 mg by mouth three (3) times daily. !! insulin glargine (Lantus Solostar U-100 Insulin) 100 unit/mL (3 mL) inpn 20 Units by SubCUTAneous route nightly. medroxyPROGESTERone (DEPO-PROVERA) 150 mg/mL syrg 150 mg by IntraMUSCular route once. !! - Potential duplicate medications found. Please discuss with provider.         STOP taking these medications       oxyCODONE IR (ROXICODONE) 10 mg tab immediate release tablet Comments:   Reason for Stoppin.   Follow-up Information       Follow up With Specialties Details Why [facility] EMERGENCY DEPT Emergency Medicine Go to  If symptoms worsen [address] [id number] [phone]    Flor Silva MD Family Medicine Schedule an appointment as soon as possible for a visit in 1 week  [address]  [phone]            3. Return to ED if worse    4. Current Discharge Medication List        START taking these medications    Details   gabapentin (NEURONTIN) 100 mg capsule Take 2 Capsules by mouth three (3) times daily as needed for Pain for up to 3 days. Max Daily Amount: 600 mg. Qty: 18 Capsule, Refills: 0  Start date: 3/9/2023, End date: 3/12/2023    Associated Diagnoses: Acute pain associated with herpes zoster             Procedures, Critical Care, & Clinical Tools   Performed by: Phillip Haskins MD  Procedures     Not Applicable     Results, Consults, Medications     Consults:  None   Labs:  No results found for this or any previous visit (from the past 12 hour(s)). Radiologic Studies:  CT Results  (Last 48 hours)      None          CXR Results  (Last 48 hours)      None          Medications ordered:  Medications   lidocaine/EPINEPHrine/tetracaine (L.E.T) topical gel (has no administration in time range)   gabapentin (NEURONTIN) capsule 200 mg (has no administration in time range)       Documentation Comments   - I am the first and primary provider for this patient and am the primary provider of record. - Initial assessment performed. The patients presenting problems have been discussed, and the staff are in agreement with the care plan formulated and outlined with them. I have encouraged them to ask questions as they arise throughout their visit. - Available medical records, nursing notes, old EKGs, and EMS run sheets (if patient was EMS transported) were reviewed    Please note that this dictation was completed with [employer]end[employer], the REPP voice recognition software.   Quite often unanticipated grammatical, syntax, homophones, and other interpretive errors are inadvertently transcribed by the computer software. Please disregard these errors. Please excuse any errors that have escaped final proofreading.

## 2023-03-09 NOTE — DISCHARGE INSTRUCTIONS
Thank you! Thank you for allowing me to care for you in the emergency department. I sincerely hope that you are satisfied with your visit today. It is my goal to provide you with excellent care. Below you will find a list of your labs and imaging from your visit today if applicable. Should you have any questions regarding these results please do not hesitate to call the emergency department. Please review [employer] for a more detailed result list since the below list may not be comprehensive. Instructions on how to sign up to [employer] should be provided in this packet. Labs -   No results found for this or any previous visit (from the past 12 hour(s)). Radiologic Studies -   No orders to display     CT Results  (Last 48 hours)      None          CXR Results  (Last 48 hours)      None               If you feel that you have not received excellent quality care or timely care, please ask to speak to the nurse manager. Please choose us in the future for your continued health care needs. ------------------------------------------------------------------------------------------------------------  The exam and treatment you received in the Emergency Department were for an urgent problem and are not intended as complete care. It is important that you follow-up with a doctor, nurse practitioner, or physician assistant to:  (1) confirm your diagnosis,  (2) re-evaluation of changes in your illness and treatment, and  (3) for ongoing care. If your symptoms become worse or you do not improve as expected and you are unable to reach your usual health care provider, you should return to the Emergency Department. We are available 24 hours a day. Please take your discharge instructions with you when you go to your follow-up appointment. If a prescription has been provided, please have it filled as soon as possible to prevent a delay in treatment.  Read the entire medication instruction sheet provided to you by the pharmacy. If you have any questions or reservations about taking the medication due to side effects or interactions with other medications, please call your primary care physician or contact the ER to speak with the charge nurse. Make an appointment with your family doctor or the physician you were referred to for follow-up of this visit as instructed on your discharge paperwork, as this is a mandatory follow-up. Return to the ER if you are unable to be seen or if you are unable to be seen in a timely manner. If you have any problem arranging the follow-up visit, contact the Emergency Department immediately.

## 2023-03-09 NOTE — Clinical Note
[address] [facility] EMERGENCY DEPT  [facility] [place] [id number]  [phone]    Work/School Note    Date: 3/9/2023    To Whom It May concern:    Santhosh Eubanks was seen and treated today in the emergency room by the following provider(s):  Attending Provider: Azalea Kraus MD.      Santhosh Eubanks is excused from work/school on 3/9/2023 through 3/11/2023. She is medically clear to return to work/school on 3/12/2023.          Sincerely,          Kadeem Aldrich MD

## 2023-03-16 ENCOUNTER — TELEPHONE (OUTPATIENT)
Dept: OBGYN CLINIC | Age: 28
End: 2023-03-16

## 2023-03-16 DIAGNOSIS — A60.00 GENITAL HERPES SIMPLEX, UNSPECIFIED SITE: Primary | ICD-10-CM

## 2023-03-16 DIAGNOSIS — R10.2 PELVIC PAIN: Primary | ICD-10-CM

## 2023-03-16 RX ORDER — TRAMADOL HYDROCHLORIDE 50 MG/1
50 TABLET ORAL
Qty: 12 TABLET | Refills: 0 | Status: SHIPPED | OUTPATIENT
Start: 2023-03-16 | End: 2023-03-19

## 2023-03-16 RX ORDER — VALACYCLOVIR HYDROCHLORIDE 1 G/1
1000 TABLET, FILM COATED ORAL 2 TIMES DAILY
Qty: 20 TABLET | Refills: 0 | Status: SHIPPED | OUTPATIENT
Start: 2023-03-16 | End: 2023-03-26

## 2023-03-16 NOTE — TELEPHONE ENCOUNTER
Spoke with the patient and advised her she needs to be scheduled for a colposcopy due to her abnormal pap results (ASCUS + HPV). Patient has been scheduled on 03/27/23. She was also made aware she tested positive for HSV 2. Patient states she is miserable and has been to several ER facilities due to her symptoms. Per Dr Dipika Landrum, prescription for Valtrex and Tramadol submitted to the patient's pharmacy.

## 2023-03-21 ENCOUNTER — TELEPHONE (OUTPATIENT)
Dept: OBGYN CLINIC | Age: 28
End: 2023-03-21

## 2023-03-21 DIAGNOSIS — R52 PAIN: Primary | ICD-10-CM

## 2023-03-21 RX ORDER — IBUPROFEN 800 MG/1
800 TABLET ORAL
Qty: 30 TABLET | Refills: 0 | Status: SHIPPED | OUTPATIENT
Start: 2023-03-21

## 2023-03-21 NOTE — TELEPHONE ENCOUNTER
Returned a call to the patient and she is requesting a refill on Tramadol. States the Valtrex seems to be working but she is still experiencing a lot of pain. Per Dr Macias Me, refill on Tramadol denied but prescription for Ibuprofen 800 mg submitted.

## 2023-04-03 ENCOUNTER — OFFICE VISIT (OUTPATIENT)
Dept: OBGYN CLINIC | Age: 28
End: 2023-04-03
Payer: MEDICAID

## 2023-04-03 ENCOUNTER — TELEPHONE (OUTPATIENT)
Dept: OBGYN CLINIC | Age: 28
End: 2023-04-03

## 2023-04-03 ENCOUNTER — HOSPITAL ENCOUNTER (EMERGENCY)
Age: 28
Discharge: HOME | End: 2023-04-03
Attending: EMERGENCY MEDICINE
Payer: MEDICAID

## 2023-04-03 DIAGNOSIS — N89.8 VAGINAL IRRITATION: Primary | ICD-10-CM

## 2023-04-03 DIAGNOSIS — A60.04 HERPES SIMPLEX VULVOVAGINITIS: Primary | ICD-10-CM

## 2023-04-03 PROCEDURE — 74011250637 HC RX REV CODE- 250/637: Performed by: EMERGENCY MEDICINE

## 2023-04-03 PROCEDURE — 99284 EMERGENCY DEPT VISIT MOD MDM: CPT

## 2023-04-03 PROCEDURE — 99212 OFFICE O/P EST SF 10 MIN: CPT | Performed by: OBSTETRICS & GYNECOLOGY

## 2023-04-03 PROCEDURE — 74011250636 HC RX REV CODE- 250/636: Performed by: EMERGENCY MEDICINE

## 2023-04-03 PROCEDURE — 96372 THER/PROPH/DIAG INJ SC/IM: CPT

## 2023-04-03 RX ORDER — VALACYCLOVIR HYDROCHLORIDE 500 MG/1
500 TABLET, FILM COATED ORAL 2 TIMES DAILY
Qty: 120 TABLET | Refills: 6 | Status: SHIPPED | OUTPATIENT
Start: 2023-04-03

## 2023-04-03 RX ORDER — VALACYCLOVIR HYDROCHLORIDE 1 G/1
1000 TABLET, FILM COATED ORAL DAILY
Qty: 10 TABLET | Refills: 0 | Status: SHIPPED | OUTPATIENT
Start: 2023-04-03

## 2023-04-03 RX ORDER — TRAMADOL HYDROCHLORIDE 50 MG/1
50 TABLET ORAL
Qty: 12 TABLET | Refills: 0 | Status: SHIPPED | OUTPATIENT
Start: 2023-04-03 | End: 2023-04-06

## 2023-04-03 RX ORDER — FLUCONAZOLE 150 MG/1
150 TABLET ORAL
Qty: 1 TABLET | Refills: 0 | Status: SHIPPED | OUTPATIENT
Start: 2023-04-03 | End: 2023-04-03 | Stop reason: ALTCHOICE

## 2023-04-03 RX ORDER — FLUCONAZOLE 100 MG/1
150 TABLET ORAL
Status: COMPLETED | OUTPATIENT
Start: 2023-04-03 | End: 2023-04-03

## 2023-04-03 RX ORDER — KETOROLAC TROMETHAMINE 30 MG/ML
30 INJECTION, SOLUTION INTRAMUSCULAR; INTRAVENOUS
Status: COMPLETED
Start: 2023-04-03 | End: 2023-04-03

## 2023-04-03 RX ADMIN — KETOROLAC TROMETHAMINE 30 MG: 30 INJECTION, SOLUTION INTRAMUSCULAR at 02:44

## 2023-04-03 RX ADMIN — FLUCONAZOLE 150 MG: 100 TABLET ORAL at 03:38

## 2023-04-03 NOTE — TELEPHONE ENCOUNTER
Patient called stating she was seen at the hospital last night for another HSV outbreak and is requesting pain medication. Appointment scheduled for the patient to see Dr Mary Dacosta this afternoon.

## 2023-04-03 NOTE — ED NOTES
Writer chaperoned MD in room for exam, patient then asked writer to stay in room after provider left to talk and began making comments about how she gets better treatment everywhere but here at [address] [place] [address], she can go anywhere else like [place], [facility] or [facility] and they treat her better, she just wants some pain meds to make it through until her next appt. MD offered her pain medication here but informed her that he would not be giving her any more tramadol because sherry wrote in a note that he was not comfortable refilling that again. Patient was not happy with this and kept saying that this is the only place that does this to her and she should just go somewhere else.      Patient then moaned and groaned loudly every time someone would walk by the room and said she would be reporting everyone here

## 2023-04-03 NOTE — PROGRESS NOTES
Carole Ordonez is a 32 y.o. female who presents today for the following:  Chief Complaint   Patient presents with    Follow-up     Pt. Seen in er for herpes          HPI  Patient is a 24-year-old G2, P2 female who presents today with complaints of recurrent herpes outbreak. OB History          2    Para   2    Term   2            AB        Living   2         SAB        IAB        Ectopic        Molar        Multiple        Live Births   2                   Wt 207 lb (93.9 kg)   BMI 32.42 kg/m²    OBGyn Exam   Patient is a well-developed well-nourished female no apparent distress  She is alert and oriented x3  Pelvic exam  There is noted to be a significant recurrent herpes infection. No results found for this visit on 23. Assessment:  Recurrent genital herpes  Plan: Valacyclovir for treatment now followed by suppressive therapy    No orders of the defined types were placed in this encounter.

## 2023-04-04 NOTE — ED PROVIDER NOTES
[facility] EMERGENCY DEPT  EMERGENCY DEPARTMENT HISTORY AND PHYSICAL EXAM      Date: 4/3/2023  Patient Name: Loedan El  MRN: [id number]  [place]: 1995  Date of evaluation: 4/3/2023  Provider: Tee Cline DO     HISTORY OF PRESENT ILLNESS     Chief Complaint   Patient presents with    Abscess       History Provided By: Patient    HPI: Leodan El is a 32 y.o. female with past medical history significant for anxiety, depression, HSV presenting to the emergency department for evaluation of vaginal pain/irritation. Patient has been seen multiple times in this emergency department as well as by her OB/GYN. She has been prescribed oxycodone as well as tramadol by multiple providers. States that she has not been taking her acyclovir as prescribed. Requesting additional pain medication    PAST MEDICAL HISTORY   Past Medical History:  Past Medical History:   Diagnosis Date    Anxiety     Depression     Diabetes ([address]     GERD (gastroesophageal reflux disease)     High cholesterol     HSV-2 infection     Hypertension     Vaginal yeast infection     Vaginitis and vulvovaginitis        Past Surgical History:  Past Surgical History:   Procedure Laterality Date    HX  SECTION         Family History:  History reviewed. No pertinent family history. Social History:  Social History     Tobacco Use    Smoking status: Every Day     Packs/day: 0.50     Types: Cigarettes    Smokeless tobacco: Never   Substance Use Topics    Alcohol use: Not Currently    Drug use: Not Currently       Allergies:  No Known Allergies    PCP: MD TAM Kendrick   Review of Systems   Constitutional:  Negative for chills and fever. HENT:  Negative for congestion and sore throat. Eyes:  Negative for pain and visual disturbance. Respiratory:  Negative for cough and shortness of breath. Cardiovascular:  Negative for chest pain and palpitations.    Gastrointestinal:  Negative for constipation, diarrhea, nausea and vomiting. Genitourinary:  Positive for vaginal pain. Negative for dysuria and frequency. Musculoskeletal:  Negative for arthralgias and myalgias. Skin:  Positive for rash. Negative for color change. Neurological:  Negative for dizziness, weakness, light-headedness and headaches. Psychiatric/Behavioral:  Negative for dysphoric mood and sleep disturbance. PHYSICAL EXAM     ED Triage Vitals [04/03/23 0212]   ED Encounter Vitals Group      /86      Pulse (Heart Rate) (!) 115      Resp Rate 16      Temp 98.1 °F (36.7 °C)      Temp src       O2 Sat (%) 99 %      Weight 200 lb      Height 5' 7\"      Physical Exam  Constitutional:       Appearance: Normal appearance. HENT:      Head: Normocephalic and atraumatic. Right Ear: External ear normal.      Left Ear: External ear normal.      Nose: Nose normal.      Mouth/Throat:      Mouth: Mucous membranes are moist.   Eyes:      Extraocular Movements: Extraocular movements intact. Conjunctiva/sclera: Conjunctivae normal.   Cardiovascular:      Rate and Rhythm: Normal rate and regular rhythm. Pulses: Normal pulses. Heart sounds: Normal heart sounds. Pulmonary:      Effort: Pulmonary effort is normal.      Breath sounds: Normal breath sounds. Abdominal:      General: Abdomen is flat. There is no distension. Palpations: Abdomen is soft. Tenderness: There is no abdominal tenderness. Genitourinary:     Labia:         Right: Rash and tenderness (Area of induration without evidence of abscess, crepitance) present. Musculoskeletal:         General: Normal range of motion. Cervical back: Normal range of motion. Skin:     General: Skin is warm and dry. Capillary Refill: Capillary refill takes less than 2 seconds. Neurological:      General: No focal deficit present. Mental Status: She is alert and oriented to person, place, and time. Mental status is at baseline.    Psychiatric:         Mood and Affect: Mood normal.         Behavior: Behavior normal.       SCREENINGS        No data recorded      LAB, EKG AND DIAGNOSTIC RESULTS   Labs:  No results found for this or any previous visit (from the past 12 hour(s)). EKG: Initial EKG interpreted by me. Shows     Radiologic Studies:  Non-plain film images such as CT, Ultrasound and MRI are read by the radiologist. Plain radiographic images are visualized and preliminarily interpreted by the ED Physician with the following findings: Not applicable    Interpretation per the Radiologist below, if available at the time of this note:  No results found. PROCEDURES   Unless otherwise noted below, none. Procedures      CRITICAL CARE TIME   None    ED COURSE and DIFFERENTIAL DIAGNOSIS/MDM   CC/HPI Summary, DDx, ED Course, and Reassessment: 22-year-old female presenting to the emergency department for evaluation of vaginal pain/irritation. Patient believes that she is having active herpes outbreak. Has been evaluated multiple times in this department as well as by her OB/GYN. Reports noncompliance with her acyclovir. Is currently taking antibiotic for labial cellulitis. On exam, there is no evidence of necrotizing skin infection. Patient does have some ulcers. I informed patient that I could not write additional narcotic pain medication as she has been seen multiple times and her OB/GYN has declined to write further narcotic medications. She was offered Toradol shot in the emergency department. She is instructed to take her acyclovir as prescribed. Patient is requesting Diflucan prior to discharge secondary to antibiotic use.     Disposition Considerations (Tests not done, Shared Decision Making, Pt Expectation of Test or Treatment.):      Records Reviewed (source and summary of external notes): Prior medical records and Nursing notes    Vitals:    Vitals:    04/03/23 0212   BP: 134/86   Pulse: (!) 115   Resp: 16   Temp: 98.1 °F (36.7 °C)   SpO2: 99%   Weight: 90.7 kg (200 lb)   Height: 5' 7\" (1.702 m)             Patient was given the following medications:  Medications   ketorolac (TORADOL) injection 30 mg (30 mg IntraMUSCular Given 4/3/23 5367)   fluconazole (DIFLUCAN) tablet 150 mg (150 mg Oral Given 4/3/23 5136)       CONSULTS: (Who and What was discussed)  None     Social Determinants affecting Dx or Tx: None    FINAL IMPRESSION     1. Vaginal irritation          DISPOSITION/PLAN   Discharged    Discharge Note: The patient is stable for discharge home. The signs, symptoms, diagnosis, and discharge instructions have been discussed, understanding conveyed, and agreed upon. The patient is to follow up as recommended or return to ER should their symptoms worsen. PATIENT REFERRED TO:  Follow-up Information       Follow up With Specialties Details Why Contact Info    Miguelina Berry MD Obstetrics & Gynecology Schedule an appointment as soon as possible for a visit   [address] [place] [address]  [phone]                DISCHARGE MEDICATIONS:  Discharge Medication List as of 4/3/2023  3:19 AM        START taking these medications    Details   fluconazole (Diflucan) 150 mg tablet Take 1 Tablet by mouth now for 1 dose. FDA advises cautious prescribing of oral fluconazole in pregnancy. , Normal, Disp-1 Tablet, R-0           CONTINUE these medications which have NOT CHANGED    Details   ibuprofen (MOTRIN) 800 mg tablet Take 1 Tablet by mouth every eight (8) hours as needed for Pain., Normal, Disp-30 Tablet, R-0      lidocaine-aloe vera 0.5 % topical gel Apply  to affected area three (3) times daily. , Normal, Disp-227 g, R-0      !! insulin glargine (Lantus Solostar U-100 Insulin) 100 unit/mL (3 mL) inpn 20 Units by SubCUTAneous route daily. , Normal, Disp-3 mL, R-0      lidocaine 4 % gel Apply twice daily as needed to labia (outer vaginal area) for irritation and pain. Do not apply with the clotrimazole at the same time.   Indications: skin irritation, Normal, Disp-1 Each, R-0      omeprazole (PRILOSEC) 40 mg capsule Take 40 mg by mouth daily. , Historical Med      metFORMIN (GLUCOPHAGE) 850 mg tablet Take 850 mg by mouth two (2) times daily (with meals). , Historical Med      venlafaxine (EFFEXOR) 75 mg tablet Take 25 mg by mouth three (3) times daily. , Historical Med      !! insulin glargine (Lantus Solostar U-100 Insulin) 100 unit/mL (3 mL) inpn 20 Units by SubCUTAneous route nightly., Historical Med      medroxyPROGESTERone (DEPO-PROVERA) 150 mg/mL syrg 150 mg by IntraMUSCular route once., Historical Med       !! - Potential duplicate medications found. Please discuss with provider. DISCONTINUED MEDICATIONS:  Discharge Medication List as of 4/3/2023  3:19 AM          I am the Primary Clinician of Record: Alfredito Rowley DO (electronically signed)    (Please note that parts of this dictation were completed with voice recognition software. Quite often unanticipated grammatical, syntax, homophones, and other interpretive errors are inadvertently transcribed by the computer software. Please disregards these errors.  Please excuse any errors that have escaped final proofreading.)

## 2023-04-18 ENCOUNTER — OFFICE VISIT (OUTPATIENT)
Dept: OBGYN CLINIC | Age: 28
End: 2023-04-18

## 2023-04-18 VITALS — SYSTOLIC BLOOD PRESSURE: 118 MMHG | DIASTOLIC BLOOD PRESSURE: 69 MMHG | WEIGHT: 205 LBS | BODY MASS INDEX: 32.11 KG/M2

## 2023-04-18 DIAGNOSIS — R30.0 BURNING WITH URINATION: Primary | ICD-10-CM

## 2023-04-18 DIAGNOSIS — R87.810 ATYPICAL SQUAMOUS CELL CHANGES OF UNDETERMINED SIGNIFICANCE (ASCUS) ON CERVICAL CYTOLOGY WITH POSITIVE HIGH RISK HUMAN PAPILLOMA VIRUS (HPV): ICD-10-CM

## 2023-04-18 DIAGNOSIS — R87.610 ATYPICAL SQUAMOUS CELL CHANGES OF UNDETERMINED SIGNIFICANCE (ASCUS) ON CERVICAL CYTOLOGY WITH POSITIVE HIGH RISK HUMAN PAPILLOMA VIRUS (HPV): ICD-10-CM

## 2023-04-18 LAB
BILIRUB UR QL STRIP: NEGATIVE
GLUCOSE UR-MCNC: NORMAL MG/DL
KETONES P FAST UR STRIP-MCNC: NORMAL MG/DL
PH UR STRIP: 6 [PH] (ref 4.6–8)
PROT UR QL STRIP: NEGATIVE
SP GR UR STRIP: 1.02 (ref 1–1.03)
UA UROBILINOGEN AMB POC: NORMAL (ref 0.2–1)
URINALYSIS CLARITY POC: NORMAL
URINALYSIS COLOR POC: NORMAL
URINE BLOOD POC: NORMAL
URINE LEUKOCYTES POC: NEGATIVE
URINE NITRITES POC: NEGATIVE

## 2023-04-18 RX ORDER — IBUPROFEN 800 MG/1
800 TABLET ORAL
Qty: 30 TABLET | Refills: 1 | Status: SHIPPED | OUTPATIENT
Start: 2023-04-18

## 2023-04-18 NOTE — PROGRESS NOTES
Subjective:  Chief Complaints:        1. Recent abnormal pap smear. 2. Here for Colposcopy.:    HPI:         Mariela Johnson is a 32 y.o. female who came in today for colposcopy after abnormal pap smear findings. Patient is doing well today and has no complaints. ROS:  RESPIRATORY:     Negative for shortness of breath, chest pain, chest congestion, cough. CARDIOLOGY:    Negative for dizziness, chest pain, palpitations. FEMALE REPRODUCTIVE:    Negative for abnormal vaginal discharge, abnormal vaginal bleeding. UROLOGY:    Negative for difficulty urinating, voiding dysfunction, frequent urination, dysuria. Gyn History:    OB History:  OB History    Para Term  AB Living   2 2 2     2   SAB IAB Ectopic Molar Multiple Live Births             2      # Outcome Date GA Lbr Rosalio/2nd Weight Sex Delivery Anes PTL Lv   2 Term      CS-Unspec   KAEL   1 Term      CS-Unspec   KAEL       Current Outpatient Medications   Medication Sig    valACYclovir (VALTREX) 1 gram tablet Take 1 Tablet by mouth daily. valACYclovir (VALTREX) 500 mg tablet Take 1 Tablet by mouth two (2) times a day. ibuprofen (MOTRIN) 800 mg tablet Take 1 Tablet by mouth every eight (8) hours as needed for Pain.    lidocaine-aloe vera 0.5 % topical gel Apply  to affected area three (3) times daily. insulin glargine (Lantus Solostar U-100 Insulin) 100 unit/mL (3 mL) inpn 20 Units by SubCUTAneous route daily. lidocaine 4 % gel Apply twice daily as needed to labia (outer vaginal area) for irritation and pain. Do not apply with the clotrimazole at the same time. Indications: skin irritation (Patient not taking: Reported on 2023)    omeprazole (PRILOSEC) 40 mg capsule Take 40 mg by mouth daily. metFORMIN (GLUCOPHAGE) 850 mg tablet Take 850 mg by mouth two (2) times daily (with meals). venlafaxine (EFFEXOR) 75 mg tablet Take 25 mg by mouth three (3) times daily.     insulin glargine (Lantus Solostar U-100 Insulin) 100 unit/mL (3 mL) inpn 20 Units by SubCUTAneous route nightly. medroxyPROGESTERone (DEPO-PROVERA) 150 mg/mL syrg 150 mg by IntraMUSCular route once. No current facility-administered medications for this visit. Objective:  Physical Examination:  GENERAL:     General Appearance: well-appearing, well-developed, no acute distress. Hygiene: unremarkable. Mental Status:  alert and oriented. Mood/Affect:  pleasant. Speech: unremarkable. HEART:     Rhythm:  regular. Rate:  normal.  LUNGS:     Auscultation:  CTA bilaterally, no wheezing/rhonchi/rales. ABDOMEN:       Guarding:  none. Tenderness:  none. Masses:  none palpable. Inguinal nodes:  not enlarged. Ascites:  none. Bowel sounds:  normoactive. Distention:  none. Rebound tenderness:  none. RECTUM:     Anus:  no fissures, unremarkable. GENITOURINARY - FEMALE:     Vagina:  pink/moist mucosa, no gross evidence of lesions, no abnormal discharge. Cervix/cuff: normal appearance, no lesions/discharge/bleeding:  Colposcopy performed: see Procedure. External genitalia: normal.    Assessment:  The encounter diagnosis was Atypical squamous cell changes of undetermined significance (ASCUS) on cervical cytology with positive high risk human papilloma virus (HPV). Plan:  Pap smear results reviewed with pt. Colposcopy done today. Discussed importance of strict followup and to avoid smoking: Depending on severity, followup may be every 4-6 months or sooner if higher grade and may need immediate treatment. Goal is to prevent progression to Cervical Cancer. Discussed immunes system boosters such as adequate sleep; daily multivitamins, diet rich in wholesome nutritional foods and antioxidants and safe sex practices. Patient expressed understanding; All questions answered. Procedures:  Colposcopy:  Informed consent Risk, complications, benefits and alternatives discussed with patient, Consent obtained, 30 second time out taken.   Pregnancy status negative. Negative Pregnancy Test.  Colposcopy procedure Acetic Acid 4-6% solution applied to cervix, Endocervical Curreting was performed, Biopsy(ies) taken at 8 o'clock. ,Monsels paste applied to biopsy site(S),. Post Colposcopy Hemostasis was assured, Patient tolerated the procedure well, Instructed nothing in vagina for 4-6 days, Stressed importance of strict followup, Discussed importance of NOT SMOKING. Gardasil discussed in patients under 32years old, discussed that Gardasil (HPV Vaccine) can still offer protection against up to 70% of HPV types tht cause wqrts or cervical cancer. No orders of the defined types were placed in this encounter. Preventive:  If < 32years old, discussed HPV/Cervical CA prevention; Implications of Gardasil Vaccine and May prevent infection of HPV types that cause 70% of warts or cervical dysplasias, however, Yearly PAPs with HPV DNA testing is still necessary. This can be inquired at [facility] Dept.; Discussed need to quit smoking if smoker. Discussed importance of strict followup PAPs and colposcopies as recommended.     Follow Up: Biopsy result

## 2023-04-20 LAB
BACTERIA UR CULT: NORMAL
CPT BILLING CODE: ABNORMAL
DIAGNOSIS SYNOPSIS:: ABNORMAL
DX ICD CODE: ABNORMAL
DX ICD CODE: ABNORMAL
PATH REPORT.FINAL DX SPEC: ABNORMAL
PATH REPORT.GROSS SPEC: ABNORMAL
PATH REPORT.RELEVANT HX SPEC: ABNORMAL
PATH REPORT.SITE OF ORIGIN SPEC: ABNORMAL
PATHOLOGIST NAME: ABNORMAL
PAYMENT PROCEDURE: ABNORMAL

## 2023-04-21 LAB
CPT BILLING CODE: NORMAL
DIAGNOSIS SYNOPSIS:: NORMAL
DX ICD CODE: NORMAL
Lab: NORMAL
PATH REPORT.FINAL DX SPEC: NORMAL
PATH REPORT.GROSS SPEC: NORMAL
PATH REPORT.RELEVANT HX SPEC: NORMAL
PATH REPORT.SITE OF ORIGIN SPEC: NORMAL
PATHOLOGIST NAME: NORMAL
PAYMENT PROCEDURE: NORMAL

## 2023-05-03 ENCOUNTER — OFFICE VISIT (OUTPATIENT)
Dept: OBGYN CLINIC | Age: 28
End: 2023-05-03

## 2023-05-03 VITALS — WEIGHT: 208 LBS | BODY MASS INDEX: 32.58 KG/M2

## 2023-05-03 DIAGNOSIS — R87.613 HIGH GRADE SQUAMOUS INTRAEPITHELIAL LESION OF CERVIX: Primary | ICD-10-CM

## 2023-05-09 ENCOUNTER — HOSPITAL ENCOUNTER (OUTPATIENT)
Facility: HOSPITAL | Age: 28
Discharge: HOME | End: 2023-05-12
Payer: MEDICAID

## 2023-05-09 ENCOUNTER — TELEPHONE (OUTPATIENT)
Age: 28
End: 2023-05-09

## 2023-05-09 VITALS
HEIGHT: 67 IN | BODY MASS INDEX: 33.25 KG/M2 | HEART RATE: 82 BPM | WEIGHT: 211.86 LBS | RESPIRATION RATE: 16 BRPM | DIASTOLIC BLOOD PRESSURE: 75 MMHG | TEMPERATURE: 98.6 F | SYSTOLIC BLOOD PRESSURE: 110 MMHG | OXYGEN SATURATION: 100 %

## 2023-05-09 LAB
HCT VFR BLD AUTO: 41.7 % (ref 35–47)
HGB BLD-MCNC: 14.2 G/DL (ref 11.5–16)

## 2023-05-09 PROCEDURE — 85018 HEMOGLOBIN: CPT

## 2023-05-09 PROCEDURE — 93005 ELECTROCARDIOGRAM TRACING: CPT | Performed by: OBSTETRICS & GYNECOLOGY

## 2023-05-09 PROCEDURE — 36415 COLL VENOUS BLD VENIPUNCTURE: CPT

## 2023-05-09 PROCEDURE — 83036 HEMOGLOBIN GLYCOSYLATED A1C: CPT

## 2023-05-09 PROCEDURE — 85014 HEMATOCRIT: CPT

## 2023-05-09 RX ORDER — TRAMADOL HYDROCHLORIDE 50 MG/1
TABLET ORAL
Qty: 12 TABLET | OUTPATIENT
Start: 2023-05-09

## 2023-05-09 NOTE — PERIOP NOTE
26 Dr. Henderson Schirmer office called, spoke with Jony Funk CMA, made aware patient takes lantus insulin 20units at bedtime and made aware pt stated she ran out of tramadol for her previous outbreak of herpes pain and is requesting a prescription refill, also made aware pt stated she takes diclofenac for pain and will not take anymore prior to surgery. Brent Mcmahon spoke with Dr. Landeros, stated he said for patient to take her lantus insulin as usual the night before her surgery and stated he can not prescribe tramadol prior to surgery, stated patient can go to ER or her primary care doctor if having too much pain, patient instructed and verbalized understanding.

## 2023-05-09 NOTE — TELEPHONE ENCOUNTER
Patient called stating she is experiencing abdominal pain/cramping and has tried the Ibuprofen and it has not helped. States she was seen at an urgent care but name not given when asked. She states she is scheduled for a surgical procedure outpatient on 05/11/23. Questioned if the patient has had an ultrasound and she denies. Advised her I would call her back once I have spoken with Dr Jenifer Ly. Per Dr Jenifer Ly, patient advised he will order an ultrasound after she has the procedure. Patient upset and states she will have to find another physician once she has the procedure.

## 2023-05-10 ENCOUNTER — TELEPHONE (OUTPATIENT)
Age: 28
End: 2023-05-10

## 2023-05-10 LAB
EKG ATRIAL RATE: 71 BPM
EKG DIAGNOSIS: NORMAL
EKG P AXIS: 40 DEGREES
EKG P-R INTERVAL: 174 MS
EKG Q-T INTERVAL: 400 MS
EKG QRS DURATION: 82 MS
EKG QTC CALCULATION (BAZETT): 434 MS
EKG R AXIS: 43 DEGREES
EKG T AXIS: 22 DEGREES
EKG VENTRICULAR RATE: 71 BPM
EST. AVERAGE GLUCOSE BLD GHB EST-MCNC: 318 MG/DL
HBA1C MFR BLD: 12.7 % (ref 4–5.6)

## 2023-05-10 NOTE — PERIOP NOTE
1500 Noted hgb A1C 12.7 result was reviewed by Dr. Connie Juan at [phone] today. Dr. Reginald Petersen office called, spoke with [facility], N, she stated no orders received from Dr. Connie Juan re: lab result. Juliet attempted to call Dr. Connie Juan to confirm without answer. 0 Dr. Cristin Davies called, made aware of patient's history and hgb A1C 12.7. Dr. Cristin Davies requested for PCP to be notified and for patient to be advised to see PCP as soon as possible for better diabetes management and he stated he is ok to proceed with surgery as long as Dr. Connie Juan is ok with result, made aware that Dr. Connie Juan had reviewed result without any new order as noted above, stated can confirm with Dr. Connie Juan on day of surgery. [facility] dept called, chart flagged to confirm with Dr. Connie Juan re:hgb A1C and proceeding with surgery. 1540 Patient called re: hgb A1C , made aware she needs a follow up chacha't with PCP as advised by Dr. Cristin Davies as noted above, patient requested for an chacha't be made for her. 65 Dr. Deric Gonzales office called, message taken for Dr. Cesar Iqbal re: hgb A1C 12.7, office staff stated he will review result, chacha't made for 1st available chacha't on 5/26/2023 at 0945. Patient called and made aware and she verbalized understanding.

## 2023-05-10 NOTE — TELEPHONE ENCOUNTER
Received a call from Judie in pre-admission testing stating the patient's hemoglobin A1C is 12.7 and she saw where Dr Lindy Lala reviewed it today. She is just making sure her would like to proceed with the patient's LEEP tomorrow. Advised her he is not in the office this afternoon but I would try to contact him. Unable to reach him and advised Judie of this. She states she will also try to contact anesthesia.

## 2023-05-11 ENCOUNTER — ANESTHESIA EVENT (OUTPATIENT)
Facility: HOSPITAL | Age: 28
End: 2023-05-11
Payer: MEDICAID

## 2023-05-11 ENCOUNTER — ANESTHESIA (OUTPATIENT)
Facility: HOSPITAL | Age: 28
End: 2023-05-11
Payer: MEDICAID

## 2023-05-11 ENCOUNTER — HOSPITAL ENCOUNTER (OUTPATIENT)
Facility: HOSPITAL | Age: 28
Discharge: HOME | End: 2023-05-11
Attending: OBSTETRICS & GYNECOLOGY | Admitting: OBSTETRICS & GYNECOLOGY
Payer: MEDICAID

## 2023-05-11 VITALS
RESPIRATION RATE: 16 BRPM | HEART RATE: 69 BPM | SYSTOLIC BLOOD PRESSURE: 111 MMHG | TEMPERATURE: 97.6 F | OXYGEN SATURATION: 98 % | DIASTOLIC BLOOD PRESSURE: 65 MMHG

## 2023-05-11 DIAGNOSIS — R87.610 PAPANICOLAOU SMEAR OF CERVIX WITH ATYPICAL SQUAMOUS CELLS OF UNDETERMINED SIGNIFICANCE (ASC-US): ICD-10-CM

## 2023-05-11 PROBLEM — D06.9 HIGH GRADE CERVICAL GLANDULAR INTRAEPITHELIAL NEOPLASIA: Status: ACTIVE | Noted: 2023-05-11

## 2023-05-11 LAB
GLUCOSE BLD STRIP.AUTO-MCNC: 153 MG/DL (ref 65–100)
GLUCOSE BLD STRIP.AUTO-MCNC: 178 MG/DL (ref 65–100)
GLUCOSE BLD STRIP.AUTO-MCNC: 232 MG/DL (ref 65–100)
HCG UR QL: NEGATIVE
PERFORMED BY:: ABNORMAL

## 2023-05-11 PROCEDURE — 88342 IMHCHEM/IMCYTCHM 1ST ANTB: CPT

## 2023-05-11 PROCEDURE — 3700000001 HC ADD 15 MINUTES (ANESTHESIA): Performed by: OBSTETRICS & GYNECOLOGY

## 2023-05-11 PROCEDURE — 7100000000 HC PACU RECOVERY - FIRST 15 MIN: Performed by: OBSTETRICS & GYNECOLOGY

## 2023-05-11 PROCEDURE — 3600000002 HC SURGERY LEVEL 2 BASE: Performed by: OBSTETRICS & GYNECOLOGY

## 2023-05-11 PROCEDURE — 3700000000 HC ANESTHESIA ATTENDED CARE: Performed by: OBSTETRICS & GYNECOLOGY

## 2023-05-11 PROCEDURE — 3600000012 HC SURGERY LEVEL 2 ADDTL 15MIN: Performed by: OBSTETRICS & GYNECOLOGY

## 2023-05-11 PROCEDURE — 6370000000 HC RX 637 (ALT 250 FOR IP): Performed by: OBSTETRICS & GYNECOLOGY

## 2023-05-11 PROCEDURE — 88307 TISSUE EXAM BY PATHOLOGIST: CPT

## 2023-05-11 PROCEDURE — 2500000003 HC RX 250 WO HCPCS: Performed by: NURSE ANESTHETIST, CERTIFIED REGISTERED

## 2023-05-11 PROCEDURE — 6360000002 HC RX W HCPCS: Performed by: ANESTHESIOLOGY

## 2023-05-11 PROCEDURE — 7100000011 HC PHASE II RECOVERY - ADDTL 15 MIN: Performed by: OBSTETRICS & GYNECOLOGY

## 2023-05-11 PROCEDURE — 7100000001 HC PACU RECOVERY - ADDTL 15 MIN: Performed by: OBSTETRICS & GYNECOLOGY

## 2023-05-11 PROCEDURE — 2500000003 HC RX 250 WO HCPCS: Performed by: OBSTETRICS & GYNECOLOGY

## 2023-05-11 PROCEDURE — 6360000002 HC RX W HCPCS: Performed by: NURSE ANESTHETIST, CERTIFIED REGISTERED

## 2023-05-11 PROCEDURE — 2709999900 HC NON-CHARGEABLE SUPPLY: Performed by: OBSTETRICS & GYNECOLOGY

## 2023-05-11 PROCEDURE — 81025 URINE PREGNANCY TEST: CPT

## 2023-05-11 PROCEDURE — 2580000003 HC RX 258: Performed by: OBSTETRICS & GYNECOLOGY

## 2023-05-11 PROCEDURE — 7100000010 HC PHASE II RECOVERY - FIRST 15 MIN: Performed by: OBSTETRICS & GYNECOLOGY

## 2023-05-11 PROCEDURE — 82962 GLUCOSE BLOOD TEST: CPT

## 2023-05-11 PROCEDURE — 2580000003 HC RX 258: Performed by: NURSE ANESTHETIST, CERTIFIED REGISTERED

## 2023-05-11 RX ORDER — MIDAZOLAM HYDROCHLORIDE 1 MG/ML
INJECTION INTRAMUSCULAR; INTRAVENOUS PRN
Status: DISCONTINUED | OUTPATIENT
Start: 2023-05-11 | End: 2023-05-11 | Stop reason: SDUPTHER

## 2023-05-11 RX ORDER — CEFAZOLIN SODIUM 1 G/3ML
INJECTION, POWDER, FOR SOLUTION INTRAMUSCULAR; INTRAVENOUS PRN
Status: DISCONTINUED | OUTPATIENT
Start: 2023-05-11 | End: 2023-05-11 | Stop reason: SDUPTHER

## 2023-05-11 RX ORDER — DEXAMETHASONE SODIUM PHOSPHATE 4 MG/ML
INJECTION, SOLUTION INTRA-ARTICULAR; INTRALESIONAL; INTRAMUSCULAR; INTRAVENOUS; SOFT TISSUE PRN
Status: DISCONTINUED | OUTPATIENT
Start: 2023-05-11 | End: 2023-05-11 | Stop reason: SDUPTHER

## 2023-05-11 RX ORDER — LORAZEPAM 2 MG/ML
0.5 INJECTION INTRAMUSCULAR
Status: DISCONTINUED | OUTPATIENT
Start: 2023-05-11 | End: 2023-05-11 | Stop reason: HOSPADM

## 2023-05-11 RX ORDER — METOCLOPRAMIDE HYDROCHLORIDE 5 MG/ML
10 INJECTION INTRAMUSCULAR; INTRAVENOUS
Status: DISCONTINUED | OUTPATIENT
Start: 2023-05-11 | End: 2023-05-11 | Stop reason: HOSPADM

## 2023-05-11 RX ORDER — OXYCODONE HYDROCHLORIDE 5 MG/1
5 TABLET ORAL PRN
Status: DISCONTINUED | OUTPATIENT
Start: 2023-05-11 | End: 2023-05-11 | Stop reason: HOSPADM

## 2023-05-11 RX ORDER — IBUPROFEN 400 MG/1
800 TABLET ORAL EVERY 6 HOURS PRN
Status: DISCONTINUED | OUTPATIENT
Start: 2023-05-11 | End: 2023-05-11 | Stop reason: HOSPADM

## 2023-05-11 RX ORDER — ONDANSETRON 2 MG/ML
4 INJECTION INTRAMUSCULAR; INTRAVENOUS EVERY 6 HOURS PRN
Status: DISCONTINUED | OUTPATIENT
Start: 2023-05-11 | End: 2023-05-11 | Stop reason: HOSPADM

## 2023-05-11 RX ORDER — LIDOCAINE HYDROCHLORIDE AND EPINEPHRINE 10; 10 MG/ML; UG/ML
INJECTION, SOLUTION INFILTRATION; PERINEURAL PRN
Status: DISCONTINUED | OUTPATIENT
Start: 2023-05-11 | End: 2023-05-11 | Stop reason: ALTCHOICE

## 2023-05-11 RX ORDER — HYDRALAZINE HYDROCHLORIDE 20 MG/ML
10 INJECTION INTRAMUSCULAR; INTRAVENOUS
Status: DISCONTINUED | OUTPATIENT
Start: 2023-05-11 | End: 2023-05-11 | Stop reason: HOSPADM

## 2023-05-11 RX ORDER — INSULIN LISPRO 100 [IU]/ML
8 INJECTION, SOLUTION INTRAVENOUS; SUBCUTANEOUS
COMMUNITY

## 2023-05-11 RX ORDER — MEPERIDINE HYDROCHLORIDE 25 MG/ML
12.5 INJECTION INTRAMUSCULAR; INTRAVENOUS; SUBCUTANEOUS EVERY 5 MIN PRN
Status: DISCONTINUED | OUTPATIENT
Start: 2023-05-11 | End: 2023-05-11 | Stop reason: HOSPADM

## 2023-05-11 RX ORDER — OXYCODONE HYDROCHLORIDE 5 MG/1
10 TABLET ORAL PRN
Status: DISCONTINUED | OUTPATIENT
Start: 2023-05-11 | End: 2023-05-11 | Stop reason: HOSPADM

## 2023-05-11 RX ORDER — ONDANSETRON 4 MG/1
4 TABLET, ORALLY DISINTEGRATING ORAL EVERY 8 HOURS PRN
Status: DISCONTINUED | OUTPATIENT
Start: 2023-05-11 | End: 2023-05-11 | Stop reason: HOSPADM

## 2023-05-11 RX ORDER — SODIUM CHLORIDE 0.9 % (FLUSH) 0.9 %
5-40 SYRINGE (ML) INJECTION PRN
Status: DISCONTINUED | OUTPATIENT
Start: 2023-05-11 | End: 2023-05-11 | Stop reason: HOSPADM

## 2023-05-11 RX ORDER — ACETAMINOPHEN 500 MG
1000 TABLET ORAL EVERY 8 HOURS PRN
Status: DISCONTINUED | OUTPATIENT
Start: 2023-05-11 | End: 2023-05-11 | Stop reason: HOSPADM

## 2023-05-11 RX ORDER — ONDANSETRON 2 MG/ML
INJECTION INTRAMUSCULAR; INTRAVENOUS PRN
Status: DISCONTINUED | OUTPATIENT
Start: 2023-05-11 | End: 2023-05-11 | Stop reason: SDUPTHER

## 2023-05-11 RX ORDER — HYDROMORPHONE HYDROCHLORIDE 1 MG/ML
0.5 INJECTION, SOLUTION INTRAMUSCULAR; INTRAVENOUS; SUBCUTANEOUS EVERY 5 MIN PRN
Status: DISCONTINUED | OUTPATIENT
Start: 2023-05-11 | End: 2023-05-11 | Stop reason: HOSPADM

## 2023-05-11 RX ORDER — LABETALOL HYDROCHLORIDE 5 MG/ML
10 INJECTION, SOLUTION INTRAVENOUS
Status: DISCONTINUED | OUTPATIENT
Start: 2023-05-11 | End: 2023-05-11 | Stop reason: HOSPADM

## 2023-05-11 RX ORDER — IPRATROPIUM BROMIDE AND ALBUTEROL SULFATE 2.5; .5 MG/3ML; MG/3ML
1 SOLUTION RESPIRATORY (INHALATION)
Status: DISCONTINUED | OUTPATIENT
Start: 2023-05-11 | End: 2023-05-11 | Stop reason: HOSPADM

## 2023-05-11 RX ORDER — LIDOCAINE HYDROCHLORIDE 20 MG/ML
INJECTION, SOLUTION EPIDURAL; INFILTRATION; INTRACAUDAL; PERINEURAL PRN
Status: DISCONTINUED | OUTPATIENT
Start: 2023-05-11 | End: 2023-05-11 | Stop reason: SDUPTHER

## 2023-05-11 RX ORDER — SODIUM CHLORIDE, SODIUM LACTATE, POTASSIUM CHLORIDE, CALCIUM CHLORIDE 600; 310; 30; 20 MG/100ML; MG/100ML; MG/100ML; MG/100ML
INJECTION, SOLUTION INTRAVENOUS CONTINUOUS
Status: DISCONTINUED | OUTPATIENT
Start: 2023-05-11 | End: 2023-05-11 | Stop reason: SDUPTHER

## 2023-05-11 RX ORDER — IODINE SOLUTION STRONG 5% (LUGOL'S) 5 %
SOLUTION ORAL PRN
Status: DISCONTINUED | OUTPATIENT
Start: 2023-05-11 | End: 2023-05-11 | Stop reason: ALTCHOICE

## 2023-05-11 RX ORDER — SODIUM CHLORIDE 9 MG/ML
INJECTION, SOLUTION INTRAVENOUS PRN
Status: DISCONTINUED | OUTPATIENT
Start: 2023-05-11 | End: 2023-05-11 | Stop reason: HOSPADM

## 2023-05-11 RX ORDER — SODIUM CHLORIDE 0.9 % (FLUSH) 0.9 %
5-40 SYRINGE (ML) INJECTION EVERY 12 HOURS SCHEDULED
Status: DISCONTINUED | OUTPATIENT
Start: 2023-05-11 | End: 2023-05-11 | Stop reason: HOSPADM

## 2023-05-11 RX ORDER — SODIUM CHLORIDE, SODIUM LACTATE, POTASSIUM CHLORIDE, CALCIUM CHLORIDE 600; 310; 30; 20 MG/100ML; MG/100ML; MG/100ML; MG/100ML
INJECTION, SOLUTION INTRAVENOUS CONTINUOUS
Status: DISCONTINUED | OUTPATIENT
Start: 2023-05-11 | End: 2023-05-11 | Stop reason: HOSPADM

## 2023-05-11 RX ORDER — PROPOFOL 10 MG/ML
INJECTION, EMULSION INTRAVENOUS PRN
Status: DISCONTINUED | OUTPATIENT
Start: 2023-05-11 | End: 2023-05-11 | Stop reason: SDUPTHER

## 2023-05-11 RX ORDER — FENTANYL CITRATE 50 UG/ML
50 INJECTION, SOLUTION INTRAMUSCULAR; INTRAVENOUS EVERY 5 MIN PRN
Status: DISCONTINUED | OUTPATIENT
Start: 2023-05-11 | End: 2023-05-11 | Stop reason: HOSPADM

## 2023-05-11 RX ORDER — IBUPROFEN 800 MG/1
800 TABLET ORAL EVERY 6 HOURS PRN
Qty: 30 TABLET | Refills: 0 | Status: SHIPPED | OUTPATIENT
Start: 2023-05-11

## 2023-05-11 RX ORDER — MORPHINE SULFATE 0.5 MG/ML
1 INJECTION, SOLUTION EPIDURAL; INTRATHECAL; INTRAVENOUS
Status: COMPLETED | OUTPATIENT
Start: 2023-05-11 | End: 2023-05-11

## 2023-05-11 RX ORDER — DIPHENHYDRAMINE HYDROCHLORIDE 50 MG/ML
12.5 INJECTION INTRAMUSCULAR; INTRAVENOUS
Status: DISCONTINUED | OUTPATIENT
Start: 2023-05-11 | End: 2023-05-11 | Stop reason: HOSPADM

## 2023-05-11 RX ORDER — ONDANSETRON 2 MG/ML
4 INJECTION INTRAMUSCULAR; INTRAVENOUS
Status: DISCONTINUED | OUTPATIENT
Start: 2023-05-11 | End: 2023-05-11 | Stop reason: HOSPADM

## 2023-05-11 RX ORDER — FENTANYL CITRATE 50 UG/ML
INJECTION, SOLUTION INTRAMUSCULAR; INTRAVENOUS PRN
Status: DISCONTINUED | OUTPATIENT
Start: 2023-05-11 | End: 2023-05-11 | Stop reason: SDUPTHER

## 2023-05-11 RX ORDER — SODIUM CHLORIDE, SODIUM LACTATE, POTASSIUM CHLORIDE, CALCIUM CHLORIDE 600; 310; 30; 20 MG/100ML; MG/100ML; MG/100ML; MG/100ML
INJECTION, SOLUTION INTRAVENOUS CONTINUOUS PRN
Status: DISCONTINUED | OUTPATIENT
Start: 2023-05-11 | End: 2023-05-11 | Stop reason: SDUPTHER

## 2023-05-11 RX ORDER — OXYCODONE HYDROCHLORIDE AND ACETAMINOPHEN 5; 325 MG/1; MG/1
1 TABLET ORAL EVERY 6 HOURS PRN
Status: DISCONTINUED | OUTPATIENT
Start: 2023-05-11 | End: 2023-05-11 | Stop reason: HOSPADM

## 2023-05-11 RX ADMIN — PROPOFOL 50 MG: 10 INJECTION, EMULSION INTRAVENOUS at 15:56

## 2023-05-11 RX ADMIN — MORPHINE SULFATE 1 MG: 0.5 INJECTION, SOLUTION EPIDURAL; INTRATHECAL; INTRAVENOUS at 13:20

## 2023-05-11 RX ADMIN — FENTANYL CITRATE 50 MCG: 50 INJECTION, SOLUTION INTRAMUSCULAR; INTRAVENOUS at 16:58

## 2023-05-11 RX ADMIN — OXYCODONE HYDROCHLORIDE AND ACETAMINOPHEN 1 TABLET: 5; 325 TABLET ORAL at 17:18

## 2023-05-11 RX ADMIN — CEFAZOLIN SODIUM 2 G: 1 INJECTION, POWDER, FOR SOLUTION INTRAMUSCULAR; INTRAVENOUS at 15:51

## 2023-05-11 RX ADMIN — PROPOFOL 50 MG: 10 INJECTION, EMULSION INTRAVENOUS at 16:09

## 2023-05-11 RX ADMIN — PROPOFOL 50 MG: 10 INJECTION, EMULSION INTRAVENOUS at 16:05

## 2023-05-11 RX ADMIN — FENTANYL CITRATE 50 MCG: 50 INJECTION, SOLUTION INTRAMUSCULAR; INTRAVENOUS at 15:56

## 2023-05-11 RX ADMIN — LIDOCAINE HYDROCHLORIDE 100 MG: 20 INJECTION, SOLUTION EPIDURAL; INFILTRATION; INTRACAUDAL; PERINEURAL at 15:56

## 2023-05-11 RX ADMIN — PROPOFOL 50 MG: 10 INJECTION, EMULSION INTRAVENOUS at 16:13

## 2023-05-11 RX ADMIN — PROPOFOL 50 MG: 10 INJECTION, EMULSION INTRAVENOUS at 16:01

## 2023-05-11 RX ADMIN — MIDAZOLAM HYDROCHLORIDE 2 MG: 2 INJECTION, SOLUTION INTRAMUSCULAR; INTRAVENOUS at 15:50

## 2023-05-11 RX ADMIN — SODIUM CHLORIDE, POTASSIUM CHLORIDE, SODIUM LACTATE AND CALCIUM CHLORIDE: 600; 310; 30; 20 INJECTION, SOLUTION INTRAVENOUS at 15:50

## 2023-05-11 RX ADMIN — PROPOFOL 50 MG: 10 INJECTION, EMULSION INTRAVENOUS at 15:59

## 2023-05-11 RX ADMIN — HYDROMORPHONE HYDROCHLORIDE 0.5 MG: 1 INJECTION, SOLUTION INTRAMUSCULAR; INTRAVENOUS; SUBCUTANEOUS at 16:37

## 2023-05-11 RX ADMIN — DEXAMETHASONE SODIUM PHOSPHATE 4 MG: 4 INJECTION INTRA-ARTICULAR; INTRALESIONAL; INTRAMUSCULAR; INTRAVENOUS; SOFT TISSUE at 15:59

## 2023-05-11 RX ADMIN — FENTANYL CITRATE 50 MCG: 50 INJECTION, SOLUTION INTRAMUSCULAR; INTRAVENOUS at 15:50

## 2023-05-11 RX ADMIN — ONDANSETRON 4 MG: 2 INJECTION INTRAMUSCULAR; INTRAVENOUS at 15:59

## 2023-05-11 RX ADMIN — SODIUM CHLORIDE, POTASSIUM CHLORIDE, SODIUM LACTATE AND CALCIUM CHLORIDE: 600; 310; 30; 20 INJECTION, SOLUTION INTRAVENOUS at 12:54

## 2023-05-11 RX ADMIN — HYDROMORPHONE HYDROCHLORIDE 0.5 MG: 1 INJECTION, SOLUTION INTRAMUSCULAR; INTRAVENOUS; SUBCUTANEOUS at 16:42

## 2023-05-11 ASSESSMENT — PAIN SCALES - GENERAL
PAINLEVEL_OUTOF10: 2
PAINLEVEL_OUTOF10: 7
PAINLEVEL_OUTOF10: 5
PAINLEVEL_OUTOF10: 5
PAINLEVEL_OUTOF10: 10
PAINLEVEL_OUTOF10: 9
PAINLEVEL_OUTOF10: 9
PAINLEVEL_OUTOF10: 10
PAINLEVEL_OUTOF10: 2
PAINLEVEL_OUTOF10: 7

## 2023-05-11 ASSESSMENT — PAIN DESCRIPTION - LOCATION
LOCATION: VAGINA
LOCATION: ABDOMEN
LOCATION: VAGINA
LOCATION: ABDOMEN
LOCATION: VAGINA
LOCATION: ABDOMEN
LOCATION: VAGINA

## 2023-05-11 ASSESSMENT — PAIN DESCRIPTION - DESCRIPTORS
DESCRIPTORS: CRAMPING
DESCRIPTORS: CRAMPING;STABBING
DESCRIPTORS: CRAMPING
DESCRIPTORS: CRAMPING
DESCRIPTORS: CRAMPING;STABBING
DESCRIPTORS: CRAMPING;STABBING
DESCRIPTORS: CRAMPING
DESCRIPTORS: CRAMPING;STABBING

## 2023-05-11 ASSESSMENT — PAIN DESCRIPTION - ORIENTATION: ORIENTATION: LOWER

## 2023-05-11 ASSESSMENT — PAIN DESCRIPTION - FREQUENCY: FREQUENCY: INTERMITTENT

## 2023-05-11 ASSESSMENT — LIFESTYLE VARIABLES: SMOKING_STATUS: 1

## 2023-05-11 ASSESSMENT — PAIN - FUNCTIONAL ASSESSMENT: PAIN_FUNCTIONAL_ASSESSMENT: 0-10

## 2023-05-11 NOTE — ANESTHESIA PRE PROCEDURE
calculate BMI.    CBC:   Lab Results   Component Value Date/Time    HGB 14.2 05/09/2023 02:25 PM    HCT 41.7 05/09/2023 02:25 PM       CMP: No results found for: NA, K, CL, CO2, BUN, CREATININE, GFRAA, AGRATIO, LABGLOM, GLUCOSE, GLU, PROT, CALCIUM, BILITOT, ALKPHOS, AST, ALT    POC Tests:   Recent Labs     05/11/23  1247   POCGLU 232*       Coags: No results found for: PROTIME, INR, APTT    HCG (If Applicable):   Lab Results   Component Value Date    PREGTESTUR Negative 05/11/2023        ABGs: No results found for: PHART, PO2ART, WMO5DCL, FBU9TDK, BEART, J1KRZNMQ     Type & Screen (If Applicable):  No results found for: LABABO, LABRH    Drug/Infectious Status (If Applicable):  No results found for: HIV, HEPCAB    COVID-19 Screening (If Applicable): No results found for: COVID19        Anesthesia Evaluation  Patient summary reviewed and Nursing notes reviewed no history of anesthetic complications:   Airway: Mallampati: I  TM distance: >3 FB   Neck ROM: full  Mouth opening: > = 3 FB   Dental: normal exam         Pulmonary:Negative Pulmonary ROS and normal exam  breath sounds clear to auscultation  (+) current smoker                           Cardiovascular:Negative CV ROS    (+) hypertension:, hyperlipidemia        Rhythm: regular  Rate: normal                    Neuro/Psych:   Negative Neuro/Psych ROS  (+) psychiatric history:            GI/Hepatic/Renal: Neg GI/Hepatic/Renal ROS  (+) GERD:,           Endo/Other: Negative Endo/Other ROS   (+) Diabetes, . Abdominal:              PE comment: Deferred. Vascular: negative vascular ROS. Other Findings:           Anesthesia Plan      general     ASA 3     (Standard ASA monitors: continuous EKG, BP, HR, pulse oximeter, temperature, and capnography.)  Induction: intravenous. MIPS: Postoperative opioids intended and Prophylactic antiemetics administered. Anesthetic plan and risks discussed with patient (and family, if present. ).       Plan

## 2023-05-11 NOTE — ANESTHESIA POSTPROCEDURE EVALUATION
Department of Anesthesiology  Postprocedure Note    Patient: Ova Free  MRN: [id number]  YOB: 1995  Date of evaluation: 5/11/2023      Procedure Summary     Date: 05/11/23 Room / Location: [facility] MAIN OR 05 / [facility] MAIN OR    Anesthesia Start: 1550 Anesthesia Stop: 1628    Procedure: LOOP ELECTROCAUTERY EXCISION PROCEDURE (Vagina ) Diagnosis:       Papanicolaou smear of cervix with atypical squamous cells of undetermined significance (ASC-US)      (Papanicolaou smear of cervix with atypical squamous cells of undetermined significance (ASC-US) [R87.610])    Surgeons: Brenda Puckett MD Responsible Provider: Ronnie Luther MD    Anesthesia Type: general ASA Status: 3          Anesthesia Type: No value filed.     Char Phase I: Char Score: 10    Char Phase II: Char Score: 10      Anesthesia Post Evaluation    Patient location during evaluation: PACU  Patient participation: complete - patient cannot participate  Level of consciousness: awake  Pain score: 0  Airway patency: patent  Nausea & Vomiting: no nausea and no vomiting  Complications: no  Cardiovascular status: hemodynamically stable  Respiratory status: acceptable  Hydration status: stable  Multimodal analgesia pain management approach

## 2023-05-11 NOTE — PROGRESS NOTES
Pt. Transferred to [facility] via stretcher in stable condition. Bedside report given, SBAR reviewed.   Pt.'s mom, boyfriend and belongings at bedside

## 2023-05-11 NOTE — BRIEF OP NOTE
Brief Postoperative Note      Patient: Gabi Duval  YOB: 1995  MRN: [id number]    Date of Procedure: 5/11/2023    Pre-Op Diagnosis Codes:     * Papanicolaou smear of cervix with atypical squamous cells of undetermined significance (ASC-US) [R87.610]    Post-Op Diagnosis: Same       Procedure(s):  LOOP ELECTROCAUTERY EXCISION PROCEDURE    Surgeon(s):  Tova Ortega MD    Assistant:  None    Anesthesia: Monitor Anesthesia Care    Estimated Blood Loss (mL): 1 mL    Complications: None    Specimens:   ID Type Source Tests Collected by Time Destination   A : Cervical Cone biopsy Tissue Cervix SURGICAL PATHOLOGY Tova Ortega MD 5/11/2023 1610        Implants:  None      Drains: None    Findings: Normal appearing cervix      Electronically signed by Brielle Barclay MD on 5/11/2023 at 4:43 PM

## 2023-05-11 NOTE — PERIOP NOTE
Patient alert and oriented x 4 with respirations even and unlabored on RA. Patient discharged home per physician's order. Patient and patient's boyfriend Tomy Cordon verbalize understanding of patient's discharge instructions. Patient transported via wheelchair to front hospital entrance with patient's boyfriend present to transport patient via private vehicle.

## 2023-05-12 NOTE — OP NOTE
[facility]  OPERATIVE REPORT    Name:  Geovani Henson  MR#:  [id number]  :  1995  ACCOUNT #:  [de-identified]  DATE OF SERVICE:  2023    PREOPERATIVE DIAGNOSES:  High-grade intraepithelial lesion of the uterine cervix on colposcopy. POSTOPERATIVE DIAGNOSES:  High-grade intraepithelial lesion of the uterine cervix on colposcopy. PROCEDURE PERFORMED:  Loop electrocautery excision procedure. SURGEON:  Renee Almanza MD.    ASSISTANT: None. ANESTHESIA:  General.    FLUIDS:  Crystalloid. COMPLICATIONS:  None. SPECIMENS REMOVED:  Cervical cone biopsy. IMPLANTS:  None. DRAINS:  None. ESTIMATED BLOOD LOSS:  1 mL. PROCEDURE:  The patient was taken to the operating room, where she was placed under general anesthesia. She was prepped utilizing Betadine. Coated speculum was placed in the vaginal wall. Lugol's solution was placed on the cervix. The cervix was injected at the 3 and 9 o'clock positions utilizing a solution of 1% lidocaine with epinephrine. A large electrocautery loop was selected and the entire transformation zone was removed with a single pass. This was passed off the field and marked at the 12 o'clock position with a suture. Ball cautery was utilized to cauterize the base of the entire excision area. There was noted to be good hemostasis. Subsequently, all instruments were removed and the patient was taken to the recovery room in good condition.       MD IRVIN Ontiveros/[email or web address]  D:  2023 9:41  T:  2023 11:36  JOB #:  [id number]

## 2023-05-17 ENCOUNTER — TELEPHONE (OUTPATIENT)
Age: 28
End: 2023-05-17

## 2023-05-17 NOTE — TELEPHONE ENCOUNTER
Patient called asking if her pathology results are available. Advised her they are not yet available.   Scheduled her for her post op appointment on 05/26/23

## 2023-05-24 ENCOUNTER — TELEPHONE (OUTPATIENT)
Age: 28
End: 2023-05-24

## 2023-05-24 NOTE — TELEPHONE ENCOUNTER
Returned the patient's call and advised her her pathology revealed TRINITY 2-3 with negative margins. She states she has been having bleeding that is dark brown to dark red in color since the procedure. She was advised the LEEP procedure could cause her cycle to start early and she is to keep her post op appointment on 05/26/23.

## 2023-05-26 ENCOUNTER — OFFICE VISIT (OUTPATIENT)
Age: 28
End: 2023-05-26

## 2023-05-26 VITALS — DIASTOLIC BLOOD PRESSURE: 77 MMHG | WEIGHT: 209.8 LBS | BODY MASS INDEX: 32.93 KG/M2 | SYSTOLIC BLOOD PRESSURE: 124 MMHG

## 2023-05-26 DIAGNOSIS — Z09 SURGERY FOLLOW-UP EXAMINATION: Primary | ICD-10-CM

## 2023-05-26 RX ORDER — MEDROXYPROGESTERONE ACETATE 10 MG/1
10 TABLET ORAL DAILY
Qty: 10 TABLET | Refills: 0 | Status: SHIPPED | OUTPATIENT
Start: 2023-05-26

## 2023-05-30 DIAGNOSIS — R10.2 PELVIC PAIN: Primary | ICD-10-CM

## 2023-06-17 ENCOUNTER — HOSPITAL ENCOUNTER (EMERGENCY)
Facility: HOSPITAL | Age: 28
Discharge: HOME | End: 2023-06-17
Attending: STUDENT IN AN ORGANIZED HEALTH CARE EDUCATION/TRAINING PROGRAM
Payer: MEDICAID

## 2023-06-17 VITALS
OXYGEN SATURATION: 100 % | DIASTOLIC BLOOD PRESSURE: 94 MMHG | SYSTOLIC BLOOD PRESSURE: 133 MMHG | HEART RATE: 86 BPM | RESPIRATION RATE: 18 BRPM

## 2023-06-17 DIAGNOSIS — A60.04 HERPES SIMPLEX VIRUS (HSV) INFECTION OF VAGINA: Primary | ICD-10-CM

## 2023-06-17 PROCEDURE — 99283 EMERGENCY DEPT VISIT LOW MDM: CPT | Performed by: STUDENT IN AN ORGANIZED HEALTH CARE EDUCATION/TRAINING PROGRAM

## 2023-06-17 PROCEDURE — 6370000000 HC RX 637 (ALT 250 FOR IP): Performed by: STUDENT IN AN ORGANIZED HEALTH CARE EDUCATION/TRAINING PROGRAM

## 2023-06-17 RX ORDER — HYDROCODONE BITARTRATE AND ACETAMINOPHEN 5; 325 MG/1; MG/1
1 TABLET ORAL EVERY 8 HOURS PRN
Qty: 9 TABLET | Refills: 0 | Status: SHIPPED | OUTPATIENT
Start: 2023-06-17 | End: 2023-06-20

## 2023-06-17 RX ORDER — VALACYCLOVIR HYDROCHLORIDE 1 G/1
1000 TABLET, FILM COATED ORAL 2 TIMES DAILY
Qty: 20 TABLET | Refills: 0 | Status: SHIPPED | OUTPATIENT
Start: 2023-06-17 | End: 2023-06-27

## 2023-06-17 RX ORDER — HYDROCODONE BITARTRATE AND ACETAMINOPHEN 5; 325 MG/1; MG/1
1 TABLET ORAL
Status: COMPLETED | OUTPATIENT
Start: 2023-06-17 | End: 2023-06-17

## 2023-06-17 RX ADMIN — HYDROCODONE BITARTRATE AND ACETAMINOPHEN 1 TABLET: 5; 325 TABLET ORAL at 04:50

## 2023-06-17 ASSESSMENT — LIFESTYLE VARIABLES
HOW OFTEN DO YOU HAVE A DRINK CONTAINING ALCOHOL: NEVER
HOW MANY STANDARD DRINKS CONTAINING ALCOHOL DO YOU HAVE ON A TYPICAL DAY: PATIENT DOES NOT DRINK

## 2023-06-17 ASSESSMENT — PAIN - FUNCTIONAL ASSESSMENT: PAIN_FUNCTIONAL_ASSESSMENT: 0-10

## 2023-06-17 ASSESSMENT — PAIN SCALES - GENERAL: PAINLEVEL_OUTOF10: 10

## 2023-06-17 NOTE — ED TRIAGE NOTES
Patient reports 3 months ago being diagnosed with herpes, she had LEEP on may 11. Is having a very painful outbreak, tried to reach her PCP with no response.

## 2023-06-17 NOTE — DISCHARGE INSTRUCTIONS
Thank you! Thank you for allowing me to care for you in the emergency department. I sincerely hope that you are satisfied with your visit today. It is my goal to provide you with excellent care. Below you will find a list of your labs and imaging from your visit today if applicable. Should you have any questions regarding these results please do not hesitate to call the emergency department. Please review [employer] for a more detailed result list since the below list may not be comprehensive. Instructions on how to sign up to [employer] should be provided in this packet. Labs -  No results found for this or any previous visit (from the past 12 hour(s)). Radiologic Studies -   No orders to display          If you feel that you have not received excellent quality care or timely care, please ask to speak to the nurse manager. Please choose us in the future for your continued health care needs. ------------------------------------------------------------------------------------------------------------  The exam and treatment you received in the Emergency Department were for an urgent problem and are not intended as complete care. It is important that you follow-up with a doctor, nurse practitioner, or physician assistant to:  (1) confirm your diagnosis,  (2) re-evaluation of changes in your illness and treatment, and  (3) for ongoing care. If your symptoms become worse or you do not improve as expected and you are unable to reach your usual health care provider, you should return to the Emergency Department. We are available 24 hours a day. Please take your discharge instructions with you when you go to your follow-up appointment. If a prescription has been provided, please have it filled as soon as possible to prevent a delay in treatment. Read the entire medication instruction sheet provided to you by the pharmacy.  If you have any questions or reservations about taking the medication due to side

## 2023-06-17 NOTE — ED PROVIDER NOTES
[address] [facility]  EMERGENCY DEPARTMENT ENCOUNTER NOTE    Date: 2023  Patient Name: Charles Salcedo    History of Presenting Illness     Chief Complaint   Patient presents with    Other     OBGYN complaint       History obtained from: Patient    HPI: Charles Salcedo, 25 y.o. female with past medical history as listed and reviewed below presents for vaginal irritation and pain. She started having vesicles over the past few days. Similar to her prior herpes flareups. No vaginal discharge or bleeding. Denies the possibility of being pregnant. No abdominal pain or cramps. No nausea or vomiting. .    Medical History   I reviewed the medical, surgical, family, and social history, as well as allergies:    PCP: Nieves Mandujano MD    Past Medical History:  Past Medical History:   Diagnosis Date    Anxiety     Depression     Diabetes ([address]     GERD (gastroesophageal reflux disease)     Heavy menses     pt states off and on x 1 year    High cholesterol     HSV-2 infection     Hypertension     Vaginal yeast infection     Vaginitis and vulvovaginitis      Past Surgical History:  Past Surgical History:   Procedure Laterality Date     SECTION      LEEP N/A 2023    LOOP ELECTROCAUTERY EXCISION PROCEDURE performed by Venessa Ro MD at [facility] MAIN OR     Current Outpatient Medications:  Current Outpatient Medications   Medication Instructions    DICLOFENAC PO 1 mg, Oral, DAILY    HumaLOG 8 Units, SubCUTAneous, 3 TIMES DAILY BEFORE MEALS, Sliding scale    HYDROcodone-acetaminophen (NORCO) 5-325 MG per tablet 1 tablet, Oral, EVERY 8 HOURS PRN, Intended supply: 3 days. Take lowest dose possible to manage pain    ibuprofen (ADVIL;MOTRIN) 800 mg, Oral, EVERY 6 HOURS PRN    Lantus SoloStar 20 Units, SubCUTAneous, DAILY    Lidocaine 4 % GEL Apply twice daily as needed to labia (outer vaginal area) for irritation and pain.  Do not apply with the clotrimazole at the same

## 2023-06-19 ENCOUNTER — TELEPHONE (OUTPATIENT)
Age: 28
End: 2023-06-19

## 2023-06-19 ENCOUNTER — HOSPITAL ENCOUNTER (EMERGENCY)
Facility: HOSPITAL | Age: 28
Discharge: HOME | End: 2023-06-19
Payer: MEDICAID

## 2023-06-19 VITALS
BODY MASS INDEX: 32.02 KG/M2 | HEIGHT: 67 IN | OXYGEN SATURATION: 98 % | TEMPERATURE: 98.7 F | DIASTOLIC BLOOD PRESSURE: 74 MMHG | SYSTOLIC BLOOD PRESSURE: 125 MMHG | WEIGHT: 204 LBS | RESPIRATION RATE: 16 BRPM | HEART RATE: 93 BPM

## 2023-06-19 DIAGNOSIS — A60.04 HERPESVIRAL VULVOVAGINITIS: Primary | ICD-10-CM

## 2023-06-19 DIAGNOSIS — A60.04 HERPES SIMPLEX VIRUS (HSV) INFECTION OF VAGINA: Primary | ICD-10-CM

## 2023-06-19 PROCEDURE — 99283 EMERGENCY DEPT VISIT LOW MDM: CPT

## 2023-06-19 PROCEDURE — 6370000000 HC RX 637 (ALT 250 FOR IP)

## 2023-06-19 RX ORDER — IBUPROFEN 800 MG/1
800 TABLET ORAL EVERY 8 HOURS PRN
Qty: 20 TABLET | Refills: 0 | Status: SHIPPED | OUTPATIENT
Start: 2023-06-19 | End: 2023-06-29

## 2023-06-19 RX ORDER — FLUCONAZOLE 150 MG/1
150 TABLET ORAL ONCE
Qty: 1 TABLET | Refills: 0 | Status: SHIPPED | OUTPATIENT
Start: 2023-06-19 | End: 2023-06-19

## 2023-06-19 RX ORDER — IBUPROFEN 800 MG/1
800 TABLET ORAL EVERY 8 HOURS PRN
Qty: 30 TABLET | Refills: 0 | OUTPATIENT
Start: 2023-06-19 | End: 2023-06-29

## 2023-06-19 RX ORDER — ACYCLOVIR 50 MG/G
OINTMENT TOPICAL
Qty: 15 G | Refills: 1 | Status: SHIPPED | OUTPATIENT
Start: 2023-06-19 | End: 2023-06-26

## 2023-06-19 RX ORDER — ACYCLOVIR 800 MG/1
800 TABLET ORAL 2 TIMES DAILY
Qty: 14 TABLET | Refills: 0 | Status: SHIPPED | OUTPATIENT
Start: 2023-06-19 | End: 2023-06-26

## 2023-06-19 RX ORDER — HYDROCODONE BITARTRATE AND ACETAMINOPHEN 5; 325 MG/1; MG/1
1 TABLET ORAL EVERY 8 HOURS PRN
Qty: 6 TABLET | Refills: 0 | Status: SHIPPED | OUTPATIENT
Start: 2023-06-19 | End: 2023-06-22

## 2023-06-19 RX ORDER — HYDROCODONE BITARTRATE AND ACETAMINOPHEN 5; 325 MG/1; MG/1
1 TABLET ORAL
Status: COMPLETED | OUTPATIENT
Start: 2023-06-19 | End: 2023-06-19

## 2023-06-19 RX ADMIN — HYDROCODONE BITARTRATE AND ACETAMINOPHEN 1 TABLET: 5; 325 TABLET ORAL at 19:25

## 2023-06-19 ASSESSMENT — PAIN SCALES - GENERAL
PAINLEVEL_OUTOF10: 10
PAINLEVEL_OUTOF10: 9

## 2023-06-19 ASSESSMENT — PAIN - FUNCTIONAL ASSESSMENT: PAIN_FUNCTIONAL_ASSESSMENT: 0-10

## 2023-06-19 NOTE — TELEPHONE ENCOUNTER
Spoke with Dr. Tip Ontiveros obtained new orders. Contacted patient back and she advises not to send motrin because she is already taking and wants to see if provider will send something else for pain. Advised will check with the provider and call her back.

## 2023-06-19 NOTE — TELEPHONE ENCOUNTER
Spoke with patient who reports that she has been to the ER for painful outbreak. She reports that lidocaine gel does not help and she has been taking motrin and tylenol and it is not helping. She reports painful and irritation and thinks she has a yeast infection. Scheduled appt with Dr. Lore Francisco for tomorrow. Will speak with Dr. Ami Ball for additional orders.

## 2023-06-19 NOTE — TELEPHONE ENCOUNTER
Patient has been calling since Thursday says she left a voicemail with no returned call. Juliette needs to speak to a nurse. Does not want to give me any information.

## 2023-06-19 NOTE — ED PROVIDER NOTES
[address] [facility]  EMERGENCY DEPARTMENT HISTORY AND PHYSICAL EXAM      Date: 2023  Patient Name: Viry Bennett  MRN: [id number]  [place]: 1995  Date of evaluation: 2023  Provider: Alea Avila PA-C   Note Started: 7:14 PM EDT 23    HISTORY OF PRESENT ILLNESS     Chief Complaint   Patient presents with    Dysuria       History Provided By: Patient    HPI: Viry Bennett is a 25 y.o. female with history of diabetes, hypertension, anxiety and depression, presents for recurrent herpes infection. Patient states that she was seen approximately 4 days ago placed on valacyclovir, however in the past has had relief of her symptoms with acyclovir and is requesting her medication be changed. States that she continues to have burning with urination because the urine hits open herpes lesions. Patient denies any chance of pregnancy.     PAST MEDICAL HISTORY   Past Medical History:  Past Medical History:   Diagnosis Date    Anxiety     Depression     Diabetes ([address]     GERD (gastroesophageal reflux disease)     Heavy menses     pt states off and on x 1 year    High cholesterol     HSV-2 infection     Hypertension     Vaginal yeast infection     Vaginitis and vulvovaginitis        Past Surgical History:  Past Surgical History:   Procedure Laterality Date     SECTION      LEEP N/A 2023    LOOP ELECTROCAUTERY EXCISION PROCEDURE performed by Carina Valdes MD at [facility] MAIN OR       Family History:  Family History   Problem Relation Age of Onset    No Known Problems Mother     Diabetes Father        Social History:  Social History     Tobacco Use    Smoking status: Former     Packs/day: 0.50     Types: Cigarettes    Smokeless tobacco: Never   Substance Use Topics    Alcohol use: Not Currently    Drug use: Not Currently       Allergies:  No Known Allergies    PCP: Janak Akbar MD    Current Meds:   No current facility-administered

## 2023-07-13 ENCOUNTER — HOSPITAL ENCOUNTER (EMERGENCY)
Facility: HOSPITAL | Age: 28
Discharge: HOME | End: 2023-07-13
Attending: EMERGENCY MEDICINE
Payer: MEDICAID

## 2023-07-13 VITALS
WEIGHT: 200 LBS | RESPIRATION RATE: 18 BRPM | HEART RATE: 92 BPM | DIASTOLIC BLOOD PRESSURE: 96 MMHG | HEIGHT: 67 IN | TEMPERATURE: 98.2 F | SYSTOLIC BLOOD PRESSURE: 150 MMHG | BODY MASS INDEX: 31.39 KG/M2 | OXYGEN SATURATION: 100 %

## 2023-07-13 DIAGNOSIS — J02.9 SORE THROAT: Primary | ICD-10-CM

## 2023-07-13 PROCEDURE — 6360000002 HC RX W HCPCS: Performed by: EMERGENCY MEDICINE

## 2023-07-13 PROCEDURE — 96372 THER/PROPH/DIAG INJ SC/IM: CPT

## 2023-07-13 PROCEDURE — 99284 EMERGENCY DEPT VISIT MOD MDM: CPT

## 2023-07-13 RX ORDER — KETOROLAC TROMETHAMINE 30 MG/ML
30 INJECTION, SOLUTION INTRAMUSCULAR; INTRAVENOUS
Status: COMPLETED | OUTPATIENT
Start: 2023-07-13 | End: 2023-07-13

## 2023-07-13 RX ORDER — DEXAMETHASONE SODIUM PHOSPHATE 10 MG/ML
10 INJECTION, SOLUTION INTRAMUSCULAR; INTRAVENOUS
Status: COMPLETED | OUTPATIENT
Start: 2023-07-13 | End: 2023-07-13

## 2023-07-13 RX ORDER — LIDOCAINE HYDROCHLORIDE 20 MG/ML
15 SOLUTION OROPHARYNGEAL
Qty: 250 ML | Refills: 0 | Status: SHIPPED | OUTPATIENT
Start: 2023-07-13 | End: 2023-07-21 | Stop reason: SDUPTHER

## 2023-07-13 RX ORDER — PREDNISOLONE 15 MG/5ML
30 SOLUTION ORAL DAILY
Qty: 30 ML | Refills: 0 | Status: SHIPPED | OUTPATIENT
Start: 2023-07-15 | End: 2023-07-18

## 2023-07-13 RX ADMIN — KETOROLAC TROMETHAMINE 30 MG: 30 INJECTION, SOLUTION INTRAMUSCULAR; INTRAVENOUS at 01:09

## 2023-07-13 RX ADMIN — DEXAMETHASONE SODIUM PHOSPHATE 10 MG: 10 INJECTION, SOLUTION INTRAMUSCULAR; INTRAVENOUS at 01:09

## 2023-07-13 ASSESSMENT — PAIN - FUNCTIONAL ASSESSMENT: PAIN_FUNCTIONAL_ASSESSMENT: 0-10

## 2023-07-13 ASSESSMENT — PAIN SCALES - GENERAL: PAINLEVEL_OUTOF10: 10

## 2023-07-13 NOTE — ED PROVIDER NOTES
Strain: Not on file   Food Insecurity: Not on file   Transportation Needs: Not on file   Physical Activity: Not on file   Stress: Not on file   Social Connections: Not on file   Intimate Partner Violence: Not on file   Depression: Not on file   Housing Stability: Not on file       PHYSICAL EXAM   Physical Exam  Physical Exam  Constitutional:       General: No acute distress. Appearance: Normal appearance. Not toxic-appearing. HENT:      Head: Normocephalic and atraumatic. Nose: Nose normal.      Mouth/Throat:      Mouth: Mucous membranes are moist.  Mild posterior oropharyngeal erythema without exudate  Eyes:      Extraocular Movements: Extraocular movements intact. Pupils: Pupils are equal, round, and reactive to light. Cardiovascular:      Rate and Rhythm: Normal rate. Pulses: Normal pulses. Pulmonary:      Effort: Pulmonary effort is normal.      Breath sounds: No stridor. Abdominal:      General: Abdomen is flat. There is no distension. Musculoskeletal:         General: Normal range of motion. Cervical back: Normal range of motion and neck supple. Skin:     General: Skin is warm and dry. Capillary Refill: Capillary refill takes less than 2 seconds. Neurological:      General: No focal deficit present. Mental Status: Aert and oriented to person, place, and time. Psychiatric:         Mood and Affect: Mood normal.         Behavior: Behavior normal.       SCREENINGS              LAB, EKG AND DIAGNOSTIC RESULTS   Labs:  No results found for this or any previous visit (from the past 12 hour(s)). EKG: Initial EKG interpreted by me. Not Applicable    Radiologic Studies:  Non-plain film images such as CT, Ultrasound and MRI are read by the radiologist. Plain radiographic images are visualized and preliminarily interpreted by the ED Physician with the following findings: Not Applicable.     Interpretation per the Radiologist below, if available at the time of this

## 2023-07-15 PROCEDURE — 99284 EMERGENCY DEPT VISIT MOD MDM: CPT

## 2023-07-16 ENCOUNTER — HOSPITAL ENCOUNTER (EMERGENCY)
Facility: HOSPITAL | Age: 28
Discharge: HOME | End: 2023-07-16
Attending: EMERGENCY MEDICINE
Payer: MEDICAID

## 2023-07-16 VITALS
OXYGEN SATURATION: 100 % | DIASTOLIC BLOOD PRESSURE: 97 MMHG | WEIGHT: 204 LBS | SYSTOLIC BLOOD PRESSURE: 137 MMHG | HEIGHT: 67 IN | BODY MASS INDEX: 32.02 KG/M2 | RESPIRATION RATE: 18 BRPM | TEMPERATURE: 98.4 F | HEART RATE: 89 BPM

## 2023-07-16 DIAGNOSIS — R73.9 HYPERGLYCEMIA: Primary | ICD-10-CM

## 2023-07-16 DIAGNOSIS — K08.89 PAIN, DENTAL: ICD-10-CM

## 2023-07-16 LAB
ALBUMIN SERPL-MCNC: 4.3 G/DL (ref 3.5–5)
ALBUMIN/GLOB SERPL: 1.1 (ref 1.1–2.2)
ALP SERPL-CCNC: 117 U/L (ref 45–117)
ALT SERPL-CCNC: 10 U/L (ref 12–78)
ANION GAP SERPL CALC-SCNC: 14 MMOL/L (ref 5–15)
AST SERPL W P-5'-P-CCNC: 7 U/L (ref 15–37)
BASOPHILS # BLD: 0 K/UL (ref 0–0.1)
BASOPHILS NFR BLD: 0 % (ref 0–1)
BILIRUB SERPL-MCNC: 0.6 MG/DL (ref 0.2–1)
BUN SERPL-MCNC: 19 MG/DL (ref 6–20)
BUN/CREAT SERPL: 17 (ref 12–20)
CA-I BLD-MCNC: 9.8 MG/DL (ref 8.5–10.1)
CHLORIDE SERPL-SCNC: 93 MMOL/L (ref 97–108)
CO2 SERPL-SCNC: 21 MMOL/L (ref 21–32)
CREAT SERPL-MCNC: 1.12 MG/DL (ref 0.55–1.02)
DIFFERENTIAL METHOD BLD: ABNORMAL
EOSINOPHIL # BLD: 0 K/UL (ref 0–0.4)
EOSINOPHIL NFR BLD: 0 % (ref 0–7)
ERYTHROCYTE [DISTWIDTH] IN BLOOD BY AUTOMATED COUNT: 11.7 % (ref 11.5–14.5)
GLOBULIN SER CALC-MCNC: 4 G/DL (ref 2–4)
GLUCOSE BLD STRIP.AUTO-MCNC: 374 MG/DL (ref 65–100)
GLUCOSE BLD STRIP.AUTO-MCNC: 558 MG/DL (ref 65–100)
GLUCOSE SERPL-MCNC: 583 MG/DL (ref 65–100)
HCT VFR BLD AUTO: 43.5 % (ref 35–47)
HGB BLD-MCNC: 15.4 G/DL (ref 11.5–16)
IMM GRANULOCYTES # BLD AUTO: 0 K/UL (ref 0–0.04)
IMM GRANULOCYTES NFR BLD AUTO: 0 % (ref 0–0.5)
LYMPHOCYTES # BLD: 1.6 K/UL (ref 0.8–3.5)
LYMPHOCYTES NFR BLD: 13 % (ref 12–49)
MCH RBC QN AUTO: 31.4 PG (ref 26–34)
MCHC RBC AUTO-ENTMCNC: 35.4 G/DL (ref 30–36.5)
MCV RBC AUTO: 88.8 FL (ref 80–99)
MONOCYTES # BLD: 0.3 K/UL (ref 0–1)
MONOCYTES NFR BLD: 2 % (ref 5–13)
NEUTS SEG # BLD: 10.3 K/UL (ref 1.8–8)
NEUTS SEG NFR BLD: 85 % (ref 32–75)
PERFORMED BY:: ABNORMAL
PERFORMED BY:: ABNORMAL
PLATELET # BLD AUTO: 291 K/UL (ref 150–400)
PMV BLD AUTO: 11.1 FL (ref 8.9–12.9)
POTASSIUM SERPL-SCNC: 4.2 MMOL/L (ref 3.5–5.1)
PROT SERPL-MCNC: 8.3 G/DL (ref 6.4–8.2)
RBC # BLD AUTO: 4.9 M/UL (ref 3.8–5.2)
SODIUM SERPL-SCNC: 128 MMOL/L (ref 136–145)
WBC # BLD AUTO: 12.2 K/UL (ref 3.6–11)

## 2023-07-16 PROCEDURE — 96374 THER/PROPH/DIAG INJ IV PUSH: CPT

## 2023-07-16 PROCEDURE — 85025 COMPLETE CBC W/AUTO DIFF WBC: CPT

## 2023-07-16 PROCEDURE — 96361 HYDRATE IV INFUSION ADD-ON: CPT

## 2023-07-16 PROCEDURE — 6370000000 HC RX 637 (ALT 250 FOR IP): Performed by: EMERGENCY MEDICINE

## 2023-07-16 PROCEDURE — 6360000002 HC RX W HCPCS: Performed by: EMERGENCY MEDICINE

## 2023-07-16 PROCEDURE — 96375 TX/PRO/DX INJ NEW DRUG ADDON: CPT

## 2023-07-16 PROCEDURE — 36415 COLL VENOUS BLD VENIPUNCTURE: CPT

## 2023-07-16 PROCEDURE — 82962 GLUCOSE BLOOD TEST: CPT

## 2023-07-16 PROCEDURE — 80053 COMPREHEN METABOLIC PANEL: CPT

## 2023-07-16 PROCEDURE — 2580000003 HC RX 258: Performed by: EMERGENCY MEDICINE

## 2023-07-16 RX ORDER — AMOXICILLIN AND CLAVULANATE POTASSIUM 875; 125 MG/1; MG/1
1 TABLET, FILM COATED ORAL
Status: COMPLETED | OUTPATIENT
Start: 2023-07-16 | End: 2023-07-16

## 2023-07-16 RX ORDER — 0.9 % SODIUM CHLORIDE 0.9 %
1000 INTRAVENOUS SOLUTION INTRAVENOUS
Status: COMPLETED | OUTPATIENT
Start: 2023-07-16 | End: 2023-07-16

## 2023-07-16 RX ORDER — HYDROCODONE BITARTRATE AND ACETAMINOPHEN 5; 325 MG/1; MG/1
1 TABLET ORAL EVERY 8 HOURS PRN
Qty: 12 TABLET | Refills: 0 | Status: SHIPPED | OUTPATIENT
Start: 2023-07-16 | End: 2023-07-21

## 2023-07-16 RX ORDER — AMOXICILLIN AND CLAVULANATE POTASSIUM 500; 125 MG/1; MG/1
1 TABLET, FILM COATED ORAL 3 TIMES DAILY
Qty: 21 TABLET | Refills: 0 | Status: SHIPPED | OUTPATIENT
Start: 2023-07-16 | End: 2023-07-21

## 2023-07-16 RX ORDER — MORPHINE SULFATE 4 MG/ML
4 INJECTION INTRAVENOUS ONCE
Status: COMPLETED | OUTPATIENT
Start: 2023-07-16 | End: 2023-07-16

## 2023-07-16 RX ORDER — ONDANSETRON 2 MG/ML
4 INJECTION INTRAMUSCULAR; INTRAVENOUS ONCE
Status: COMPLETED | OUTPATIENT
Start: 2023-07-16 | End: 2023-07-16

## 2023-07-16 RX ORDER — KETOROLAC TROMETHAMINE 30 MG/ML
15 INJECTION, SOLUTION INTRAMUSCULAR; INTRAVENOUS EVERY 6 HOURS PRN
Status: DISCONTINUED | OUTPATIENT
Start: 2023-07-16 | End: 2023-07-16 | Stop reason: HOSPADM

## 2023-07-16 RX ADMIN — SODIUM CHLORIDE 1000 ML: 9 INJECTION, SOLUTION INTRAVENOUS at 00:27

## 2023-07-16 RX ADMIN — KETOROLAC TROMETHAMINE 15 MG: 30 INJECTION, SOLUTION INTRAMUSCULAR; INTRAVENOUS at 01:02

## 2023-07-16 RX ADMIN — AMOXICILLIN AND CLAVULANATE POTASSIUM 1 TABLET: 875; 125 TABLET ORAL at 01:07

## 2023-07-16 RX ADMIN — ONDANSETRON 4 MG: 2 INJECTION INTRAMUSCULAR; INTRAVENOUS at 01:00

## 2023-07-16 RX ADMIN — SODIUM CHLORIDE 1000 ML: 9 INJECTION, SOLUTION INTRAVENOUS at 00:28

## 2023-07-16 RX ADMIN — INSULIN HUMAN 15 UNITS: 100 INJECTION, SOLUTION PARENTERAL at 00:21

## 2023-07-16 RX ADMIN — MORPHINE SULFATE 4 MG: 4 INJECTION, SOLUTION INTRAMUSCULAR; INTRAVENOUS at 01:07

## 2023-07-16 ASSESSMENT — LIFESTYLE VARIABLES
HOW MANY STANDARD DRINKS CONTAINING ALCOHOL DO YOU HAVE ON A TYPICAL DAY: PATIENT DOES NOT DRINK
HOW OFTEN DO YOU HAVE A DRINK CONTAINING ALCOHOL: NEVER

## 2023-07-16 ASSESSMENT — PAIN - FUNCTIONAL ASSESSMENT: PAIN_FUNCTIONAL_ASSESSMENT: 0-10

## 2023-07-16 ASSESSMENT — PAIN SCALES - GENERAL: PAINLEVEL_OUTOF10: 10

## 2023-07-21 ENCOUNTER — HOSPITAL ENCOUNTER (EMERGENCY)
Facility: HOSPITAL | Age: 28
Discharge: HOME | End: 2023-07-21
Attending: STUDENT IN AN ORGANIZED HEALTH CARE EDUCATION/TRAINING PROGRAM
Payer: MEDICAID

## 2023-07-21 VITALS
RESPIRATION RATE: 18 BRPM | OXYGEN SATURATION: 100 % | TEMPERATURE: 98.4 F | BODY MASS INDEX: 32.02 KG/M2 | SYSTOLIC BLOOD PRESSURE: 138 MMHG | HEART RATE: 88 BPM | HEIGHT: 67 IN | DIASTOLIC BLOOD PRESSURE: 92 MMHG | WEIGHT: 204 LBS

## 2023-07-21 DIAGNOSIS — R73.9 HYPERGLYCEMIA: ICD-10-CM

## 2023-07-21 DIAGNOSIS — I10 ESSENTIAL HYPERTENSION: ICD-10-CM

## 2023-07-21 DIAGNOSIS — K08.89 PAIN, DENTAL: Primary | ICD-10-CM

## 2023-07-21 LAB
ANION GAP SERPL CALC-SCNC: 7 MMOL/L (ref 5–15)
BASOPHILS # BLD: 0 K/UL (ref 0–0.1)
BASOPHILS NFR BLD: 0 % (ref 0–1)
BUN SERPL-MCNC: 12 MG/DL (ref 6–20)
BUN/CREAT SERPL: 13 (ref 12–20)
CA-I BLD-MCNC: 9 MG/DL (ref 8.5–10.1)
CHLORIDE SERPL-SCNC: 101 MMOL/L (ref 97–108)
CO2 SERPL-SCNC: 23 MMOL/L (ref 21–32)
CREAT SERPL-MCNC: 0.93 MG/DL (ref 0.55–1.02)
DIFFERENTIAL METHOD BLD: ABNORMAL
EOSINOPHIL # BLD: 0.1 K/UL (ref 0–0.4)
EOSINOPHIL NFR BLD: 1 % (ref 0–7)
ERYTHROCYTE [DISTWIDTH] IN BLOOD BY AUTOMATED COUNT: 11.8 % (ref 11.5–14.5)
GLUCOSE SERPL-MCNC: 361 MG/DL (ref 65–100)
HCG SERPL QL: NEGATIVE
HCT VFR BLD AUTO: 43.5 % (ref 35–47)
HGB BLD-MCNC: 15 G/DL (ref 11.5–16)
IMM GRANULOCYTES # BLD AUTO: 0 K/UL (ref 0–0.04)
IMM GRANULOCYTES NFR BLD AUTO: 0 % (ref 0–0.5)
LYMPHOCYTES # BLD: 3.8 K/UL (ref 0.8–3.5)
LYMPHOCYTES NFR BLD: 39 % (ref 12–49)
MCH RBC QN AUTO: 31 PG (ref 26–34)
MCHC RBC AUTO-ENTMCNC: 34.5 G/DL (ref 30–36.5)
MCV RBC AUTO: 89.9 FL (ref 80–99)
MONOCYTES # BLD: 0.5 K/UL (ref 0–1)
MONOCYTES NFR BLD: 5 % (ref 5–13)
NEUTS SEG # BLD: 5.3 K/UL (ref 1.8–8)
NEUTS SEG NFR BLD: 55 % (ref 32–75)
NRBC # BLD: 0 K/UL (ref 0–0.01)
NRBC BLD-RTO: 0 PER 100 WBC
PLATELET # BLD AUTO: 288 K/UL (ref 150–400)
PMV BLD AUTO: 11 FL (ref 8.9–12.9)
POTASSIUM SERPL-SCNC: 3.8 MMOL/L (ref 3.5–5.1)
RBC # BLD AUTO: 4.84 M/UL (ref 3.8–5.2)
SODIUM SERPL-SCNC: 131 MMOL/L (ref 136–145)
WBC # BLD AUTO: 9.8 K/UL (ref 3.6–11)

## 2023-07-21 PROCEDURE — 99283 EMERGENCY DEPT VISIT LOW MDM: CPT

## 2023-07-21 PROCEDURE — 84703 CHORIONIC GONADOTROPIN ASSAY: CPT

## 2023-07-21 PROCEDURE — 80048 BASIC METABOLIC PNL TOTAL CA: CPT

## 2023-07-21 PROCEDURE — 6370000000 HC RX 637 (ALT 250 FOR IP): Performed by: STUDENT IN AN ORGANIZED HEALTH CARE EDUCATION/TRAINING PROGRAM

## 2023-07-21 PROCEDURE — 85025 COMPLETE CBC W/AUTO DIFF WBC: CPT

## 2023-07-21 PROCEDURE — 36415 COLL VENOUS BLD VENIPUNCTURE: CPT

## 2023-07-21 RX ORDER — LIDOCAINE HYDROCHLORIDE 20 MG/ML
15 SOLUTION OROPHARYNGEAL
Qty: 250 ML | Refills: 0 | Status: SHIPPED | OUTPATIENT
Start: 2023-07-21

## 2023-07-21 RX ORDER — KETOROLAC TROMETHAMINE 10 MG/1
10 TABLET, FILM COATED ORAL EVERY 6 HOURS PRN
Qty: 20 TABLET | Refills: 0 | Status: SHIPPED | OUTPATIENT
Start: 2023-07-21 | End: 2023-07-25 | Stop reason: SDUPTHER

## 2023-07-21 RX ORDER — CLINDAMYCIN HYDROCHLORIDE 150 MG/1
450 CAPSULE ORAL 3 TIMES DAILY
Qty: 63 CAPSULE | Refills: 0 | Status: SHIPPED | OUTPATIENT
Start: 2023-07-21 | End: 2023-07-25 | Stop reason: SDUPTHER

## 2023-07-21 RX ORDER — ACETAMINOPHEN 500 MG
1000 TABLET ORAL EVERY 6 HOURS PRN
Qty: 40 TABLET | Refills: 0 | Status: SHIPPED | OUTPATIENT
Start: 2023-07-21

## 2023-07-21 RX ORDER — MORPHINE SULFATE 15 MG/1
15 TABLET ORAL
Status: COMPLETED | OUTPATIENT
Start: 2023-07-21 | End: 2023-07-21

## 2023-07-21 RX ORDER — ACETAMINOPHEN 500 MG
1000 TABLET ORAL
Status: COMPLETED | OUTPATIENT
Start: 2023-07-21 | End: 2023-07-21

## 2023-07-21 RX ADMIN — MORPHINE SULFATE 15 MG: 15 TABLET ORAL at 03:31

## 2023-07-21 RX ADMIN — ACETAMINOPHEN 1000 MG: 500 TABLET ORAL at 03:30

## 2023-07-21 ASSESSMENT — PAIN SCALES - GENERAL
PAINLEVEL_OUTOF10: 10
PAINLEVEL_OUTOF10: 5
PAINLEVEL_OUTOF10: 9

## 2023-07-21 ASSESSMENT — PAIN DESCRIPTION - LOCATION
LOCATION: MOUTH
LOCATION: MOUTH
LOCATION: TEETH
LOCATION: MOUTH

## 2023-07-21 ASSESSMENT — PAIN - FUNCTIONAL ASSESSMENT: PAIN_FUNCTIONAL_ASSESSMENT: 0-10

## 2023-07-21 ASSESSMENT — PAIN DESCRIPTION - DESCRIPTORS: DESCRIPTORS: NAGGING

## 2023-07-21 NOTE — DISCHARGE INSTRUCTIONS
Dental resources:    Emergency [address] [address]   [address], [place], [address] [facility]   Monday, Wednesday, Friday: 8am-5pm   Tuesday and Thursday: 8am-6pm   Phone: [phone]   $70 for Emergency Care   $60 for first routine care, then pay by sliding scale based upon income     [facility]   [address], [address] [place]   Phone: [phone], select option (2) to confirm time for treatment     [facility]   [address], [address] [facility] [address] [place]   Monday-Friday: 8am-4pm   Phone: [phone] of Dentistry Urgent [address] [facility], [address] [place] [address], [address] [place] [address]   Phone: [phone] to confirm a time for emergency treatment   Pediatrics: [phone]   $75 per tooth, extractions only     [facility]   [address], [address]  [place] [address]   Phone [phone] or [phone]   Hours [id number] (extractions)   Simple tooth extraction: $60 per tooth, $55 per x-ray     [facility] (in [place])   [facility] AT [place] only   Phone: [phone], leave message saying you need an appointment to register   Hours: Wed 6-9p       Non-Urgent [facility]aima MALAVE [address] [facility] [address] at [address] [facility]  [address], [place], [address] [facility], [place]   Dental Clinic: [phone]   Oral Surgery Clinic: [phone]

## 2023-07-21 NOTE — ED NOTES
Patient did not want blood work drawn that was ordered. Provider notified and spoke with patient. Patient agreed to blood work at this time.      Kiera Anthony, APOORVA  28/94/13 [facility], RN  90/28/56 [id number]

## 2023-07-21 NOTE — ED PROVIDER NOTES
[facility] EMERGENCY DEPT  EMERGENCY DEPARTMENT HISTORY AND PHYSICAL EXAM      Date: 2023  Patient Name: Steffany Castro  MRN: [id number]  [address] [facility] [place] 1995  Date of evaluation: 2023  Provider: Santiago Davis MD   Note Started: 3:10 AM EDT 23    HISTORY OF PRESENT ILLNESS     Chief Complaint   Patient presents with    Dental Pain       History Provided By: Patient    HPI: Steffany Castro is a 25 y.o. female with PMH of anxiety, depression, DM, HTN, HLD comes to the ED with mental pain patient reports Dental pain was seen recently at the [facility] ED was prescribed Augmentin. She reports the back was not been helping. Comes to the ED due to inability to sleep due to pain. She denies any difficulty with opening her mouth or any foul-smelling drainage in her mouth. She reports that her mouth feel a bit swollen. No fever, chills or sweats. I did review her work-up from several days ago which shows that she has leukocytosis and hyperglycemia that was treated. She has been compliant with her antibiotics. However, the pain has been worsening. Otherwise does not have any other concerns or symptoms today.     PAST MEDICAL HISTORY   Past Medical History:  Past Medical History:   Diagnosis Date    Anxiety     Depression     Diabetes ([address])     GERD (gastroesophageal reflux disease)     Heavy menses     pt states off and on x 1 year    High cholesterol     HSV-2 infection     Hypertension     Vaginal yeast infection     Vaginitis and vulvovaginitis        Past Surgical History:  Past Surgical History:   Procedure Laterality Date     SECTION      LEEP N/A 2023    LOOP ELECTROCAUTERY EXCISION PROCEDURE performed by Tim Phillips MD at [facility] MAIN OR       Family History:  Family History   Problem Relation Age of Onset    No Known Problems Mother     Diabetes Father        Social History:  Social History     Tobacco Use    Smoking status: Former     Packs/day: 0.50     Types: following medications:  Medications   acetaminophen (TYLENOL) tablet 1,000 mg (1,000 mg Oral Given 7/21/23 0330)   morphine (MSIR) tablet 15 mg (15 mg Oral Given 7/21/23 0331)       ED FINAL IMPRESSION     1. Pain, dental    2. Essential hypertension    3. Hyperglycemia          DISPOSITION/PLAN   DISPOSITION Decision To Discharge 07/21/2023 05:07:46 AM    Discharge Note: The patient is stable for discharge home. The signs, symptoms, diagnosis, and discharge instructions have been discussed, understanding conveyed, and agreed upon. The patient is to follow up as recommended or return to ER should their symptoms worsen. PATIENT REFERRED TO:  [facility] EMERGENCY DEPT  [address]  [phone]  Go to   If symptoms worsen, As needed        DISCHARGE MEDICATIONS:     Medication List        START taking these medications      acetaminophen 500 MG tablet  Commonly known as: TYLENOL  Take 2 tablets by mouth every 6 hours as needed for Pain     clindamycin 150 MG capsule  Commonly known as: CLEOCIN  Take 3 capsules by mouth 3 times daily for 7 days     ketorolac 10 MG tablet  Commonly known as: TORADOL  Take 1 tablet by mouth every 6 hours as needed for Pain            CHANGE how you take these medications      lidocaine viscous hcl 2 % Soln solution  Commonly known as: XYLOCAINE  Take 15 mLs by mouth every 3 hours as needed (Sore throat) Take 15 mLs by mouth every 3 hours as needed for dental pain  What changed: additional instructions            ASK your doctor about these medications      * acyclovir 5 % ointment  Commonly known as: Zovirax  Apply topically 5 times daily. * acyclovir 5 % ointment  Commonly known as: Zovirax  Apply topically every 3 hours.      HumaLOG 100 UNIT/ML injection cartridge  Generic drug: insulin lispro     Lantus SoloStar 100 UNIT/ML injection pen  Generic drug: insulin glargine     Lidocaine 4 % Gel     Lidocaine HCl 0.5 % Gel     medroxyPROGESTERone 10

## 2023-07-25 ENCOUNTER — HOSPITAL ENCOUNTER (EMERGENCY)
Facility: HOSPITAL | Age: 28
Discharge: HOME | End: 2023-07-25
Attending: EMERGENCY MEDICINE
Payer: MEDICAID

## 2023-07-25 VITALS
SYSTOLIC BLOOD PRESSURE: 121 MMHG | DIASTOLIC BLOOD PRESSURE: 76 MMHG | RESPIRATION RATE: 16 BRPM | HEART RATE: 86 BPM | WEIGHT: 204 LBS | TEMPERATURE: 98.1 F | BODY MASS INDEX: 32.02 KG/M2 | OXYGEN SATURATION: 99 % | HEIGHT: 67 IN

## 2023-07-25 DIAGNOSIS — K08.89 DENTALGIA: Primary | ICD-10-CM

## 2023-07-25 PROCEDURE — 6360000002 HC RX W HCPCS: Performed by: EMERGENCY MEDICINE

## 2023-07-25 PROCEDURE — 99284 EMERGENCY DEPT VISIT MOD MDM: CPT

## 2023-07-25 PROCEDURE — 96372 THER/PROPH/DIAG INJ SC/IM: CPT

## 2023-07-25 RX ORDER — CLINDAMYCIN HYDROCHLORIDE 150 MG/1
450 CAPSULE ORAL 3 TIMES DAILY
Qty: 63 CAPSULE | Refills: 0 | Status: SHIPPED | OUTPATIENT
Start: 2023-07-25 | End: 2023-08-01

## 2023-07-25 RX ORDER — KETOROLAC TROMETHAMINE 10 MG/1
10 TABLET, FILM COATED ORAL EVERY 6 HOURS PRN
Qty: 20 TABLET | Refills: 0 | Status: SHIPPED | OUTPATIENT
Start: 2023-07-25

## 2023-07-25 RX ORDER — KETOROLAC TROMETHAMINE 30 MG/ML
30 INJECTION, SOLUTION INTRAMUSCULAR; INTRAVENOUS
Status: COMPLETED | OUTPATIENT
Start: 2023-07-25 | End: 2023-07-25

## 2023-07-25 RX ADMIN — KETOROLAC TROMETHAMINE 30 MG: 30 INJECTION, SOLUTION INTRAMUSCULAR; INTRAVENOUS at 22:10

## 2023-07-25 ASSESSMENT — PAIN DESCRIPTION - ORIENTATION: ORIENTATION: RIGHT

## 2023-07-25 ASSESSMENT — PAIN DESCRIPTION - LOCATION: LOCATION: FACE

## 2023-07-25 ASSESSMENT — PAIN SCALES - GENERAL: PAINLEVEL_OUTOF10: 10

## 2023-07-25 ASSESSMENT — PAIN - FUNCTIONAL ASSESSMENT: PAIN_FUNCTIONAL_ASSESSMENT: 0-10

## 2023-07-26 NOTE — ED PROVIDER NOTES
[facility] EMERGENCY DEPT  EMERGENCY DEPARTMENT HISTORY AND PHYSICAL EXAM      Date: 2023  Patient Name: Baltazar Crow  MRN: [id number]  [address] [facility] 1995  Date of evaluation: 2023  Provider: Ondina Dill MD   Note Started: 10:04 PM EDT 23    HISTORY OF PRESENT ILLNESS     Chief Complaint   Patient presents with    Facial Pain       History Provided By: Patient    HPI: Baltazar Crow is a 25 y.o. female past medical history of diabetes, GERD presents for evaluation of right-sided jaw pain. Has a recent history of an infected wisdom tooth which improved with antibiotics but after stopped antibiotics pain recurred. She states the pain got worse this evening. She did have relief from Toradol which she was prescribed on her last ER visit. She has a appointment to see dentistry this Friday. No systemic symptoms.     PAST MEDICAL HISTORY   Past Medical History:  Past Medical History:   Diagnosis Date    Anxiety     Depression     Diabetes ([address])     GERD (gastroesophageal reflux disease)     Heavy menses     pt states off and on x 1 year    High cholesterol     HSV-2 infection     Hypertension     Vaginal yeast infection     Vaginitis and vulvovaginitis        Past Surgical History:  Past Surgical History:   Procedure Laterality Date     SECTION      LEEP N/A 2023    LOOP ELECTROCAUTERY EXCISION PROCEDURE performed by Sammi Green MD at [facility] MAIN OR       Family History:  Family History   Problem Relation Age of Onset    No Known Problems Mother     Diabetes Father        Social History:  Social History     Tobacco Use    Smoking status: Former     Packs/day: 0.50     Types: Cigarettes    Smokeless tobacco: Never   Vaping Use    Vaping Use: Never used   Substance Use Topics    Alcohol use: Not Currently    Drug use: Not Currently       Allergies:  No Known Allergies    PCP: Zion Raymond MD    Current Meds:   No current facility-administered medications for this

## 2023-12-04 ENCOUNTER — TELEPHONE (OUTPATIENT)
Age: 28
End: 2023-12-04

## 2023-12-04 NOTE — TELEPHONE ENCOUNTER
Patient contacted the office reports that she went to [facility] and they started her on ocp and reports has been bleeding heavy every since. Advised patient if she has contacted the office to speak with provider to see if they will change medication. She advised that she would like to see Dr. Hoa Kelley. Appt scheduled.

## 2024-02-15 ENCOUNTER — HOSPITAL ENCOUNTER (EMERGENCY)
Facility: HOSPITAL | Age: 29
Discharge: HOME | End: 2024-02-15
Attending: STUDENT IN AN ORGANIZED HEALTH CARE EDUCATION/TRAINING PROGRAM
Payer: MEDICAID

## 2024-02-15 VITALS
TEMPERATURE: 98.6 F | HEART RATE: 74 BPM | OXYGEN SATURATION: 100 % | SYSTOLIC BLOOD PRESSURE: 105 MMHG | BODY MASS INDEX: 37.67 KG/M2 | DIASTOLIC BLOOD PRESSURE: 70 MMHG | HEIGHT: 67 IN | RESPIRATION RATE: 18 BRPM | WEIGHT: 240 LBS

## 2024-02-15 DIAGNOSIS — F11.20 METHADONE MAINTENANCE THERAPY PATIENT (HCC): Primary | ICD-10-CM

## 2024-02-15 PROCEDURE — 6370000000 HC RX 637 (ALT 250 FOR IP): Performed by: STUDENT IN AN ORGANIZED HEALTH CARE EDUCATION/TRAINING PROGRAM

## 2024-02-15 PROCEDURE — 99283 EMERGENCY DEPT VISIT LOW MDM: CPT

## 2024-02-15 RX ORDER — METHADONE HYDROCHLORIDE 10 MG/1
90 TABLET ORAL
Status: COMPLETED | OUTPATIENT
Start: 2024-02-15 | End: 2024-02-15

## 2024-02-15 RX ADMIN — METHADONE HYDROCHLORIDE 90 MG: 10 TABLET ORAL at 13:06

## 2024-02-15 NOTE — ED PROVIDER NOTES
[facility] EMERGENCY DEPT  EMERGENCY DEPARTMENT HISTORY AND PHYSICAL EXAM      Date: 2/15/2024  Patient Name: Juliette Saba  MRN: [id number]  Birthdate 1995  Date of evaluation: 2/15/2024  Provider: John Bales MD   Note Started: 1:40 PM EST 2/15/24    HISTORY OF PRESENT ILLNESS     Chief Complaint   Patient presents with    Missed Medication Dose       History Provided By: Patient    HPI: Juliette Saba is a 28 y.o. female with PMH of anxiety, depression, DM, HTN, GERD, and substance use disorder on MAT with methadone comes to the ED with concerns for missing her methadone dose today.  Patient reports otherwise she is asymptomatic and does not have any other concerns today that she would like to address.  She reports that she takes 90 mg of methadone daily.    PAST MEDICAL HISTORY   Past Medical History:  Past Medical History:   Diagnosis Date    Anxiety     Depression     Diabetes (HCC)     GERD (gastroesophageal reflux disease)     Heavy menses     pt states off and on x 1 year    High cholesterol     HSV-2 infection     Hypertension     Vaginal yeast infection     Vaginitis and vulvovaginitis        Past Surgical History:  Past Surgical History:   Procedure Laterality Date     SECTION      LEEP N/A 2023    LOOP ELECTROCAUTERY EXCISION PROCEDURE performed by Johan Christianson Jr., MD at [facility] MAIN OR       Family History:  Family History   Problem Relation Age of Onset    No Known Problems Mother     Diabetes Father        Social History:  Social History     Tobacco Use    Smoking status: Former     Current packs/day: 0.50     Types: Cigarettes    Smokeless tobacco: Never   Vaping Use    Vaping Use: Never used   Substance Use Topics    Alcohol use: Not Currently    Drug use: Not Currently       Allergies:  No Known Allergies    PCP: Apolinar Akhtar MD    Current Meds:   No current facility-administered medications for this encounter.     Current Outpatient Medications   Medication Sig

## 2024-02-22 ENCOUNTER — HOSPITAL ENCOUNTER (EMERGENCY)
Facility: HOSPITAL | Age: 29
Discharge: HOME | End: 2024-02-22
Attending: EMERGENCY MEDICINE
Payer: MEDICAID

## 2024-02-22 VITALS
HEART RATE: 77 BPM | HEIGHT: 67 IN | OXYGEN SATURATION: 100 % | TEMPERATURE: 99.1 F | RESPIRATION RATE: 16 BRPM | BODY MASS INDEX: 37.67 KG/M2 | DIASTOLIC BLOOD PRESSURE: 68 MMHG | WEIGHT: 240 LBS | SYSTOLIC BLOOD PRESSURE: 127 MMHG

## 2024-02-22 DIAGNOSIS — Z76.0 ENCOUNTER FOR MEDICATION REFILL: Primary | ICD-10-CM

## 2024-02-22 PROCEDURE — 6370000000 HC RX 637 (ALT 250 FOR IP): Performed by: EMERGENCY MEDICINE

## 2024-02-22 PROCEDURE — 99283 EMERGENCY DEPT VISIT LOW MDM: CPT

## 2024-02-22 RX ORDER — METHADONE HYDROCHLORIDE 5 MG/5ML
100 SOLUTION ORAL ONCE
Status: COMPLETED | OUTPATIENT
Start: 2024-02-22 | End: 2024-02-22

## 2024-02-22 RX ADMIN — METHADONE HYDROCHLORIDE 100 MG: 5 SOLUTION ORAL at 17:33

## 2024-02-22 ASSESSMENT — PAIN SCALES - GENERAL: PAINLEVEL_OUTOF10: 0

## 2024-02-22 ASSESSMENT — PAIN - FUNCTIONAL ASSESSMENT: PAIN_FUNCTIONAL_ASSESSMENT: 0-10

## 2024-02-22 NOTE — ED PROVIDER NOTES
[facility] EMERGENCY DEPT  EMERGENCY DEPARTMENT HISTORY AND PHYSICAL EXAM      Date: 2024  Patient Name: Juliette Saba  MRN: [id number]  Birthdate 1995  Date of evaluation: 2024  Provider: Jose Luis Covarrubias MD   Note Started: 3:58 PM EST 24    HISTORY OF PRESENT ILLNESS     Chief Complaint   Patient presents with    Medication Refill       History Provided By: Patient    HPI: Juliette Saba is a 30 y.o. female 28-year-old female with no past medical history significant for heroin abuse disorder on methadone who conveys that she missed her dose today and is here for a dose now.  She was seen here last week for the same thing.  Patient denies any overwhelming symptoms of withdrawal at this point in time.  Will reach out to [facility] for confirmation    PAST MEDICAL HISTORY   Past Medical History:  Past Medical History:   Diagnosis Date    Anxiety     Depression     Diabetes (HCC)     GERD (gastroesophageal reflux disease)     Heavy menses     pt states off and on x 1 year    High cholesterol     HSV-2 infection     Hypertension     Vaginal yeast infection     Vaginitis and vulvovaginitis        Past Surgical History:  Past Surgical History:   Procedure Laterality Date     SECTION      LEEP N/A 2023    LOOP ELECTROCAUTERY EXCISION PROCEDURE performed by Johan Christianson Jr., MD at [facility] MAIN OR       Family History:  Family History   Problem Relation Age of Onset    No Known Problems Mother     Diabetes Father        Social History:  Social History     Tobacco Use    Smoking status: Former     Current packs/day: 0.50     Types: Cigarettes    Smokeless tobacco: Never   Vaping Use    Vaping Use: Never used   Substance Use Topics    Alcohol use: Not Currently    Drug use: Not Currently       Allergies:  No Known Allergies    PCP: Apolinar Akhtar MD    Current Meds:   No current facility-administered medications for this encounter.     Current Outpatient Medications   Medication

## 2024-04-03 ENCOUNTER — HOSPITAL ENCOUNTER (EMERGENCY)
Facility: HOSPITAL | Age: 29
Discharge: HOME | End: 2024-04-03
Payer: MEDICAID

## 2024-04-03 VITALS
OXYGEN SATURATION: 100 % | HEART RATE: 92 BPM | DIASTOLIC BLOOD PRESSURE: 69 MMHG | HEIGHT: 67 IN | TEMPERATURE: 98.4 F | WEIGHT: 232 LBS | BODY MASS INDEX: 36.41 KG/M2 | SYSTOLIC BLOOD PRESSURE: 124 MMHG | RESPIRATION RATE: 18 BRPM

## 2024-04-03 DIAGNOSIS — Z76.89 ENCOUNTER FOR MEDICATION ADMINISTRATION: Primary | ICD-10-CM

## 2024-04-03 PROCEDURE — 99283 EMERGENCY DEPT VISIT LOW MDM: CPT

## 2024-04-03 PROCEDURE — 6370000000 HC RX 637 (ALT 250 FOR IP): Performed by: NURSE PRACTITIONER

## 2024-04-03 RX ORDER — METHADONE HYDROCHLORIDE 10 MG/1
130 TABLET ORAL
Status: COMPLETED | OUTPATIENT
Start: 2024-04-03 | End: 2024-04-03

## 2024-04-03 RX ADMIN — METHADONE HYDROCHLORIDE 130 MG: 10 TABLET ORAL at 13:15

## 2024-04-03 ASSESSMENT — PAIN SCALES - GENERAL: PAINLEVEL_OUTOF10: 0

## 2024-04-03 ASSESSMENT — PAIN - FUNCTIONAL ASSESSMENT: PAIN_FUNCTIONAL_ASSESSMENT: NONE - DENIES PAIN

## 2024-04-03 NOTE — ED TRIAGE NOTES
Pt reports that she has missed her Methadone dose for today, was unable to find ride to appointment. States that her dose is 130 mg

## 2024-04-03 NOTE — ED PROVIDER NOTES
20 tablet 0    lidocaine viscous hcl (XYLOCAINE) 2 % SOLN solution Take 15 mLs by mouth every 3 hours as needed (Sore throat) Take 15 mLs by mouth every 3 hours as needed for dental pain 250 mL 0    acetaminophen (TYLENOL) 500 MG tablet Take 2 tablets by mouth every 6 hours as needed for Pain 40 tablet 0    acyclovir (ZOVIRAX) 5 % ointment Apply topically 5 times daily. 15 g 1    acyclovir (ZOVIRAX) 5 % ointment Apply topically every 3 hours. 15 g 1    medroxyPROGESTERone (PROVERA) 10 MG tablet Take 1 tablet by mouth daily 10 tablet 0    insulin lispro (HUMALOG) 100 UNIT/ML injection cartridge Inject 8 Units into the skin 3 times daily (before meals) Sliding scale      insulin glargine (LANTUS SOLOSTAR) 100 UNIT/ML injection pen Inject 20 Units into the skin daily      Lidocaine 4 % GEL Apply twice daily as needed to labia (outer vaginal area) for irritation and pain. Do not apply with the clotrimazole at the same time.  Indications: skin irritation (Patient not taking: Reported on 5/9/2023)      Lidocaine HCl 0.5 % GEL Apply topically 3 times daily (Patient not taking: Reported on 5/9/2023)      medroxyPROGESTERone (DEPO-PROVERA) 150 MG/ML injection Inject 150 mg into the muscle once (Patient not taking: Reported on 5/9/2023)      metFORMIN (GLUCOPHAGE) 850 MG tablet Take 1 tablet by mouth 2 times daily (with meals) (Patient not taking: Reported on 5/9/2023)      omeprazole (PRILOSEC) 40 MG delayed release capsule Take 1 capsule by mouth daily (Patient not taking: Reported on 5/9/2023)      venlafaxine (EFFEXOR) 75 MG tablet Take 25 mg by mouth 3 times daily       Facility-Administered Medications Ordered in Other Encounters   Medication Dose Route Frequency Provider Last Rate Last Admin    methadone (DOLOPHINE) tablet 130 mg  130 mg Oral NOW Ari Ambrocio, APRN - NP           Social Determinants of Health:   Social Determinants of Health     Tobacco Use: Medium Risk (7/21/2023)    Patient History     Smoking

## 2024-04-04 ENCOUNTER — HOSPITAL ENCOUNTER (EMERGENCY)
Facility: HOSPITAL | Age: 29
Discharge: HOME | End: 2024-04-04
Payer: MEDICAID

## 2024-04-04 VITALS
HEART RATE: 89 BPM | OXYGEN SATURATION: 96 % | SYSTOLIC BLOOD PRESSURE: 136 MMHG | HEIGHT: 67 IN | RESPIRATION RATE: 18 BRPM | DIASTOLIC BLOOD PRESSURE: 88 MMHG | TEMPERATURE: 99 F | WEIGHT: 232 LBS | BODY MASS INDEX: 36.41 KG/M2

## 2024-04-04 DIAGNOSIS — Z76.89 ENCOUNTER FOR MEDICATION ADMINISTRATION: Primary | ICD-10-CM

## 2024-04-04 PROCEDURE — 99283 EMERGENCY DEPT VISIT LOW MDM: CPT

## 2024-04-04 PROCEDURE — 6370000000 HC RX 637 (ALT 250 FOR IP): Performed by: NURSE PRACTITIONER

## 2024-04-04 RX ORDER — METHADONE HYDROCHLORIDE 10 MG/1
130 TABLET ORAL
Status: COMPLETED | OUTPATIENT
Start: 2024-04-04 | End: 2024-04-04

## 2024-04-04 RX ADMIN — METHADONE HYDROCHLORIDE 130 MG: 10 TABLET ORAL at 11:38

## 2024-04-04 ASSESSMENT — PAIN - FUNCTIONAL ASSESSMENT: PAIN_FUNCTIONAL_ASSESSMENT: NONE - DENIES PAIN

## 2024-04-04 ASSESSMENT — PAIN SCALES - GENERAL: PAINLEVEL_OUTOF10: 0

## 2024-04-04 NOTE — ED PROVIDER NOTES
[facility] EMERGENCY DEPT  EMERGENCY DEPARTMENT HISTORY AND PHYSICAL EXAM      Date: 2024  Patient Name: Juliette Saba  MRN: [id number]  YOB: 1995  Date of evaluation: 2024  Provider: TAYLER Pulido NP   Note Started: 11:27 AM EDT 24    HISTORY OF PRESENT ILLNESS     Chief Complaint   Patient presents with    Medication Dose       History Provided By: Patient    HPI: Juliette Saba is a 28 y.o. female with past medical history as listed below presents to the ER for missed methadone dose.  Patient had transportation issues and missed her dose.  Patient takes 130 mg daily.  Spoke with patient's counselor to verify that she missed a dose.    PAST MEDICAL HISTORY   Past Medical History:  Past Medical History:   Diagnosis Date    Anxiety     Depression     Diabetes (HCC)     GERD (gastroesophageal reflux disease)     Heavy menses     pt states off and on x 1 year    High cholesterol     HSV-2 infection     Hypertension     Vaginal yeast infection     Vaginitis and vulvovaginitis        Past Surgical History:  Past Surgical History:   Procedure Laterality Date     SECTION      LEEP N/A 2023    LOOP ELECTROCAUTERY EXCISION PROCEDURE performed by Johan Christianson Jr., MD at [facility] MAIN OR       Family History:  Family History   Problem Relation Age of Onset    No Known Problems Mother     Diabetes Father        Social History:  Social History     Tobacco Use    Smoking status: Former     Current packs/day: 0.50     Types: Cigarettes    Smokeless tobacco: Never   Vaping Use    Vaping Use: Never used   Substance Use Topics    Alcohol use: Not Currently    Drug use: Not Currently       Allergies:  No Known Allergies    PCP: Apolinar Akhtar MD    Current Meds:   Current Facility-Administered Medications   Medication Dose Route Frequency Provider Last Rate Last Admin    methadone (DOLOPHINE) tablet 130 mg  130 mg Oral NOW Ari Ambrocio APRN - NP         Current Outpatient

## 2024-04-04 NOTE — ED TRIAGE NOTES
Missed methadone dose this AM, encountered some issues with her transportation to appointment this AM. Dose 130 mgs

## 2024-04-12 ENCOUNTER — HOSPITAL ENCOUNTER (EMERGENCY)
Facility: HOSPITAL | Age: 29
Discharge: HOME | End: 2024-04-12
Payer: MEDICAID

## 2024-04-12 VITALS
RESPIRATION RATE: 18 BRPM | OXYGEN SATURATION: 100 % | HEIGHT: 67 IN | DIASTOLIC BLOOD PRESSURE: 77 MMHG | HEART RATE: 70 BPM | BODY MASS INDEX: 36.1 KG/M2 | WEIGHT: 230 LBS | SYSTOLIC BLOOD PRESSURE: 121 MMHG | TEMPERATURE: 98.1 F

## 2024-04-12 DIAGNOSIS — Z76.89 ENCOUNTER FOR MEDICATION ADMINISTRATION: Primary | ICD-10-CM

## 2024-04-12 PROCEDURE — 6370000000 HC RX 637 (ALT 250 FOR IP)

## 2024-04-12 PROCEDURE — 99283 EMERGENCY DEPT VISIT LOW MDM: CPT

## 2024-04-12 RX ORDER — METHADONE HYDROCHLORIDE 10 MG/1
140 TABLET ORAL ONCE
Status: COMPLETED | OUTPATIENT
Start: 2024-04-12 | End: 2024-04-12

## 2024-04-12 RX ADMIN — METHADONE HYDROCHLORIDE 140 MG: 10 TABLET ORAL at 10:05

## 2024-04-12 ASSESSMENT — PAIN SCALES - GENERAL: PAINLEVEL_OUTOF10: 0

## 2024-04-12 ASSESSMENT — PAIN - FUNCTIONAL ASSESSMENT: PAIN_FUNCTIONAL_ASSESSMENT: NONE - DENIES PAIN

## 2024-04-12 NOTE — ED PROVIDER NOTES
[facility] EMERGENCY DEPT  EMERGENCY DEPARTMENT HISTORY AND PHYSICAL EXAM      Date: 2024  Patient Name: Juliette Saba  MRN: [id number]  YOB: 1995  Date of evaluation: 2024  Provider: Antonietta Beach PA-C   Note Started: 9:13 AM EDT 24    HISTORY OF PRESENT ILLNESS     Chief Complaint   Patient presents with    Withdrawal       History Provided By: Patient    HPI: Juliette Saba is a 28 y.o. female with past medical history listed below, presents for methadone administration.  Patient states that she missed her morning dose due to transportation issues.  I did speak with patient's counselor, Chloe, to verify that patient was unable to get her dose there today she was instructed to come here and her dosage is 140 mg.    PAST MEDICAL HISTORY   Past Medical History:  Past Medical History:   Diagnosis Date    Anxiety     Depression     Diabetes (HCC)     GERD (gastroesophageal reflux disease)     Heavy menses     pt states off and on x 1 year    High cholesterol     HSV-2 infection     Hypertension     Vaginal yeast infection     Vaginitis and vulvovaginitis        Past Surgical History:  Past Surgical History:   Procedure Laterality Date     SECTION      LEEP N/A 2023    LOOP ELECTROCAUTERY EXCISION PROCEDURE performed by Johan Christianson Jr., MD at [facility] MAIN OR       Family History:  Family History   Problem Relation Age of Onset    No Known Problems Mother     Diabetes Father        Social History:  Social History     Tobacco Use    Smoking status: Former     Current packs/day: 0.50     Types: Cigarettes    Smokeless tobacco: Never   Vaping Use    Vaping Use: Never used   Substance Use Topics    Alcohol use: Not Currently    Drug use: Not Currently       Allergies:  No Known Allergies    PCP: Apolinar Akhtar MD    Current Meds:   No current facility-administered medications for this encounter.     Current Outpatient Medications   Medication Sig Dispense Refill     Differential and Considerations:     Patient presents for methadone administration.  She has been having difficulty with regards to her appointment and was directed here for administration.  She gets it every 6 days.  I did speak with patient's counselor, Chloe verified that she had not gotten her dose and that her dosage was 140 mg.  Will provide dose to patient and discharge home.    Records Reviewed (source and summary of external notes): Prior medical records and Nursing notes    Vitals:    Vitals:    04/12/24 0856   BP: 121/77   Pulse: 70   Resp: 18   Temp: 98.1 °F (36.7 °C)   TempSrc: Oral   SpO2: 100%   Weight: 104.3 kg (230 lb)   Height: 1.702 m (5' 7\")        ED COURSE       SEPSIS Reassessment: Sepsis reassessment not applicable    Clinical Management Tools:  Not Applicable    Patient was given the following medications:  Medications   methadone (DOLOPHINE) tablet 140 mg (140 mg Oral Given 4/12/24 1005)       CONSULTS: See ED Course/MDM for further details.  None     Social Determinants affecting Diagnosis/Treatment: None    Smoking Cessation: Not Applicable    PROCEDURES   Unless otherwise noted above, none.  Procedures      CRITICAL CARE TIME   Patient does not meet Critical Care Time, 0 minutes    ED IMPRESSION     1. Encounter for medication administration          DISPOSITION/PLAN   DISPOSITION Decision To Discharge 04/12/2024 09:52:48 AM    Discharge Note: The patient is stable for discharge home. The signs, symptoms, diagnosis, and discharge instructions have been discussed, understanding conveyed, and agreed upon. The patient is to follow up as recommended or return to ER should their symptoms worsen.      PATIENT REFERRED TO:  Apolinar Akhtar MD  [address]  [address]  [place] [address] [phone]              DISCHARGE MEDICATIONS:     Medication List        ASK your doctor about these medications      acetaminophen 500 MG tablet  Commonly known as: TYLENOL  Take 2 tablets by

## 2024-04-12 NOTE — ED TRIAGE NOTES
Pt gets methadone every 6 days and you missed today due to transportation issues. Pt trying to relocated to [place] and that has become an issue. Pt endorses feeling sweaty and anxious at this time

## 2024-04-22 ENCOUNTER — HOSPITAL ENCOUNTER (EMERGENCY)
Facility: HOSPITAL | Age: 29
Discharge: HOME | End: 2024-04-22
Payer: MEDICAID

## 2024-04-22 VITALS
HEART RATE: 81 BPM | DIASTOLIC BLOOD PRESSURE: 64 MMHG | WEIGHT: 220 LBS | TEMPERATURE: 97.9 F | HEIGHT: 67 IN | BODY MASS INDEX: 34.53 KG/M2 | RESPIRATION RATE: 16 BRPM | SYSTOLIC BLOOD PRESSURE: 121 MMHG | OXYGEN SATURATION: 100 %

## 2024-04-22 DIAGNOSIS — Z76.89 ENCOUNTER FOR MEDICATION ADMINISTRATION: Primary | ICD-10-CM

## 2024-04-22 PROCEDURE — 6370000000 HC RX 637 (ALT 250 FOR IP)

## 2024-04-22 PROCEDURE — 99283 EMERGENCY DEPT VISIT LOW MDM: CPT

## 2024-04-22 RX ORDER — METHADONE HYDROCHLORIDE 10 MG/1
140 TABLET ORAL
Status: COMPLETED | OUTPATIENT
Start: 2024-04-22 | End: 2024-04-22

## 2024-04-22 RX ADMIN — METHADONE HYDROCHLORIDE 140 MG: 10 TABLET ORAL at 14:20

## 2024-04-22 ASSESSMENT — PAIN - FUNCTIONAL ASSESSMENT: PAIN_FUNCTIONAL_ASSESSMENT: NONE - DENIES PAIN

## 2024-04-22 NOTE — ED PROVIDER NOTES
[facility] EMERGENCY DEPT  EMERGENCY DEPARTMENT HISTORY AND PHYSICAL EXAM      Date: 2024  Patient Name: Juliette Saba  MRN: [id number]  YOB: 1995  Date of evaluation: 2024  Provider: Jese Gould PA-C   Note Started: 1:16 PM EDT 24    HISTORY OF PRESENT ILLNESS     Chief Complaint   Patient presents with    Missed Methadone Pill       History Provided By: Patient    HPI: Juliette Saba is a 28 y.o. female past medical as listed below presents emerged from today with complaint of medication administration.  Reports she missed her dose of methadone and did not go to clinic due to transportation concerns.  Reports to ED in past for similar complaint.  No evidence of withdrawal.  No chest pain shortness breath abdominal pain nausea vomiting cough cold congestion fever chills.  Overall feels well    PAST MEDICAL HISTORY   Past Medical History:  Past Medical History:   Diagnosis Date    Anxiety     Depression     Diabetes (HCC)     GERD (gastroesophageal reflux disease)     Heavy menses     pt states off and on x 1 year    High cholesterol     HSV-2 infection     Hypertension     Vaginal yeast infection     Vaginitis and vulvovaginitis        Past Surgical History:  Past Surgical History:   Procedure Laterality Date     SECTION      LEEP N/A 2023    LOOP ELECTROCAUTERY EXCISION PROCEDURE performed by Johan Christianson Jr., MD at [facility] MAIN OR       Family History:  Family History   Problem Relation Age of Onset    No Known Problems Mother     Diabetes Father        Social History:  Social History     Tobacco Use    Smoking status: Former     Current packs/day: 0.50     Types: Cigarettes    Smokeless tobacco: Never   Vaping Use    Vaping Use: Never used   Substance Use Topics    Alcohol use: Not Currently    Drug use: Not Currently       Allergies:  No Known Allergies    PCP: Apolinar Akhtar MD    Current Meds:   No current facility-administered medications for this encounter.

## 2024-04-25 ENCOUNTER — HOSPITAL ENCOUNTER (EMERGENCY)
Facility: HOSPITAL | Age: 29
Discharge: HOME | End: 2024-04-25
Payer: MEDICAID

## 2024-04-25 VITALS
DIASTOLIC BLOOD PRESSURE: 84 MMHG | HEIGHT: 67 IN | TEMPERATURE: 98.6 F | HEART RATE: 68 BPM | OXYGEN SATURATION: 100 % | SYSTOLIC BLOOD PRESSURE: 98 MMHG | WEIGHT: 220 LBS | BODY MASS INDEX: 34.53 KG/M2 | RESPIRATION RATE: 16 BRPM

## 2024-04-25 DIAGNOSIS — Z76.89 ENCOUNTER FOR MEDICATION ADMINISTRATION: Primary | ICD-10-CM

## 2024-04-25 PROCEDURE — 99283 EMERGENCY DEPT VISIT LOW MDM: CPT

## 2024-04-25 PROCEDURE — 6370000000 HC RX 637 (ALT 250 FOR IP): Performed by: NURSE PRACTITIONER

## 2024-04-25 RX ORDER — METHADONE HYDROCHLORIDE 10 MG/1
130 TABLET ORAL
Status: DISCONTINUED | OUTPATIENT
Start: 2024-04-25 | End: 2024-04-25

## 2024-04-25 RX ORDER — METHADONE HYDROCHLORIDE 10 MG/1
140 TABLET ORAL
Status: COMPLETED | OUTPATIENT
Start: 2024-04-25 | End: 2024-04-25

## 2024-04-25 RX ADMIN — METHADONE HYDROCHLORIDE 140 MG: 10 TABLET ORAL at 10:29

## 2024-04-25 ASSESSMENT — PAIN SCALES - GENERAL
PAINLEVEL_OUTOF10: 0
PAINLEVEL_OUTOF10: 0

## 2024-04-25 ASSESSMENT — PAIN - FUNCTIONAL ASSESSMENT: PAIN_FUNCTIONAL_ASSESSMENT: 0-10

## 2024-04-25 NOTE — ED PROVIDER NOTES
[facility] EMERGENCY DEPT  EMERGENCY DEPARTMENT HISTORY AND PHYSICAL EXAM      Date: 2024  Patient Name: Juliette Saba  MRN: [id number]  YOB: 1995  Date of evaluation: 2024  Provider: TAYLER Pulido NP   Note Started: 9:54 AM EDT 24    HISTORY OF PRESENT ILLNESS     Chief Complaint   Patient presents with    Medication Refill       History Provided By: Patient    HPI: Juliette Saba is a 28 y.o. female with a past medical history as listed below presents to the ER for methadone dose.  Patient is on chronic methadone.  Patient missed her dose today.  Patient comes in we can administer the dose.    PAST MEDICAL HISTORY   Past Medical History:  Past Medical History:   Diagnosis Date    Anxiety     Depression     Diabetes (HCC)     GERD (gastroesophageal reflux disease)     Heavy menses     pt states off and on x 1 year    High cholesterol     HSV-2 infection     Hypertension     Vaginal yeast infection     Vaginitis and vulvovaginitis        Past Surgical History:  Past Surgical History:   Procedure Laterality Date     SECTION      LEEP N/A 2023    LOOP ELECTROCAUTERY EXCISION PROCEDURE performed by Johan Christianson Jr., MD at [facility] MAIN OR       Family History:  Family History   Problem Relation Age of Onset    No Known Problems Mother     Diabetes Father        Social History:  Social History     Tobacco Use    Smoking status: Former     Current packs/day: 0.50     Types: Cigarettes    Smokeless tobacco: Never   Vaping Use    Vaping Use: Never used   Substance Use Topics    Alcohol use: Not Currently    Drug use: Not Currently       Allergies:  No Known Allergies    PCP: Apolinar Akhtar MD    Current Meds:   No current facility-administered medications for this encounter.     Current Outpatient Medications   Medication Sig Dispense Refill    ketorolac (TORADOL) 10 MG tablet Take 1 tablet by mouth every 6 hours as needed for Pain 20 tablet 0    lidocaine viscous hcl  Drinking: Never   Financial Resource Strain: Not on file   Food Insecurity: Not on file   Transportation Needs: Not on file   Physical Activity: Not on file   Stress: Not on file   Social Connections: Not on file   Intimate Partner Violence: Not on file   Depression: Not on file   Housing Stability: Not on file   Interpersonal Safety: Not At Risk (4/26/2024)    Interpersonal Safety Domain Source: IP Abuse Screening     Physical abuse: Denies     Verbal abuse: Denies     Emotional abuse: Denies     Financial abuse: Denies     Sexual abuse: Denies   Utilities: Not on file       PHYSICAL EXAM   Physical Exam  Constitutional:       General: She is not in acute distress.     Appearance: She is well-developed and normal weight. She is not ill-appearing or toxic-appearing.   HENT:      Head: Normocephalic and atraumatic.      Right Ear: Tympanic membrane and ear canal normal.      Left Ear: Tympanic membrane and ear canal normal.      Mouth/Throat:      Mouth: Mucous membranes are moist.      Pharynx: Uvula midline.   Eyes:      Conjunctiva/sclera: Conjunctivae normal.      Pupils: Pupils are equal, round, and reactive to light.   Cardiovascular:      Rate and Rhythm: Normal rate and regular rhythm.      Heart sounds: Normal heart sounds.   Pulmonary:      Effort: Pulmonary effort is normal.   Abdominal:      Palpations: Abdomen is soft.   Musculoskeletal:      Cervical back: Normal range of motion.   Skin:     General: Skin is warm and dry.   Neurological:      General: No focal deficit present.      Mental Status: She is alert and oriented to person, place, and time.   Psychiatric:         Mood and Affect: Mood normal.           SCREENINGS                  LAB, EKG AND DIAGNOSTIC RESULTS   Labs:  No results found for this or any previous visit (from the past 12 hour(s)).    EKG: Not Applicable    Radiologic Studies:  Non-plain film images such as CT, Ultrasound and MRI are read by the radiologist. Plain radiographic

## 2024-04-26 ENCOUNTER — HOSPITAL ENCOUNTER (EMERGENCY)
Facility: HOSPITAL | Age: 29
Discharge: HOME | End: 2024-04-26
Payer: MEDICAID

## 2024-04-26 VITALS
HEART RATE: 76 BPM | RESPIRATION RATE: 18 BRPM | DIASTOLIC BLOOD PRESSURE: 63 MMHG | HEIGHT: 67 IN | BODY MASS INDEX: 36.1 KG/M2 | OXYGEN SATURATION: 98 % | TEMPERATURE: 99.3 F | SYSTOLIC BLOOD PRESSURE: 119 MMHG | WEIGHT: 230 LBS

## 2024-04-26 DIAGNOSIS — F19.11 HISTORY OF SUBSTANCE ABUSE (HCC): Primary | ICD-10-CM

## 2024-04-26 PROCEDURE — 6370000000 HC RX 637 (ALT 250 FOR IP)

## 2024-04-26 PROCEDURE — 99283 EMERGENCY DEPT VISIT LOW MDM: CPT

## 2024-04-26 RX ORDER — METHADONE HYDROCHLORIDE 10 MG/1
140 TABLET ORAL
Status: COMPLETED | OUTPATIENT
Start: 2024-04-26 | End: 2024-04-26

## 2024-04-26 RX ADMIN — METHADONE HYDROCHLORIDE 140 MG: 10 TABLET ORAL at 12:37

## 2024-04-26 NOTE — ED PROVIDER NOTES
[facility] EMERGENCY DEPT  EMERGENCY DEPARTMENT HISTORY AND PHYSICAL EXAM      Date: 2024  Patient Name: Juliette Saba  MRN: [id number]  YOB: 1995  Date of evaluation: 2024  Provider: Matt Pérez PA-C   Note Started: 11:38 AM EDT 24    HISTORY OF PRESENT ILLNESS     Chief Complaint   Patient presents with    Medication Refill       History Provided By: Patient    HPI: Juliette Saba is a 28 y.o. female with past medical history as listed below presents emergency department reporting that she missed her methadone appointment today due to transportation issues.  Patient reports that she is sorting out her transportation as she is and is in the process of moving    PAST MEDICAL HISTORY   Past Medical History:  Past Medical History:   Diagnosis Date    Anxiety     Depression     Diabetes (HCC)     GERD (gastroesophageal reflux disease)     Heavy menses     pt states off and on x 1 year    High cholesterol     HSV-2 infection     Hypertension     Vaginal yeast infection     Vaginitis and vulvovaginitis        Past Surgical History:  Past Surgical History:   Procedure Laterality Date     SECTION      LEEP N/A 2023    LOOP ELECTROCAUTERY EXCISION PROCEDURE performed by Johan Christianson Jr., MD at [facility] MAIN OR       Family History:  Family History   Problem Relation Age of Onset    No Known Problems Mother     Diabetes Father        Social History:  Social History     Tobacco Use    Smoking status: Former     Current packs/day: 0.50     Types: Cigarettes    Smokeless tobacco: Never   Vaping Use    Vaping Use: Never used   Substance Use Topics    Alcohol use: Not Currently    Drug use: Not Currently       Allergies:  No Known Allergies    PCP: Apolinar Akhtar MD    Current Meds:   No current facility-administered medications for this encounter.     Current Outpatient Medications   Medication Sig Dispense Refill    ketorolac (TORADOL) 10 MG tablet Take 1 tablet by mouth every

## 2024-05-03 ENCOUNTER — HOSPITAL ENCOUNTER (EMERGENCY)
Facility: HOSPITAL | Age: 29
Discharge: LEFT BEFORE DISCHARGE | End: 2024-05-03
Payer: MEDICAID

## 2024-05-03 VITALS
RESPIRATION RATE: 18 BRPM | HEIGHT: 67 IN | HEART RATE: 83 BPM | WEIGHT: 230 LBS | BODY MASS INDEX: 36.1 KG/M2 | DIASTOLIC BLOOD PRESSURE: 58 MMHG | TEMPERATURE: 98.4 F | OXYGEN SATURATION: 98 % | SYSTOLIC BLOOD PRESSURE: 113 MMHG

## 2024-05-03 DIAGNOSIS — F11.20 METHADONE MAINTENANCE THERAPY PATIENT (HCC): ICD-10-CM

## 2024-05-03 DIAGNOSIS — Z76.89 ENCOUNTER FOR MEDICATION ADMINISTRATION: Primary | ICD-10-CM

## 2024-05-03 PROCEDURE — 99281 EMR DPT VST MAYX REQ PHY/QHP: CPT

## 2024-05-03 RX ORDER — METHADONE HYDROCHLORIDE 10 MG/1
140 TABLET ORAL ONCE
Status: DISCONTINUED | OUTPATIENT
Start: 2024-05-03 | End: 2024-05-03 | Stop reason: HOSPADM

## 2024-05-03 NOTE — ED PROVIDER NOTES
[facility] EMERGENCY DEPT  EMERGENCY DEPARTMENT HISTORY AND PHYSICAL EXAM      Date: 5/3/2024  Patient Name: Juliette Saba  MRN: [id number]  YOB: 1995  Date of evaluation: 5/3/2024  Provider: Antonietta Beach PA-C   Note Started: 6:40 PM EDT 5/3/24    HISTORY OF PRESENT ILLNESS     Chief Complaint   Patient presents with    Need Methadone       History Provided By: Patient    HPI: Juliette Saba is a 28 y.o. female with past medical history listed below, presents for methadone administration.  Patient states that she was unable to get ride to her methadone clinic.  She has not gotten her dose today.  She denies any additional symptoms at this time.    PAST MEDICAL HISTORY   Past Medical History:  Past Medical History:   Diagnosis Date    Anxiety     Depression     Diabetes (HCC)     GERD (gastroesophageal reflux disease)     Heavy menses     pt states off and on x 1 year    High cholesterol     HSV-2 infection     Hypertension     Vaginal yeast infection     Vaginitis and vulvovaginitis        Past Surgical History:  Past Surgical History:   Procedure Laterality Date     SECTION      LEEP N/A 2023    LOOP ELECTROCAUTERY EXCISION PROCEDURE performed by Johan Christianson Jr., MD at [facility] MAIN OR       Family History:  Family History   Problem Relation Age of Onset    No Known Problems Mother     Diabetes Father        Social History:  Social History     Tobacco Use    Smoking status: Former     Current packs/day: 0.50     Types: Cigarettes    Smokeless tobacco: Never   Vaping Use    Vaping Use: Never used   Substance Use Topics    Alcohol use: Not Currently    Drug use: Not Currently       Allergies:  No Known Allergies    PCP: Apolinar Akhtar MD    Current Meds:   No current facility-administered medications for this encounter.     Current Outpatient Medications   Medication Sig Dispense Refill    ketorolac (TORADOL) 10 MG tablet Take 1 tablet by mouth every 6 hours as needed for Pain 20  dose today and correct dosing amount.     Records Reviewed (source and summary of external notes): Prior medical records and Nursing notes    Vitals:    Vitals:    05/03/24 1747 05/03/24 1752   BP:  (!) 113/58   Pulse:  83   Resp:  18   Temp:  98.4 °F (36.9 °C)   TempSrc: Oral    SpO2:  98%   Weight: 104.3 kg (230 lb)    Height: 1.702 m (5' 7\")         ED COURSE  ED Course as of 05/04/24 0110   Fri May 03, 2024   1806 Waiting to get in touch with [facility] to verify dose.  [CL]   1839 Spoke with Chastity from [facility] who verified patient has not been seen there today and verified dose of 140mg [CL]   1925 I was notified by registration that the patient may have asked a friend to verify medication over the phone acting as her counselor. Will get phone number and attempt to reach personally.  [CL]   1935 I spoke with [facility] staff who will give me a call back directly to verify patients dosing.  [CL]   1950 I have spoken with pharmacy who is calling facility to verify dosing as well. Cannot medicate until verification.  [CL]   2013 No update on verification per Jackie from [facility].  [CL]   2036 Notified by nurse that patient left without medication as it is still waiting to be verified by pharmacy as they must contact the clinic directly.  [CL]      ED Course User Index  [CL] Antonietta Beach PA-C       SEPSIS Reassessment: Sepsis reassessment not applicable    Clinical Management Tools:  Not Applicable    Patient was given the following medications:  Medications - No data to display      CONSULTS: See ED Course/MDM for further details.  None     Social Determinants affecting Diagnosis/Treatment: None    Smoking Cessation: Not Applicable    PROCEDURES   Unless otherwise noted above, none.  Procedures      CRITICAL CARE TIME   Patient does not meet Critical Care Time, 0 minutes    ED IMPRESSION     1. Encounter for medication administration    2. Methadone maintenance therapy patient (HCC)

## 2024-05-10 ENCOUNTER — APPOINTMENT (OUTPATIENT)
Facility: HOSPITAL | Age: 29
End: 2024-05-10
Payer: MEDICAID

## 2024-05-10 ENCOUNTER — HOSPITAL ENCOUNTER (EMERGENCY)
Facility: HOSPITAL | Age: 29
Discharge: HOME | End: 2024-05-10
Attending: EMERGENCY MEDICINE
Payer: MEDICAID

## 2024-05-10 VITALS
BODY MASS INDEX: 36.1 KG/M2 | HEART RATE: 66 BPM | OXYGEN SATURATION: 99 % | WEIGHT: 230 LBS | DIASTOLIC BLOOD PRESSURE: 78 MMHG | HEIGHT: 67 IN | RESPIRATION RATE: 18 BRPM | SYSTOLIC BLOOD PRESSURE: 129 MMHG | TEMPERATURE: 98 F

## 2024-05-10 DIAGNOSIS — D25.9 UTERINE LEIOMYOMA, UNSPECIFIED LOCATION: ICD-10-CM

## 2024-05-10 DIAGNOSIS — N93.8 DUB (DYSFUNCTIONAL UTERINE BLEEDING): Primary | ICD-10-CM

## 2024-05-10 DIAGNOSIS — F11.90 METHADONE USE: ICD-10-CM

## 2024-05-10 LAB
APPEARANCE UR: CLEAR
BACTERIA URNS QL MICRO: NEGATIVE /HPF
BILIRUB UR QL: NEGATIVE
COLOR UR: ABNORMAL
EPITH CASTS URNS QL MICRO: ABNORMAL /LPF
GLUCOSE UR STRIP.AUTO-MCNC: >500 MG/DL
HCG UR QL: NEGATIVE
HGB UR QL STRIP: ABNORMAL
KETONES UR QL STRIP.AUTO: NEGATIVE MG/DL
LEUKOCYTE ESTERASE UR QL STRIP.AUTO: NEGATIVE
MUCOUS THREADS URNS QL MICRO: ABNORMAL /LPF
NITRITE UR QL STRIP.AUTO: NEGATIVE
PH UR STRIP: 6 (ref 5–8)
PROT UR STRIP-MCNC: NEGATIVE MG/DL
RBC #/AREA URNS HPF: ABNORMAL /HPF (ref 0–5)
SP GR UR REFRACTOMETRY: >1.03 (ref 1–1.03)
UROBILINOGEN UR QL STRIP.AUTO: 2 EU/DL (ref 0.1–1)
WBC URNS QL MICRO: ABNORMAL /HPF (ref 0–4)

## 2024-05-10 PROCEDURE — 76830 TRANSVAGINAL US NON-OB: CPT

## 2024-05-10 PROCEDURE — 96372 THER/PROPH/DIAG INJ SC/IM: CPT

## 2024-05-10 PROCEDURE — 6370000000 HC RX 637 (ALT 250 FOR IP): Performed by: EMERGENCY MEDICINE

## 2024-05-10 PROCEDURE — 6360000002 HC RX W HCPCS: Performed by: EMERGENCY MEDICINE

## 2024-05-10 PROCEDURE — 99284 EMERGENCY DEPT VISIT MOD MDM: CPT

## 2024-05-10 PROCEDURE — 81001 URINALYSIS AUTO W/SCOPE: CPT

## 2024-05-10 PROCEDURE — 81025 URINE PREGNANCY TEST: CPT

## 2024-05-10 RX ORDER — METHADONE HYDROCHLORIDE 10 MG/1
140 TABLET ORAL
Status: COMPLETED | OUTPATIENT
Start: 2024-05-10 | End: 2024-05-10

## 2024-05-10 RX ORDER — KETOROLAC TROMETHAMINE 30 MG/ML
60 INJECTION, SOLUTION INTRAMUSCULAR; INTRAVENOUS ONCE
Status: COMPLETED | OUTPATIENT
Start: 2024-05-10 | End: 2024-05-10

## 2024-05-10 RX ADMIN — METHADONE HYDROCHLORIDE 140 MG: 10 TABLET ORAL at 11:10

## 2024-05-10 RX ADMIN — KETOROLAC TROMETHAMINE 60 MG: 30 INJECTION, SOLUTION INTRAMUSCULAR at 09:59

## 2024-05-10 ASSESSMENT — LIFESTYLE VARIABLES
HOW OFTEN DO YOU HAVE A DRINK CONTAINING ALCOHOL: PATIENT DECLINED
HOW MANY STANDARD DRINKS CONTAINING ALCOHOL DO YOU HAVE ON A TYPICAL DAY: PATIENT DECLINED

## 2024-05-10 ASSESSMENT — PAIN SCALES - GENERAL: PAINLEVEL_OUTOF10: 10

## 2024-05-10 ASSESSMENT — PAIN - FUNCTIONAL ASSESSMENT: PAIN_FUNCTIONAL_ASSESSMENT: NONE - DENIES PAIN

## 2024-05-10 NOTE — PROGRESS NOTES
Methadone Verification  Methadone Clinic: [facility]  Medication: Methadone 140 every 24 hours  Last filled: 5/9/2024 (1-day supply)  Representative: Edgar (pharmacy)      Assessment/Plan:   Initiate 140 mg methadone daily for inpatient administration. Please call pharmacy ([phone]) if there are any questions or concerns.

## 2024-05-10 NOTE — ED PROVIDER NOTES
[facility] EMERGENCY DEPT  EMERGENCY DEPARTMENT HISTORY AND PHYSICAL EXAM      Date: 5/10/2024  Patient Name: Juliette Saba  MRN: [id number]  Birthdate 1995  Date of evaluation: 5/10/2024  Provider: Mee Payton MD   Note Started: 9:33 AM EDT 5/10/24    HISTORY OF PRESENT ILLNESS     Chief Complaint   Patient presents with    Vaginal Bleeding    Medication Refill    Letter for School/Work       History Provided By: Patient    HPI: Juliette Saba is a 29 y.o. female patient with 2 months of crampy abdominal pain heavy bleeding has an appointment with gynecology next week.  Has had a LEEP procedure performed about a year or 2 ago.  No fevers.  Intermittent abdominal pain.    PAST MEDICAL HISTORY   Past Medical History:  Past Medical History:   Diagnosis Date    Anxiety     Depression     Diabetes (HCC)     GERD (gastroesophageal reflux disease)     Heavy menses     pt states off and on x 1 year    High cholesterol     HSV-2 infection     Hypertension     Vaginal yeast infection     Vaginitis and vulvovaginitis        Past Surgical History:  Past Surgical History:   Procedure Laterality Date     SECTION      LEEP N/A 2023    LOOP ELECTROCAUTERY EXCISION PROCEDURE performed by Johan Christianson Jr., MD at [facility] MAIN OR       Family History:  Family History   Problem Relation Age of Onset    No Known Problems Mother     Diabetes Father        Social History:  Social History     Tobacco Use    Smoking status: Former     Current packs/day: 0.50     Types: Cigarettes    Smokeless tobacco: Never   Vaping Use    Vaping Use: Never used   Substance Use Topics    Alcohol use: Not Currently    Drug use: Not Currently       Allergies:  No Known Allergies    PCP: Apolinar Akhtar MD    Current Meds:   No current facility-administered medications for this encounter.     Current Outpatient Medications   Medication Sig Dispense Refill    ketorolac (TORADOL) 10 MG tablet Take 1 tablet by mouth every 6 hours  stable she is not dizzy.  Low concern for severe anemia needing blood transfusion.  Will obtain urine developed rule out a UTI also rule out a pregnancy.  Ultrasound to evaluate for possible uterine fibroids versus dysfunctional uterine bleeding.  Also consider endometriosis.  With patient requesting her methadone dose will have the pharmacy call to verify and treat pain as well as some Toradol she did ask for narcotic but I declined.    Records Reviewed (source and summary of external notes): Prior medical records and Nursing notes.    Vitals:    Vitals:    05/10/24 0915   BP: 129/78   Pulse: 66   Resp: 18   Temp: 98 °F (36.7 °C)   TempSrc: Oral   SpO2: 99%   Weight: 104.3 kg (230 lb)   Height: 1.702 m (5' 7\")        ED COURSE  ED Course as of 05/10/24 1141   Fri May 10, 2024   1009 Pregnancy, Urine: Negative  Lab work interpreted independently by ER physician as NORMAL   [HP]   1043 Urinalysis with Microscopic(!):    Color, UA Yellow/Straw   Appearance Clear   Specific Gravity, UA >1.030(!)   pH, Urine 6.0   Protein, UA Negative   Glucose, Ur >500(!)   Ketones, Urine Negative   Bilirubin, Urine Negative   Blood, Urine Large(!)   Urobilinogen 2.0(!)   Nitrite, Urine Negative   Leukocyte Esterase, Urine Negative   WBC, UA 0-4   RBC, UA 0-5   Epithelial Cells, UA Many(!)   Bacteria, UA Negative   Mucus, UA Trace(!)  Lab work interpreted independently by ER physician as NORMAL  No evidence of UTI blood is noted.  Probable contamination.  Will check her glucose. [HP]   1119 Patient has had her ultrasound performed for vaginal bleeding.  Does not want to stay to for the results.  Has an appointment with OB/GYN next week. [HP]      ED Course User Index  [HP] Mee Payton MD       SEPSIS Reassessment: Sepsis reassessment not applicable    Clinical Management Tools:  Not Applicable    Patient was given the following medications:  Medications   ketorolac (TORADOL) injection 60 mg (60 mg IntraMUSCular Given 5/10/24

## 2024-05-10 NOTE — ED TRIAGE NOTES
Patient here for 2 months of vaginal bleeding, work note and missed metadone 140 mg this am due to feeling sick. Her work is requiring a work note to return.

## 2024-05-13 ENCOUNTER — OFFICE VISIT (OUTPATIENT)
Age: 29
End: 2024-05-13
Payer: MEDICAID

## 2024-05-13 VITALS — BODY MASS INDEX: 36.88 KG/M2 | HEIGHT: 67 IN | WEIGHT: 235 LBS

## 2024-05-13 DIAGNOSIS — N92.0 MENORRHAGIA WITH REGULAR CYCLE: Primary | ICD-10-CM

## 2024-05-13 PROCEDURE — 99213 OFFICE O/P EST LOW 20 MIN: CPT | Performed by: OBSTETRICS & GYNECOLOGY

## 2024-05-13 RX ORDER — MEDROXYPROGESTERONE ACETATE 150 MG/ML
150 INJECTION, SUSPENSION INTRAMUSCULAR ONCE
Qty: 1 ML | Refills: 3 | Status: SHIPPED | OUTPATIENT
Start: 2024-05-13 | End: 2024-05-13

## 2024-05-14 ENCOUNTER — HOSPITAL ENCOUNTER (EMERGENCY)
Facility: HOSPITAL | Age: 29
Discharge: HOME | End: 2024-05-14
Payer: MEDICAID

## 2024-05-14 VITALS
OXYGEN SATURATION: 100 % | DIASTOLIC BLOOD PRESSURE: 87 MMHG | HEIGHT: 67 IN | TEMPERATURE: 99.5 F | WEIGHT: 235 LBS | HEART RATE: 75 BPM | BODY MASS INDEX: 36.88 KG/M2 | RESPIRATION RATE: 18 BRPM | SYSTOLIC BLOOD PRESSURE: 138 MMHG

## 2024-05-14 DIAGNOSIS — F11.20 ENCOUNTER FOR METHADONE MAINTENANCE THERAPY (HCC): Primary | ICD-10-CM

## 2024-05-14 PROCEDURE — 6370000000 HC RX 637 (ALT 250 FOR IP)

## 2024-05-14 PROCEDURE — 99283 EMERGENCY DEPT VISIT LOW MDM: CPT

## 2024-05-14 RX ADMIN — METHADONE HYDROCHLORIDE 145 MG: 10 TABLET ORAL at 12:55

## 2024-05-14 ASSESSMENT — PAIN - FUNCTIONAL ASSESSMENT: PAIN_FUNCTIONAL_ASSESSMENT: NONE - DENIES PAIN

## 2024-05-14 ASSESSMENT — PAIN SCALES - GENERAL: PAINLEVEL_OUTOF10: 0

## 2024-05-14 NOTE — ED TRIAGE NOTES
Reports that she missed her methadone visit due to transportation. States that her dose is 145 mg. [phone] - Clinic number for verification of dosage

## 2024-05-14 NOTE — DISCHARGE INSTRUCTIONS
Thank you for choosing our Emergency Department for your care.  It is our privilege to care for you in your time of need.  In the next several days, you may receive a survey via email or mailed to your home about your experience with our team.  We would greatly appreciate you taking a few minutes to complete the survey, as we use this information to learn what we have done well and what we could be doing better. Thank you for trusting us with your care!    Below you will find a list of your tests from today's visit.   Labs  No results found for this or any previous visit (from the past 12 hour(s)).    Radiologic Studies  No orders to display     ------------------------------------------------------------------------------------------------------------  The evaluation and treatment you received in the Emergency Department were for an urgent problem. It is important that you follow-up with a doctor, nurse practitioner, or physician assistant to:  (1) confirm your diagnosis,  (2) re-evaluation of changes in your illness and treatment, and (3) for ongoing care. Please take your discharge instructions with you when you go to your follow-up appointment.     If you have any problem arranging a follow-up appointment, contact us!  If your symptoms become worse or you do not improve as expected, please return to us. We are available 24 hours a day.     If a prescription has been provided, please fill it as soon as possible to prevent a delay in treatment. If you have any questions or reservations about taking the medication due to side effects or interactions with other medications, please call your primary care provider or contact us directly.  Again, THANK YOU for choosing us to care for YOU!

## 2024-05-15 NOTE — ED PROVIDER NOTES
[facility] EMERGENCY DEPT  EMERGENCY DEPARTMENT HISTORY AND PHYSICAL EXAM      Date: 2024  Patient Name: Juliette Saba  MRN: [id number]  YOB: 1995  Date of evaluation: 2024  Provider: Jese Gould PA-C   Note Started: 8:40 AM EDT 5/15/24    HISTORY OF PRESENT ILLNESS     Chief Complaint   Patient presents with    Methadone Dose       History Provided By: Patient    HPI: Juliette Saba is a 29 y.o. female presents today for dose of methadone.  Missed dose as she has transportation issues.  Has done this several times in the past.  No other complaints at this time.    PAST MEDICAL HISTORY   Past Medical History:  Past Medical History:   Diagnosis Date    Anxiety     Depression     Diabetes (HCC)     GERD (gastroesophageal reflux disease)     Heavy menses     pt states off and on x 1 year    High cholesterol     HSV-2 infection     Hypertension     Vaginal yeast infection     Vaginitis and vulvovaginitis        Past Surgical History:  Past Surgical History:   Procedure Laterality Date     SECTION      LEEP N/A 2023    LOOP ELECTROCAUTERY EXCISION PROCEDURE performed by Johan Christianson Jr., MD at [facility] MAIN OR       Family History:  Family History   Problem Relation Age of Onset    No Known Problems Mother     Diabetes Father        Social History:  Social History     Tobacco Use    Smoking status: Former     Current packs/day: 0.50     Types: Cigarettes    Smokeless tobacco: Never   Vaping Use    Vaping Use: Never used   Substance Use Topics    Alcohol use: Not Currently    Drug use: Not Currently       Allergies:  No Known Allergies    PCP: Apolinar Akhtar MD    Current Meds:   No current facility-administered medications for this encounter.     Current Outpatient Medications   Medication Sig Dispense Refill    medroxyPROGESTERone (DEPO-PROVERA) 150 MG/ML injection Inject 1 mL into the muscle once for 1 dose 1 mL 3    ketorolac (TORADOL) 10 MG tablet Take 1 tablet by mouth

## 2024-05-27 NOTE — PROGRESS NOTES
Juliette Saba is a 29 y.o. female, No obstetric history on file., No LMP recorded., who presents today for the following:  Chief Complaint   Patient presents with   • Vaginal Bleeding     Patient c/o heavy bleeding and wants to get back on depo due to fibroids.        No Known Allergies    Current Outpatient Medications   Medication Sig Dispense Refill   • medroxyPROGESTERone (DEPO-PROVERA) 150 MG/ML injection Inject 1 mL into the muscle once for 1 dose 1 mL 3   • lidocaine viscous hcl (XYLOCAINE) 2 % SOLN solution Take 15 mLs by mouth every 3 hours as needed (Sore throat) Take 15 mLs by mouth every 3 hours as needed for dental pain 250 mL 0   • venlafaxine (EFFEXOR) 75 MG tablet Take 25 mg by mouth 3 times daily     • ketorolac (TORADOL) 10 MG tablet Take 1 tablet by mouth every 6 hours as needed for Pain 20 tablet 0   • acetaminophen (TYLENOL) 500 MG tablet Take 2 tablets by mouth every 6 hours as needed for Pain (Patient not taking: Reported on 5/13/2024) 40 tablet 0   • acyclovir (ZOVIRAX) 5 % ointment Apply topically 5 times daily. 15 g 1   • acyclovir (ZOVIRAX) 5 % ointment Apply topically every 3 hours. 15 g 1   • medroxyPROGESTERone (PROVERA) 10 MG tablet Take 1 tablet by mouth daily 10 tablet 0   • insulin lispro (HUMALOG) 100 UNIT/ML injection cartridge Inject 8 Units into the skin 3 times daily (before meals) Sliding scale (Patient not taking: Reported on 5/13/2024)     • insulin glargine (LANTUS SOLOSTAR) 100 UNIT/ML injection pen Inject 20 Units into the skin daily (Patient not taking: Reported on 5/13/2024)     • Lidocaine 4 % GEL Apply twice daily as needed to labia (outer vaginal area) for irritation and pain. Do not apply with the clotrimazole at the same time.  Indications: skin irritation (Patient not taking: Reported on 5/9/2023)     • Lidocaine HCl 0.5 % GEL Apply topically 3 times daily (Patient not taking: Reported on 5/9/2023)     • medroxyPROGESTERone (DEPO-PROVERA) 150 MG/ML injection

## 2025-05-15 ENCOUNTER — HOSPITAL ENCOUNTER (EMERGENCY)
Facility: HOSPITAL | Age: 30
Discharge: HOME | End: 2025-05-15
Payer: MEDICAID

## 2025-05-15 VITALS
TEMPERATURE: 97.3 F | BODY MASS INDEX: 42.21 KG/M2 | RESPIRATION RATE: 18 BRPM | HEART RATE: 90 BPM | DIASTOLIC BLOOD PRESSURE: 92 MMHG | HEIGHT: 67 IN | SYSTOLIC BLOOD PRESSURE: 140 MMHG | OXYGEN SATURATION: 99 % | WEIGHT: 268.96 LBS

## 2025-05-15 DIAGNOSIS — F11.20 ENCOUNTER FOR METHADONE MAINTENANCE THERAPY (HCC): Primary | ICD-10-CM

## 2025-05-15 LAB — HCG UR QL: NEGATIVE

## 2025-05-15 PROCEDURE — 99283 EMERGENCY DEPT VISIT LOW MDM: CPT

## 2025-05-15 PROCEDURE — 81025 URINE PREGNANCY TEST: CPT

## 2025-05-15 PROCEDURE — 6370000000 HC RX 637 (ALT 250 FOR IP)

## 2025-05-15 RX ORDER — METHADONE HYDROCHLORIDE 10 MG/ML
170 CONCENTRATE ORAL ONCE
Refills: 0 | Status: COMPLETED | OUTPATIENT
Start: 2025-05-15 | End: 2025-05-15

## 2025-05-15 RX ADMIN — METHADONE HYDROCHLORIDE 170 MG: 10 CONCENTRATE ORAL at 10:48

## 2025-05-15 ASSESSMENT — PAIN SCALES - GENERAL: PAINLEVEL_OUTOF10: 10

## 2025-05-15 ASSESSMENT — PAIN DESCRIPTION - PAIN TYPE: TYPE: ACUTE PAIN

## 2025-05-15 ASSESSMENT — PAIN DESCRIPTION - ORIENTATION: ORIENTATION: OTHER (COMMENT)

## 2025-05-15 ASSESSMENT — PAIN DESCRIPTION - LOCATION: LOCATION: GENERALIZED

## 2025-05-15 ASSESSMENT — PAIN DESCRIPTION - ONSET: ONSET: ON-GOING

## 2025-05-15 ASSESSMENT — PAIN DESCRIPTION - FREQUENCY: FREQUENCY: CONTINUOUS

## 2025-05-15 ASSESSMENT — PAIN DESCRIPTION - DESCRIPTORS: DESCRIPTORS: ACHING

## 2025-05-15 ASSESSMENT — PAIN - FUNCTIONAL ASSESSMENT: PAIN_FUNCTIONAL_ASSESSMENT: PREVENTS OR INTERFERES SOME ACTIVE ACTIVITIES AND ADLS

## 2025-05-15 NOTE — PROGRESS NOTES
Methadone dose verification:  [facility] clinic contacted by ED provider Farida Castillo PA-C  Methadone dose verified per Trice Woods, PharmD at [facility]  Last dose liquid methadone 170 mg on 5/14/25 @ 5AM

## 2025-05-15 NOTE — ED NOTES
Pt presents to ED complaining of generalized body aches and a missed methadone dose this morning due to issues with transportation. Pt also reports she thinks she may be pregnant because her menstrual cycle this month was shorter than usual. Pt denies any other sx at this time. Pt is alert and oriented x 4, RR even and unlabored, skin is warm and dry. Pt appears in NAD at this time. Assessment completed and pt updated on plan of care.  Call bell in reach.    Emergency Department Nursing Plan of Care  The Nursing Plan of Care is developed from the Nursing assessment and Emergency Department Attending provider initial evaluation.  The plan of care may be reviewed in the “ED Provider note”.      The Plan of Care was developed with the following considerations:  Patient / Family readiness to learn indicated by:Refer to Medical chart in [facility]  Persons(s) to be included in education: Refer to Medical chart in [facility]  Barriers to Learning/Limitations:Normal     Signed    Criss Oswald RN  5/15/2025 10:11 AM

## 2025-05-15 NOTE — ED PROVIDER NOTES
[facility] EMERGENCY DEPARTMENT  EMERGENCY DEPARTMENT ENCOUNTER       Pt Name: Juliette Saba  MRN: [id number]  Birthdate 1995  Date of evaluation: 5/15/2025  Provider: Farida Castillo PA-C   PCP: Apolinar Akhtar MD  Note Started: 10:18 AM EDT 5/15/25     CHIEF COMPLAINT       Chief Complaint   Patient presents with    Medication Problem        HISTORY OF PRESENT ILLNESS: 1 or more elements      History From: Patient  HPI Limitations: None     Juliette Saba is a 30 y.o. female who presents to ED c/o missed methadone dose this morning. Patient states she receives methadone daily and last received it yesterday morning. Reports her daily dose is 170 mg. Endorses some BLE cramping which she states is typical when she misses methadone administration. Also reports she would like a pregnancy test today as her last menstrual period was shorter than usual, but occurred 1 week ago. No pelvic pain, abdominal pain, urinary symptoms, fever, chills, or any other complaints.      Nursing Notes were all reviewed and agreed with or any disagreements were addressed in the HPI.     REVIEW OF SYSTEMS      Review of Systems     Positives and Pertinent negatives as per HPI.    PAST HISTORY     Past Medical History:  Past Medical History:   Diagnosis Date    Anxiety     Depression     Diabetes (HCC)     GERD (gastroesophageal reflux disease)     Heavy menses     pt states off and on x 1 year    High cholesterol     HSV-2 infection     Hypertension     Vaginal yeast infection     Vaginitis and vulvovaginitis        Past Surgical History:  Past Surgical History:   Procedure Laterality Date     SECTION      LEEP N/A 2023    LOOP ELECTROCAUTERY EXCISION PROCEDURE performed by Johan Christianson Jr., MD at [facility] MAIN OR       Family History:  Family History   Problem Relation Age of Onset    No Known Problems Mother     Diabetes Father        Social History:  Social History     Tobacco Use    Smoking status:

## 2025-05-15 NOTE — ED TRIAGE NOTES
Pt c/o generalized body aches. Pt states she missed her methadone dose this morning d/t issues with transportation. Pt on phone during triage    Pt states she also thinks she might be pregnant but is unsure why she thinks this. Had menstrual cycle this month but \"it was short\". No other symptoms

## 2025-05-15 NOTE — ED NOTES
Discharge instructions were given to the patient by APOORVA Kahn.     The patient left the Emergency Department alert and oriented and in no acute distress with 0 prescriptions. The patient was encouraged to call or return to the ED for worsening issues or problems and was encouraged to schedule a follow up appointment for continuing care.     Ambulation assessment completed before discharge.  Pt left Emergency Department ambulating at baseline with no ortho devices  Ortho device education: none    The patient verbalized understanding of discharge instructions and prescriptions, all questions were answered. The patient has no further concerns at this time.

## 2025-05-15 NOTE — DISCHARGE INSTRUCTIONS
Please return to the emergency department if you develop worsening symptoms. Continue methadone usage as instructed by your provider.

## 2025-06-15 ENCOUNTER — HOSPITAL ENCOUNTER (EMERGENCY)
Facility: HOSPITAL | Age: 30
Discharge: HOME | DRG: 420 | End: 2025-06-15
Attending: STUDENT IN AN ORGANIZED HEALTH CARE EDUCATION/TRAINING PROGRAM
Payer: MEDICAID

## 2025-06-15 ENCOUNTER — APPOINTMENT (OUTPATIENT)
Facility: HOSPITAL | Age: 30
DRG: 420 | End: 2025-06-15
Payer: MEDICAID

## 2025-06-15 ENCOUNTER — HOSPITAL ENCOUNTER (INPATIENT)
Facility: HOSPITAL | Age: 30
LOS: 3 days | Discharge: HOME | DRG: 420 | End: 2025-06-18
Attending: EMERGENCY MEDICINE | Admitting: HOSPITALIST
Payer: MEDICAID

## 2025-06-15 VITALS
WEIGHT: 240 LBS | RESPIRATION RATE: 16 BRPM | BODY MASS INDEX: 37.67 KG/M2 | DIASTOLIC BLOOD PRESSURE: 87 MMHG | OXYGEN SATURATION: 100 % | HEART RATE: 69 BPM | SYSTOLIC BLOOD PRESSURE: 150 MMHG | TEMPERATURE: 98.2 F | HEIGHT: 67 IN

## 2025-06-15 DIAGNOSIS — E08.10 DIABETIC KETOACIDOSIS WITHOUT COMA ASSOCIATED WITH DIABETES MELLITUS DUE TO UNDERLYING CONDITION (HCC): Primary | ICD-10-CM

## 2025-06-15 DIAGNOSIS — R11.2 NAUSEA AND VOMITING, UNSPECIFIED VOMITING TYPE: ICD-10-CM

## 2025-06-15 DIAGNOSIS — E11.10 DKA, TYPE 2, NOT AT GOAL (HCC): Primary | ICD-10-CM

## 2025-06-15 DIAGNOSIS — E11.10 DIABETIC KETOACIDOSIS WITHOUT COMA ASSOCIATED WITH TYPE 2 DIABETES MELLITUS (HCC): ICD-10-CM

## 2025-06-15 LAB
ALBUMIN SERPL-MCNC: 4 G/DL (ref 3.5–5)
ALBUMIN SERPL-MCNC: 4.1 G/DL (ref 3.5–5)
ALBUMIN/GLOB SERPL: 0.9 (ref 1.1–2.2)
ALBUMIN/GLOB SERPL: 0.9 (ref 1.1–2.2)
ALP SERPL-CCNC: 171 U/L (ref 45–117)
ALP SERPL-CCNC: 177 U/L (ref 45–117)
ALT SERPL-CCNC: 27 U/L (ref 12–78)
ALT SERPL-CCNC: 31 U/L (ref 12–78)
ANION GAP SERPL CALC-SCNC: 20 MMOL/L (ref 2–12)
ANION GAP SERPL CALC-SCNC: 23 MMOL/L (ref 2–12)
AST SERPL-CCNC: 16 U/L (ref 15–37)
AST SERPL-CCNC: 20 U/L (ref 15–37)
BASE DEFICIT BLDV-SCNC: 14.9 MMOL/L
BASE DEFICIT BLDV-SCNC: 20.2 MMOL/L
BASOPHILS # BLD: 0.05 K/UL (ref 0–0.1)
BASOPHILS # BLD: 0.07 K/UL (ref 0–0.1)
BASOPHILS NFR BLD: 0.4 % (ref 0–1)
BASOPHILS NFR BLD: 0.5 % (ref 0–1)
BDY SITE: ABNORMAL
BDY SITE: ABNORMAL
BILIRUB SERPL-MCNC: 0.8 MG/DL (ref 0.2–1)
BILIRUB SERPL-MCNC: 0.8 MG/DL (ref 0.2–1)
BREATHS.SPONTANEOUS ON VENT: 16
BREATHS.SPONTANEOUS ON VENT: 17
BUN SERPL-MCNC: 6 MG/DL (ref 6–20)
BUN SERPL-MCNC: 9 MG/DL (ref 6–20)
BUN/CREAT SERPL: 10 (ref 12–20)
BUN/CREAT SERPL: 7 (ref 12–20)
CALCIUM SERPL-MCNC: 9.5 MG/DL (ref 8.5–10.1)
CALCIUM SERPL-MCNC: 9.8 MG/DL (ref 8.5–10.1)
CHLORIDE SERPL-SCNC: 95 MMOL/L (ref 97–108)
CHLORIDE SERPL-SCNC: 97 MMOL/L (ref 97–108)
CO2 SERPL-SCNC: 11 MMOL/L (ref 21–32)
CO2 SERPL-SCNC: 16 MMOL/L (ref 21–32)
CREAT SERPL-MCNC: 0.91 MG/DL (ref 0.55–1.02)
CREAT SERPL-MCNC: 0.94 MG/DL (ref 0.55–1.02)
DIFFERENTIAL METHOD BLD: ABNORMAL
DIFFERENTIAL METHOD BLD: ABNORMAL
EOSINOPHIL # BLD: 0 K/UL (ref 0–0.4)
EOSINOPHIL # BLD: 0 K/UL (ref 0–0.4)
EOSINOPHIL NFR BLD: 0 % (ref 0–7)
EOSINOPHIL NFR BLD: 0 % (ref 0–7)
ERYTHROCYTE [DISTWIDTH] IN BLOOD BY AUTOMATED COUNT: 13 % (ref 11.5–14.5)
ERYTHROCYTE [DISTWIDTH] IN BLOOD BY AUTOMATED COUNT: 13.3 % (ref 11.5–14.5)
EST. AVERAGE GLUCOSE BLD GHB EST-MCNC: ABNORMAL MG/DL
FLUAV RNA SPEC QL NAA+PROBE: NOT DETECTED
FLUBV RNA SPEC QL NAA+PROBE: NOT DETECTED
GLOBULIN SER CALC-MCNC: 4.6 G/DL (ref 2–4)
GLOBULIN SER CALC-MCNC: 4.8 G/DL (ref 2–4)
GLUCOSE BLD STRIP.AUTO-MCNC: 191 MG/DL (ref 65–117)
GLUCOSE BLD STRIP.AUTO-MCNC: 221 MG/DL (ref 65–117)
GLUCOSE BLD STRIP.AUTO-MCNC: 226 MG/DL (ref 65–117)
GLUCOSE BLD STRIP.AUTO-MCNC: 234 MG/DL (ref 65–117)
GLUCOSE BLD STRIP.AUTO-MCNC: 248 MG/DL (ref 65–117)
GLUCOSE BLD STRIP.AUTO-MCNC: 261 MG/DL (ref 65–117)
GLUCOSE BLD STRIP.AUTO-MCNC: 280 MG/DL (ref 65–117)
GLUCOSE BLD STRIP.AUTO-MCNC: 319 MG/DL (ref 65–117)
GLUCOSE SERPL-MCNC: 256 MG/DL (ref 65–100)
GLUCOSE SERPL-MCNC: 322 MG/DL (ref 65–100)
HBA1C MFR BLD: >14 % (ref 4–5.6)
HCO3 BLDV-SCNC: 11 MMOL/L (ref 23–28)
HCO3 BLDV-SCNC: 7 MMOL/L (ref 23–28)
HCT VFR BLD AUTO: 47.5 % (ref 35–47)
HCT VFR BLD AUTO: 51.1 % (ref 35–47)
HGB BLD-MCNC: 16.4 G/DL (ref 11.5–16)
HGB BLD-MCNC: 17.2 G/DL (ref 11.5–16)
IMM GRANULOCYTES # BLD AUTO: 0.07 K/UL (ref 0–0.04)
IMM GRANULOCYTES # BLD AUTO: 0.08 K/UL (ref 0–0.04)
IMM GRANULOCYTES NFR BLD AUTO: 0.5 % (ref 0–0.5)
IMM GRANULOCYTES NFR BLD AUTO: 0.6 % (ref 0–0.5)
LACTATE BLD-SCNC: 1.82 MMOL/L (ref 0.4–2)
LACTATE BLD-SCNC: 2 MMOL/L (ref 0.4–2)
LYMPHOCYTES # BLD: 2.29 K/UL (ref 0.8–3.5)
LYMPHOCYTES # BLD: 2.44 K/UL (ref 0.8–3.5)
LYMPHOCYTES NFR BLD: 16.2 % (ref 12–49)
LYMPHOCYTES NFR BLD: 18.6 % (ref 12–49)
MAGNESIUM SERPL-MCNC: 2 MG/DL (ref 1.6–2.4)
MCH RBC QN AUTO: 31 PG (ref 26–34)
MCH RBC QN AUTO: 31.1 PG (ref 26–34)
MCHC RBC AUTO-ENTMCNC: 33.7 G/DL (ref 30–36.5)
MCHC RBC AUTO-ENTMCNC: 34.5 G/DL (ref 30–36.5)
MCV RBC AUTO: 90 FL (ref 80–99)
MCV RBC AUTO: 92.2 FL (ref 80–99)
MONOCYTES # BLD: 0.58 K/UL (ref 0–1)
MONOCYTES # BLD: 0.73 K/UL (ref 0–1)
MONOCYTES NFR BLD: 4.7 % (ref 5–13)
MONOCYTES NFR BLD: 4.9 % (ref 5–13)
NEUTS SEG # BLD: 11.71 K/UL (ref 1.8–8)
NEUTS SEG # BLD: 9.34 K/UL (ref 1.8–8)
NEUTS SEG NFR BLD: 75.7 % (ref 32–75)
NEUTS SEG NFR BLD: 77.9 % (ref 32–75)
NRBC # BLD: 0 K/UL (ref 0–0.01)
NRBC # BLD: 0 K/UL (ref 0–0.01)
NRBC BLD-RTO: 0 PER 100 WBC
NRBC BLD-RTO: 0 PER 100 WBC
PCO2 BLDV: 22.6 MMHG (ref 41–51)
PCO2 BLDV: 28.7 MMHG (ref 41–51)
PH BLDV: 7.12 (ref 7.32–7.42)
PH BLDV: 7.22 (ref 7.32–7.42)
PLATELET # BLD AUTO: 233 K/UL (ref 150–400)
PLATELET # BLD AUTO: 263 K/UL (ref 150–400)
PMV BLD AUTO: 11.4 FL (ref 8.9–12.9)
PMV BLD AUTO: 12 FL (ref 8.9–12.9)
PO2 BLDV: 29 MMHG (ref 25–40)
PO2 BLDV: 31 MMHG (ref 25–40)
POTASSIUM SERPL-SCNC: 4.1 MMOL/L (ref 3.5–5.1)
POTASSIUM SERPL-SCNC: 4.3 MMOL/L (ref 3.5–5.1)
PROT SERPL-MCNC: 8.6 G/DL (ref 6.4–8.2)
PROT SERPL-MCNC: 8.9 G/DL (ref 6.4–8.2)
RBC # BLD AUTO: 5.28 M/UL (ref 3.8–5.2)
RBC # BLD AUTO: 5.54 M/UL (ref 3.8–5.2)
SAO2 % BLDV: 43 % (ref 65–88)
SAO2 % BLDV: 44 % (ref 65–88)
SAO2% DEVICE SAO2% SENSOR NAME: ABNORMAL
SAO2% DEVICE SAO2% SENSOR NAME: ABNORMAL
SARS-COV-2 RNA RESP QL NAA+PROBE: NOT DETECTED
SERVICE CMNT-IMP: ABNORMAL
SODIUM SERPL-SCNC: 131 MMOL/L (ref 136–145)
SODIUM SERPL-SCNC: 131 MMOL/L (ref 136–145)
SOURCE: NORMAL
SPECIMEN SITE: ABNORMAL
SPECIMEN SITE: ABNORMAL
WBC # BLD AUTO: 12.3 K/UL (ref 3.6–11)
WBC # BLD AUTO: 15 K/UL (ref 3.6–11)

## 2025-06-15 PROCEDURE — 96374 THER/PROPH/DIAG INJ IV PUSH: CPT

## 2025-06-15 PROCEDURE — 1200000000 HC SEMI PRIVATE

## 2025-06-15 PROCEDURE — 2500000003 HC RX 250 WO HCPCS: Performed by: HOSPITALIST

## 2025-06-15 PROCEDURE — 36415 COLL VENOUS BLD VENIPUNCTURE: CPT

## 2025-06-15 PROCEDURE — 83605 ASSAY OF LACTIC ACID: CPT

## 2025-06-15 PROCEDURE — 6360000002 HC RX W HCPCS: Performed by: STUDENT IN AN ORGANIZED HEALTH CARE EDUCATION/TRAINING PROGRAM

## 2025-06-15 PROCEDURE — 6370000000 HC RX 637 (ALT 250 FOR IP): Performed by: HOSPITALIST

## 2025-06-15 PROCEDURE — 99285 EMERGENCY DEPT VISIT HI MDM: CPT

## 2025-06-15 PROCEDURE — 6370000000 HC RX 637 (ALT 250 FOR IP): Performed by: STUDENT IN AN ORGANIZED HEALTH CARE EDUCATION/TRAINING PROGRAM

## 2025-06-15 PROCEDURE — 6360000004 HC RX CONTRAST MEDICATION: Performed by: STUDENT IN AN ORGANIZED HEALTH CARE EDUCATION/TRAINING PROGRAM

## 2025-06-15 PROCEDURE — 82803 BLOOD GASES ANY COMBINATION: CPT

## 2025-06-15 PROCEDURE — 85025 COMPLETE CBC W/AUTO DIFF WBC: CPT

## 2025-06-15 PROCEDURE — 83036 HEMOGLOBIN GLYCOSYLATED A1C: CPT

## 2025-06-15 PROCEDURE — 2580000003 HC RX 258: Performed by: STUDENT IN AN ORGANIZED HEALTH CARE EDUCATION/TRAINING PROGRAM

## 2025-06-15 PROCEDURE — 2580000003 HC RX 258: Performed by: NURSE PRACTITIONER

## 2025-06-15 PROCEDURE — 80053 COMPREHEN METABOLIC PANEL: CPT

## 2025-06-15 PROCEDURE — 80307 DRUG TEST PRSMV CHEM ANLYZR: CPT

## 2025-06-15 PROCEDURE — 6360000002 HC RX W HCPCS: Performed by: HOSPITALIST

## 2025-06-15 PROCEDURE — 82962 GLUCOSE BLOOD TEST: CPT

## 2025-06-15 PROCEDURE — 83735 ASSAY OF MAGNESIUM: CPT

## 2025-06-15 PROCEDURE — 6370000000 HC RX 637 (ALT 250 FOR IP): Performed by: NURSE PRACTITIONER

## 2025-06-15 PROCEDURE — 6360000002 HC RX W HCPCS: Performed by: EMERGENCY MEDICINE

## 2025-06-15 PROCEDURE — 74177 CT ABD & PELVIS W/CONTRAST: CPT

## 2025-06-15 PROCEDURE — 81001 URINALYSIS AUTO W/SCOPE: CPT

## 2025-06-15 PROCEDURE — 87636 SARSCOV2 & INF A&B AMP PRB: CPT

## 2025-06-15 PROCEDURE — 6360000002 HC RX W HCPCS: Performed by: NURSE PRACTITIONER

## 2025-06-15 PROCEDURE — 96375 TX/PRO/DX INJ NEW DRUG ADDON: CPT

## 2025-06-15 RX ORDER — METHADONE HYDROCHLORIDE 10 MG/ML
170 CONCENTRATE ORAL DAILY
Status: DISCONTINUED | OUTPATIENT
Start: 2025-06-15 | End: 2025-06-17

## 2025-06-15 RX ORDER — IOPAMIDOL 755 MG/ML
100 INJECTION, SOLUTION INTRAVASCULAR
Status: COMPLETED | OUTPATIENT
Start: 2025-06-15 | End: 2025-06-15

## 2025-06-15 RX ORDER — 0.9 % SODIUM CHLORIDE 0.9 %
1000 INTRAVENOUS SOLUTION INTRAVENOUS ONCE
Status: COMPLETED | OUTPATIENT
Start: 2025-06-15 | End: 2025-06-15

## 2025-06-15 RX ORDER — KETOROLAC TROMETHAMINE 30 MG/ML
15 INJECTION, SOLUTION INTRAMUSCULAR; INTRAVENOUS ONCE
Status: COMPLETED | OUTPATIENT
Start: 2025-06-15 | End: 2025-06-15

## 2025-06-15 RX ORDER — METHADONE HYDROCHLORIDE 10 MG/ML
15 CONCENTRATE ORAL ONCE
Status: DISCONTINUED | OUTPATIENT
Start: 2025-06-16 | End: 2025-06-15

## 2025-06-15 RX ORDER — DEXTROSE MONOHYDRATE 100 MG/ML
INJECTION, SOLUTION INTRAVENOUS CONTINUOUS PRN
Status: DISCONTINUED | OUTPATIENT
Start: 2025-06-15 | End: 2025-06-18 | Stop reason: HOSPADM

## 2025-06-15 RX ORDER — MAGNESIUM SULFATE IN WATER 40 MG/ML
2000 INJECTION, SOLUTION INTRAVENOUS PRN
Status: DISCONTINUED | OUTPATIENT
Start: 2025-06-15 | End: 2025-06-18 | Stop reason: HOSPADM

## 2025-06-15 RX ORDER — POLYETHYLENE GLYCOL 3350 17 G/17G
17 POWDER, FOR SOLUTION ORAL DAILY PRN
Status: DISCONTINUED | OUTPATIENT
Start: 2025-06-15 | End: 2025-06-18 | Stop reason: HOSPADM

## 2025-06-15 RX ORDER — MORPHINE SULFATE 2 MG/ML
2 INJECTION, SOLUTION INTRAMUSCULAR; INTRAVENOUS
Refills: 0 | Status: COMPLETED | OUTPATIENT
Start: 2025-06-15 | End: 2025-06-15

## 2025-06-15 RX ORDER — DEXTROSE MONOHYDRATE, SODIUM CHLORIDE, AND POTASSIUM CHLORIDE 50; 1.49; 4.5 G/1000ML; G/1000ML; G/1000ML
INJECTION, SOLUTION INTRAVENOUS CONTINUOUS PRN
Status: DISCONTINUED | OUTPATIENT
Start: 2025-06-15 | End: 2025-06-16

## 2025-06-15 RX ORDER — ONDANSETRON 2 MG/ML
4 INJECTION INTRAMUSCULAR; INTRAVENOUS
Status: COMPLETED | OUTPATIENT
Start: 2025-06-15 | End: 2025-06-15

## 2025-06-15 RX ORDER — MAGNESIUM SULFATE IN WATER 40 MG/ML
2000 INJECTION, SOLUTION INTRAVENOUS PRN
Status: DISCONTINUED | OUTPATIENT
Start: 2025-06-15 | End: 2025-06-15 | Stop reason: HOSPADM

## 2025-06-15 RX ORDER — METHADONE HYDROCHLORIDE 10 MG/ML
30 CONCENTRATE ORAL ONCE
Status: DISCONTINUED | OUTPATIENT
Start: 2025-06-15 | End: 2025-06-15

## 2025-06-15 RX ORDER — PROCHLORPERAZINE EDISYLATE 5 MG/ML
10 INJECTION INTRAMUSCULAR; INTRAVENOUS EVERY 6 HOURS PRN
Status: DISCONTINUED | OUTPATIENT
Start: 2025-06-15 | End: 2025-06-15 | Stop reason: HOSPADM

## 2025-06-15 RX ORDER — SODIUM CHLORIDE 9 MG/ML
INJECTION, SOLUTION INTRAVENOUS CONTINUOUS
Status: DISCONTINUED | OUTPATIENT
Start: 2025-06-15 | End: 2025-06-15 | Stop reason: HOSPADM

## 2025-06-15 RX ORDER — ONDANSETRON 2 MG/ML
4 INJECTION INTRAMUSCULAR; INTRAVENOUS EVERY 6 HOURS PRN
Status: DISCONTINUED | OUTPATIENT
Start: 2025-06-15 | End: 2025-06-15

## 2025-06-15 RX ORDER — DEXTROSE MONOHYDRATE AND SODIUM CHLORIDE 5; .45 G/100ML; G/100ML
INJECTION, SOLUTION INTRAVENOUS CONTINUOUS PRN
Status: DISCONTINUED | OUTPATIENT
Start: 2025-06-15 | End: 2025-06-15 | Stop reason: HOSPADM

## 2025-06-15 RX ORDER — SODIUM CHLORIDE 9 MG/ML
INJECTION, SOLUTION INTRAVENOUS ONCE
Status: COMPLETED | OUTPATIENT
Start: 2025-06-15 | End: 2025-06-15

## 2025-06-15 RX ORDER — 0.9 % SODIUM CHLORIDE 0.9 %
1000 INTRAVENOUS SOLUTION INTRAVENOUS ONCE
Status: DISCONTINUED | OUTPATIENT
Start: 2025-06-15 | End: 2025-06-15 | Stop reason: HOSPADM

## 2025-06-15 RX ORDER — SODIUM CHLORIDE 9 MG/ML
INJECTION, SOLUTION INTRAVENOUS CONTINUOUS
Status: DISCONTINUED | OUTPATIENT
Start: 2025-06-15 | End: 2025-06-15

## 2025-06-15 RX ORDER — POTASSIUM CHLORIDE 7.45 MG/ML
10 INJECTION INTRAVENOUS PRN
Status: DISCONTINUED | OUTPATIENT
Start: 2025-06-15 | End: 2025-06-17

## 2025-06-15 RX ORDER — ENOXAPARIN SODIUM 100 MG/ML
30 INJECTION SUBCUTANEOUS 2 TIMES DAILY
Status: DISCONTINUED | OUTPATIENT
Start: 2025-06-15 | End: 2025-06-18 | Stop reason: HOSPADM

## 2025-06-15 RX ORDER — DROPERIDOL 2.5 MG/ML
1.25 INJECTION, SOLUTION INTRAMUSCULAR; INTRAVENOUS ONCE
Status: COMPLETED | OUTPATIENT
Start: 2025-06-15 | End: 2025-06-15

## 2025-06-15 RX ORDER — POTASSIUM CHLORIDE 7.45 MG/ML
10 INJECTION INTRAVENOUS PRN
Status: DISCONTINUED | OUTPATIENT
Start: 2025-06-15 | End: 2025-06-15 | Stop reason: HOSPADM

## 2025-06-15 RX ORDER — MORPHINE SULFATE 4 MG/ML
4 INJECTION, SOLUTION INTRAMUSCULAR; INTRAVENOUS
Status: COMPLETED | OUTPATIENT
Start: 2025-06-15 | End: 2025-06-15

## 2025-06-15 RX ORDER — DIPHENHYDRAMINE HYDROCHLORIDE 50 MG/ML
25 INJECTION, SOLUTION INTRAMUSCULAR; INTRAVENOUS
Status: COMPLETED | OUTPATIENT
Start: 2025-06-15 | End: 2025-06-15

## 2025-06-15 RX ADMIN — DROPERIDOL 1.25 MG: 2.5 INJECTION, SOLUTION INTRAMUSCULAR; INTRAVENOUS at 20:38

## 2025-06-15 RX ADMIN — DIPHENHYDRAMINE HYDROCHLORIDE 25 MG: 50 INJECTION INTRAMUSCULAR; INTRAVENOUS at 04:58

## 2025-06-15 RX ADMIN — SODIUM CHLORIDE 1000 ML: 0.9 INJECTION, SOLUTION INTRAVENOUS at 06:01

## 2025-06-15 RX ADMIN — ONDANSETRON 4 MG: 2 INJECTION, SOLUTION INTRAMUSCULAR; INTRAVENOUS at 19:51

## 2025-06-15 RX ADMIN — DEXTROSE, SODIUM CHLORIDE, AND POTASSIUM CHLORIDE: 5; .45; .15 INJECTION INTRAVENOUS at 22:52

## 2025-06-15 RX ADMIN — ENOXAPARIN SODIUM 30 MG: 100 INJECTION SUBCUTANEOUS at 23:21

## 2025-06-15 RX ADMIN — INSULIN HUMAN 4 UNITS/HR: 1 INJECTION, SOLUTION INTRAVENOUS at 20:53

## 2025-06-15 RX ADMIN — INSULIN HUMAN 6.6 UNITS/HR: 1 INJECTION, SOLUTION INTRAVENOUS at 07:10

## 2025-06-15 RX ADMIN — SODIUM CHLORIDE: 0.9 INJECTION, SOLUTION INTRAVENOUS at 20:39

## 2025-06-15 RX ADMIN — METHADONE HYDROCHLORIDE 170 MG: 10 CONCENTRATE ORAL at 23:56

## 2025-06-15 RX ADMIN — KETOROLAC TROMETHAMINE 15 MG: 30 INJECTION, SOLUTION INTRAMUSCULAR at 06:18

## 2025-06-15 RX ADMIN — PROCHLORPERAZINE EDISYLATE 10 MG: 5 INJECTION INTRAMUSCULAR; INTRAVENOUS at 04:59

## 2025-06-15 RX ADMIN — MORPHINE SULFATE 2 MG: 2 INJECTION, SOLUTION INTRAMUSCULAR; INTRAVENOUS at 08:33

## 2025-06-15 RX ADMIN — DROPERIDOL 1.25 MG: 2.5 INJECTION, SOLUTION INTRAMUSCULAR; INTRAVENOUS at 08:33

## 2025-06-15 RX ADMIN — MORPHINE SULFATE 4 MG: 4 INJECTION INTRAVENOUS at 06:30

## 2025-06-15 RX ADMIN — INSULIN HUMAN 5.2 UNITS/HR: 1 INJECTION, SOLUTION INTRAVENOUS at 06:12

## 2025-06-15 RX ADMIN — SODIUM CHLORIDE: 900 INJECTION, SOLUTION INTRAVENOUS at 07:55

## 2025-06-15 RX ADMIN — IOPAMIDOL 100 ML: 755 INJECTION, SOLUTION INTRAVENOUS at 09:43

## 2025-06-15 ASSESSMENT — PAIN DESCRIPTION - LOCATION
LOCATION: ABDOMEN

## 2025-06-15 ASSESSMENT — PAIN - FUNCTIONAL ASSESSMENT
PAIN_FUNCTIONAL_ASSESSMENT: 0-10
PAIN_FUNCTIONAL_ASSESSMENT: 0-10
PAIN_FUNCTIONAL_ASSESSMENT: PREVENTS OR INTERFERES SOME ACTIVE ACTIVITIES AND ADLS
PAIN_FUNCTIONAL_ASSESSMENT: PREVENTS OR INTERFERES SOME ACTIVE ACTIVITIES AND ADLS

## 2025-06-15 ASSESSMENT — PAIN SCALES - GENERAL
PAINLEVEL_OUTOF10: 10

## 2025-06-15 ASSESSMENT — PAIN DESCRIPTION - FREQUENCY
FREQUENCY: CONTINUOUS
FREQUENCY: CONTINUOUS

## 2025-06-15 ASSESSMENT — PAIN DESCRIPTION - PAIN TYPE: TYPE: ACUTE PAIN

## 2025-06-15 ASSESSMENT — PAIN DESCRIPTION - DESCRIPTORS
DESCRIPTORS: ACHING;DULL;SHARP
DESCRIPTORS: SHARP

## 2025-06-15 ASSESSMENT — PAIN DESCRIPTION - ORIENTATION: ORIENTATION: OTHER (COMMENT)

## 2025-06-15 NOTE — ED PROVIDER NOTES
[facility] EMERGENCY DEPARTMENT  EMERGENCY DEPARTMENT ENCOUNTER       Pt Name: Juliette Saba  MRN: [id number]  Birthdate 1995  Date of evaluation: 6/15/2025  Provider: Calvin Denson MD   PCP: Apolinar Akhtar MD  Note Started: 6:05 AM EDT 6/15/25     CHIEF COMPLAINT       Chief Complaint   Patient presents with    Nausea     Pt arrives with complaint of n/v x 1 day. Pt seen previously at [facility] medication center given fluids unsure if she was given RX for nausea medicine for home.   Pt reports not being able to take full methadone dose due to vomiting.         HISTORY OF PRESENT ILLNESS: 1 or more elements      History From: patient, History limited by: none     Juliette Saba is a 30 y.o. female presenting with vomiting.  This has been going on for a couple of days.  She has not been able to take her methadone because of the vomiting.  She went to [facility] yesterday and got fluids and felt better but got home and started vomiting again.  Notes some mild abdominal pain.  Notes vaginal itching and requesting fluconazole.  Endorses generalized body aches.         Please See MDM for Additional Details of the HPI/PMH  Nursing Notes were all reviewed and agreed with or any disagreements were addressed in the HPI.     REVIEW OF SYSTEMS        Positives and Pertinent negatives as per HPI.    PAST HISTORY     Past Medical History:  Past Medical History:   Diagnosis Date    Anxiety     Depression     Diabetes (HCC)     GERD (gastroesophageal reflux disease)     Heavy menses     pt states off and on x 1 year    High cholesterol     HSV-2 infection     Hypertension     Vaginal yeast infection     Vaginitis and vulvovaginitis        Past Surgical History:  Past Surgical History:   Procedure Laterality Date     SECTION      LEEP N/A 2023    LOOP ELECTROCAUTERY EXCISION PROCEDURE performed by Johan Christianson Jr., MD at [facility] MAIN OR       Family History:  Family History   Problem

## 2025-06-15 NOTE — PROGRESS NOTES
Patient continually asking to leave AMA.  Dr. Sanders at the bedside explaining the risks of discontinuing treatment. Patient awake and alert and continued to insist on leaving AMA.  AMA form signed by patient, Dr. Sanders and RN.

## 2025-06-15 NOTE — ED NOTES
RN to bedside, pt has half drunk cup of water, pt again advised that she is to have nothing to eat or drink by mouth.  Labs obtained, second line placed

## 2025-06-15 NOTE — ED NOTES
RN called to bedside, pt has vomited all over floor, bedside table and counter.  Pt advised by RN nothing more by mouth until nausea and vomiting is under control.  Pt methadone bottle empty. Pt states she took the remaining dose.  MD made aware and orders received.

## 2025-06-16 LAB
AMPHET UR QL SCN: NEGATIVE
ANION GAP SERPL CALC-SCNC: 13 MMOL/L (ref 2–12)
ANION GAP SERPL CALC-SCNC: 14 MMOL/L (ref 2–12)
ANION GAP SERPL CALC-SCNC: 14 MMOL/L (ref 2–12)
ANION GAP SERPL CALC-SCNC: 15 MMOL/L (ref 2–12)
ANION GAP SERPL CALC-SCNC: 15 MMOL/L (ref 2–12)
ANION GAP SERPL CALC-SCNC: 21 MMOL/L (ref 2–12)
ANION GAP SERPL CALC-SCNC: 9 MMOL/L (ref 2–12)
APPEARANCE UR: CLEAR
ARTERIAL PATENCY WRIST A: ABNORMAL
BACTERIA URNS QL MICRO: NEGATIVE /HPF
BARBITURATES UR QL SCN: NEGATIVE
BASE DEFICIT BLDA-SCNC: 11.7 MMOL/L
BDY SITE: ABNORMAL
BENZODIAZ UR QL: NEGATIVE
BILIRUB UR QL: NEGATIVE
BUN SERPL-MCNC: 3 MG/DL (ref 6–20)
BUN SERPL-MCNC: 3 MG/DL (ref 6–20)
BUN SERPL-MCNC: 5 MG/DL (ref 6–20)
BUN SERPL-MCNC: 6 MG/DL (ref 6–20)
BUN/CREAT SERPL: 4 (ref 12–20)
BUN/CREAT SERPL: 4 (ref 12–20)
BUN/CREAT SERPL: 6 (ref 12–20)
BUN/CREAT SERPL: 7 (ref 12–20)
BUN/CREAT SERPL: 8 (ref 12–20)
CALCIUM SERPL-MCNC: 8.5 MG/DL (ref 8.5–10.1)
CALCIUM SERPL-MCNC: 9 MG/DL (ref 8.5–10.1)
CALCIUM SERPL-MCNC: 9.1 MG/DL (ref 8.5–10.1)
CALCIUM SERPL-MCNC: 9.2 MG/DL (ref 8.5–10.1)
CALCIUM SERPL-MCNC: 9.3 MG/DL (ref 8.5–10.1)
CALCIUM SERPL-MCNC: 9.3 MG/DL (ref 8.5–10.1)
CALCIUM SERPL-MCNC: 9.6 MG/DL (ref 8.5–10.1)
CANNABINOIDS UR QL SCN: NEGATIVE
CHLORIDE SERPL-SCNC: 101 MMOL/L (ref 97–108)
CHLORIDE SERPL-SCNC: 102 MMOL/L (ref 97–108)
CHLORIDE SERPL-SCNC: 103 MMOL/L (ref 97–108)
CHLORIDE SERPL-SCNC: 104 MMOL/L (ref 97–108)
CHLORIDE SERPL-SCNC: 104 MMOL/L (ref 97–108)
CHLORIDE SERPL-SCNC: 107 MMOL/L (ref 97–108)
CHLORIDE SERPL-SCNC: 99 MMOL/L (ref 97–108)
CO2 SERPL-SCNC: 12 MMOL/L (ref 21–32)
CO2 SERPL-SCNC: 12 MMOL/L (ref 21–32)
CO2 SERPL-SCNC: 14 MMOL/L (ref 21–32)
CO2 SERPL-SCNC: 14 MMOL/L (ref 21–32)
CO2 SERPL-SCNC: 15 MMOL/L (ref 21–32)
CO2 SERPL-SCNC: 17 MMOL/L (ref 21–32)
CO2 SERPL-SCNC: 19 MMOL/L (ref 21–32)
COCAINE UR QL SCN: NEGATIVE
COLOR UR: ABNORMAL
CREAT SERPL-MCNC: 0.69 MG/DL (ref 0.55–1.02)
CREAT SERPL-MCNC: 0.73 MG/DL (ref 0.55–1.02)
CREAT SERPL-MCNC: 0.73 MG/DL (ref 0.55–1.02)
CREAT SERPL-MCNC: 0.77 MG/DL (ref 0.55–1.02)
CREAT SERPL-MCNC: 0.78 MG/DL (ref 0.55–1.02)
CREAT SERPL-MCNC: 0.88 MG/DL (ref 0.55–1.02)
CREAT SERPL-MCNC: 0.9 MG/DL (ref 0.55–1.02)
EPITH CASTS URNS QL MICRO: NORMAL /LPF
EST. AVERAGE GLUCOSE BLD GHB EST-MCNC: ABNORMAL MG/DL
EST. AVERAGE GLUCOSE BLD GHB EST-MCNC: ABNORMAL MG/DL
GLUCOSE BLD STRIP.AUTO-MCNC: 115 MG/DL (ref 65–117)
GLUCOSE BLD STRIP.AUTO-MCNC: 132 MG/DL (ref 65–117)
GLUCOSE BLD STRIP.AUTO-MCNC: 134 MG/DL (ref 65–117)
GLUCOSE BLD STRIP.AUTO-MCNC: 134 MG/DL (ref 65–117)
GLUCOSE BLD STRIP.AUTO-MCNC: 136 MG/DL (ref 65–117)
GLUCOSE BLD STRIP.AUTO-MCNC: 140 MG/DL (ref 65–117)
GLUCOSE BLD STRIP.AUTO-MCNC: 141 MG/DL (ref 65–117)
GLUCOSE BLD STRIP.AUTO-MCNC: 142 MG/DL (ref 65–117)
GLUCOSE BLD STRIP.AUTO-MCNC: 143 MG/DL (ref 65–117)
GLUCOSE BLD STRIP.AUTO-MCNC: 143 MG/DL (ref 65–117)
GLUCOSE BLD STRIP.AUTO-MCNC: 146 MG/DL (ref 65–117)
GLUCOSE BLD STRIP.AUTO-MCNC: 146 MG/DL (ref 65–117)
GLUCOSE BLD STRIP.AUTO-MCNC: 147 MG/DL (ref 65–117)
GLUCOSE BLD STRIP.AUTO-MCNC: 149 MG/DL (ref 65–117)
GLUCOSE BLD STRIP.AUTO-MCNC: 150 MG/DL (ref 65–117)
GLUCOSE BLD STRIP.AUTO-MCNC: 152 MG/DL (ref 65–117)
GLUCOSE BLD STRIP.AUTO-MCNC: 154 MG/DL (ref 65–117)
GLUCOSE BLD STRIP.AUTO-MCNC: 156 MG/DL (ref 65–117)
GLUCOSE BLD STRIP.AUTO-MCNC: 167 MG/DL (ref 65–117)
GLUCOSE BLD STRIP.AUTO-MCNC: 175 MG/DL (ref 65–117)
GLUCOSE BLD STRIP.AUTO-MCNC: 185 MG/DL (ref 65–117)
GLUCOSE BLD STRIP.AUTO-MCNC: 187 MG/DL (ref 65–117)
GLUCOSE BLD STRIP.AUTO-MCNC: 195 MG/DL (ref 65–117)
GLUCOSE BLD STRIP.AUTO-MCNC: 206 MG/DL (ref 65–117)
GLUCOSE SERPL-MCNC: 133 MG/DL (ref 65–100)
GLUCOSE SERPL-MCNC: 139 MG/DL (ref 65–100)
GLUCOSE SERPL-MCNC: 143 MG/DL (ref 65–100)
GLUCOSE SERPL-MCNC: 153 MG/DL (ref 65–100)
GLUCOSE SERPL-MCNC: 157 MG/DL (ref 65–100)
GLUCOSE SERPL-MCNC: 239 MG/DL (ref 65–100)
GLUCOSE SERPL-MCNC: 273 MG/DL (ref 65–100)
GLUCOSE UR STRIP.AUTO-MCNC: >1000 MG/DL
HBA1C MFR BLD: >14 % (ref 4–5.6)
HBA1C MFR BLD: >14 % (ref 4–5.6)
HCO3 BLDA-SCNC: 13 MMOL/L (ref 22–26)
HGB UR QL STRIP: ABNORMAL
KETONES UR QL STRIP.AUTO: >80 MG/DL
LEUKOCYTE ESTERASE UR QL STRIP.AUTO: NEGATIVE
Lab: ABNORMAL
MAGNESIUM SERPL-MCNC: 1.7 MG/DL (ref 1.6–2.4)
MAGNESIUM SERPL-MCNC: 1.8 MG/DL (ref 1.6–2.4)
MAGNESIUM SERPL-MCNC: 1.8 MG/DL (ref 1.6–2.4)
MAGNESIUM SERPL-MCNC: 1.9 MG/DL (ref 1.6–2.4)
MAGNESIUM SERPL-MCNC: 2 MG/DL (ref 1.6–2.4)
MAGNESIUM SERPL-MCNC: 2.1 MG/DL (ref 1.6–2.4)
METHADONE UR QL: POSITIVE
NITRITE UR QL STRIP.AUTO: NEGATIVE
OPIATES UR QL: NEGATIVE
PCO2 BLDA: 26 MMHG (ref 35–45)
PCP UR QL: NEGATIVE
PH BLDA: 7.31 (ref 7.35–7.45)
PH UR STRIP: 5 (ref 5–8)
PHOSPHATE SERPL-MCNC: 1.6 MG/DL (ref 2.6–4.7)
PHOSPHATE SERPL-MCNC: 1.6 MG/DL (ref 2.6–4.7)
PHOSPHATE SERPL-MCNC: 1.7 MG/DL (ref 2.6–4.7)
PHOSPHATE SERPL-MCNC: 1.9 MG/DL (ref 2.6–4.7)
PHOSPHATE SERPL-MCNC: 2 MG/DL (ref 2.6–4.7)
PHOSPHATE SERPL-MCNC: 2.3 MG/DL (ref 2.6–4.7)
PO2 BLDA: 64 MMHG (ref 80–100)
POTASSIUM SERPL-SCNC: 3.3 MMOL/L (ref 3.5–5.1)
POTASSIUM SERPL-SCNC: 3.7 MMOL/L (ref 3.5–5.1)
POTASSIUM SERPL-SCNC: 3.7 MMOL/L (ref 3.5–5.1)
POTASSIUM SERPL-SCNC: 3.8 MMOL/L (ref 3.5–5.1)
POTASSIUM SERPL-SCNC: 4.1 MMOL/L (ref 3.5–5.1)
POTASSIUM SERPL-SCNC: 4.9 MMOL/L (ref 3.5–5.1)
POTASSIUM SERPL-SCNC: 5.8 MMOL/L (ref 3.5–5.1)
PROT UR STRIP-MCNC: 100 MG/DL
RBC #/AREA URNS HPF: NORMAL /HPF (ref 0–5)
SAO2 % BLD: 91 % (ref 92–97)
SAO2% DEVICE SAO2% SENSOR NAME: ABNORMAL
SERVICE CMNT-IMP: ABNORMAL
SERVICE CMNT-IMP: NORMAL
SODIUM SERPL-SCNC: 128 MMOL/L (ref 136–145)
SODIUM SERPL-SCNC: 130 MMOL/L (ref 136–145)
SODIUM SERPL-SCNC: 132 MMOL/L (ref 136–145)
SODIUM SERPL-SCNC: 132 MMOL/L (ref 136–145)
SODIUM SERPL-SCNC: 133 MMOL/L (ref 136–145)
SODIUM SERPL-SCNC: 134 MMOL/L (ref 136–145)
SODIUM SERPL-SCNC: 135 MMOL/L (ref 136–145)
SP GR UR REFRACTOMETRY: 1.02 (ref 1–1.03)
SPECIMEN SITE: ABNORMAL
UROBILINOGEN UR QL STRIP.AUTO: 1 EU/DL (ref 0.2–1)
WBC URNS QL MICRO: NORMAL /HPF (ref 0–4)

## 2025-06-16 PROCEDURE — 6360000002 HC RX W HCPCS: Performed by: INTERNAL MEDICINE

## 2025-06-16 PROCEDURE — 36415 COLL VENOUS BLD VENIPUNCTURE: CPT

## 2025-06-16 PROCEDURE — 1200000000 HC SEMI PRIVATE

## 2025-06-16 PROCEDURE — 82803 BLOOD GASES ANY COMBINATION: CPT

## 2025-06-16 PROCEDURE — 6370000000 HC RX 637 (ALT 250 FOR IP): Performed by: NURSE PRACTITIONER

## 2025-06-16 PROCEDURE — 2500000003 HC RX 250 WO HCPCS: Performed by: HOSPITALIST

## 2025-06-16 PROCEDURE — 6360000002 HC RX W HCPCS: Performed by: HOSPITALIST

## 2025-06-16 PROCEDURE — 6370000000 HC RX 637 (ALT 250 FOR IP): Performed by: HOSPITALIST

## 2025-06-16 PROCEDURE — 36600 WITHDRAWAL OF ARTERIAL BLOOD: CPT

## 2025-06-16 PROCEDURE — 83036 HEMOGLOBIN GLYCOSYLATED A1C: CPT

## 2025-06-16 PROCEDURE — 84100 ASSAY OF PHOSPHORUS: CPT

## 2025-06-16 PROCEDURE — 2580000003 HC RX 258: Performed by: HOSPITALIST

## 2025-06-16 PROCEDURE — 6370000000 HC RX 637 (ALT 250 FOR IP): Performed by: INTERNAL MEDICINE

## 2025-06-16 PROCEDURE — 83735 ASSAY OF MAGNESIUM: CPT

## 2025-06-16 PROCEDURE — 99221 1ST HOSP IP/OBS SF/LOW 40: CPT | Performed by: INTERNAL MEDICINE

## 2025-06-16 PROCEDURE — 2580000003 HC RX 258: Performed by: INTERNAL MEDICINE

## 2025-06-16 PROCEDURE — 80048 BASIC METABOLIC PNL TOTAL CA: CPT

## 2025-06-16 PROCEDURE — 82962 GLUCOSE BLOOD TEST: CPT

## 2025-06-16 RX ORDER — 0.9 % SODIUM CHLORIDE 0.9 %
1000 INTRAVENOUS SOLUTION INTRAVENOUS ONCE
Status: DISCONTINUED | OUTPATIENT
Start: 2025-06-16 | End: 2025-06-16

## 2025-06-16 RX ORDER — MORPHINE SULFATE 2 MG/ML
2 INJECTION, SOLUTION INTRAMUSCULAR; INTRAVENOUS ONCE
Status: COMPLETED | OUTPATIENT
Start: 2025-06-17 | End: 2025-06-16

## 2025-06-16 RX ORDER — METHADONE HYDROCHLORIDE 10 MG/ML
170 CONCENTRATE ORAL DAILY
COMMUNITY

## 2025-06-16 RX ORDER — 0.9 % SODIUM CHLORIDE 0.9 %
1000 INTRAVENOUS SOLUTION INTRAVENOUS ONCE
Status: COMPLETED | OUTPATIENT
Start: 2025-06-16 | End: 2025-06-16

## 2025-06-16 RX ORDER — NICOTINE 21 MG/24HR
1 PATCH, TRANSDERMAL 24 HOURS TRANSDERMAL DAILY
Status: DISCONTINUED | OUTPATIENT
Start: 2025-06-16 | End: 2025-06-18 | Stop reason: HOSPADM

## 2025-06-16 RX ORDER — ONDANSETRON 4 MG/1
4 TABLET, ORALLY DISINTEGRATING ORAL EVERY 8 HOURS PRN
Status: ON HOLD | COMMUNITY
Start: 2025-06-14 | End: 2025-06-18 | Stop reason: HOSPADM

## 2025-06-16 RX ORDER — PROCHLORPERAZINE EDISYLATE 5 MG/ML
5 INJECTION INTRAMUSCULAR; INTRAVENOUS EVERY 6 HOURS PRN
Status: DISCONTINUED | OUTPATIENT
Start: 2025-06-16 | End: 2025-06-18 | Stop reason: HOSPADM

## 2025-06-16 RX ORDER — NALOXONE HYDROCHLORIDE 0.4 MG/ML
0.4 INJECTION, SOLUTION INTRAMUSCULAR; INTRAVENOUS; SUBCUTANEOUS PRN
Status: DISCONTINUED | OUTPATIENT
Start: 2025-06-16 | End: 2025-06-18 | Stop reason: HOSPADM

## 2025-06-16 RX ORDER — ONDANSETRON 2 MG/ML
4 INJECTION INTRAMUSCULAR; INTRAVENOUS EVERY 6 HOURS PRN
Status: DISCONTINUED | OUTPATIENT
Start: 2025-06-16 | End: 2025-06-18 | Stop reason: HOSPADM

## 2025-06-16 RX ORDER — DEXTROSE MONOHYDRATE AND SODIUM CHLORIDE 5; .9 G/100ML; G/100ML
INJECTION, SOLUTION INTRAVENOUS CONTINUOUS
Status: DISCONTINUED | OUTPATIENT
Start: 2025-06-16 | End: 2025-06-17

## 2025-06-16 RX ORDER — CALCIUM CARBONATE 500 MG/1
500 TABLET, CHEWABLE ORAL 3 TIMES DAILY PRN
Status: DISCONTINUED | OUTPATIENT
Start: 2025-06-16 | End: 2025-06-18 | Stop reason: HOSPADM

## 2025-06-16 RX ORDER — GABAPENTIN 600 MG/1
600 TABLET ORAL 2 TIMES DAILY
COMMUNITY

## 2025-06-16 RX ORDER — QUETIAPINE FUMARATE 100 MG/1
100 TABLET, FILM COATED ORAL
Status: ON HOLD | COMMUNITY
End: 2025-06-18

## 2025-06-16 RX ADMIN — SODIUM PHOSPHATE, MONOBASIC, MONOHYDRATE AND SODIUM PHOSPHATE, DIBASIC, ANHYDROUS 15 MMOL: 142; 276 INJECTION, SOLUTION INTRAVENOUS at 12:47

## 2025-06-16 RX ADMIN — SODIUM CHLORIDE 40 MG: 9 INJECTION INTRAMUSCULAR; INTRAVENOUS; SUBCUTANEOUS at 01:21

## 2025-06-16 RX ADMIN — ENOXAPARIN SODIUM 30 MG: 100 INJECTION SUBCUTANEOUS at 08:44

## 2025-06-16 RX ADMIN — DEXTROSE, SODIUM CHLORIDE, AND POTASSIUM CHLORIDE: 5; .45; .15 INJECTION INTRAVENOUS at 05:46

## 2025-06-16 RX ADMIN — SODIUM CHLORIDE 1000 ML: 0.9 INJECTION, SOLUTION INTRAVENOUS at 06:34

## 2025-06-16 RX ADMIN — POTASSIUM CHLORIDE 10 MEQ: 7.46 INJECTION, SOLUTION INTRAVENOUS at 15:08

## 2025-06-16 RX ADMIN — MORPHINE SULFATE 2 MG: 2 INJECTION, SOLUTION INTRAMUSCULAR; INTRAVENOUS at 23:45

## 2025-06-16 RX ADMIN — DEXTROSE AND SODIUM CHLORIDE: 5; 900 INJECTION, SOLUTION INTRAVENOUS at 13:35

## 2025-06-16 RX ADMIN — CALCIUM CARBONATE (ANTACID) CHEW TAB 500 MG 500 MG: 500 CHEW TAB at 01:21

## 2025-06-16 RX ADMIN — INSULIN HUMAN 1.3 UNITS/HR: 1 INJECTION, SOLUTION INTRAVENOUS at 20:09

## 2025-06-16 RX ADMIN — ENOXAPARIN SODIUM 30 MG: 100 INJECTION SUBCUTANEOUS at 20:48

## 2025-06-16 RX ADMIN — METHADONE HYDROCHLORIDE 170 MG: 10 CONCENTRATE ORAL at 17:55

## 2025-06-16 RX ADMIN — POTASSIUM CHLORIDE 10 MEQ: 7.46 INJECTION, SOLUTION INTRAVENOUS at 12:27

## 2025-06-16 RX ADMIN — DEXTROSE AND SODIUM CHLORIDE: 5; 900 INJECTION, SOLUTION INTRAVENOUS at 19:28

## 2025-06-16 RX ADMIN — POTASSIUM CHLORIDE 10 MEQ: 7.46 INJECTION, SOLUTION INTRAVENOUS at 16:10

## 2025-06-16 RX ADMIN — INSULIN HUMAN 4 UNITS/HR: 1 INJECTION, SOLUTION INTRAVENOUS at 14:47

## 2025-06-16 RX ADMIN — DEXTROSE AND SODIUM CHLORIDE: 5; 900 INJECTION, SOLUTION INTRAVENOUS at 06:31

## 2025-06-16 RX ADMIN — ONDANSETRON 4 MG: 2 INJECTION, SOLUTION INTRAMUSCULAR; INTRAVENOUS at 07:10

## 2025-06-16 RX ADMIN — PROCHLORPERAZINE EDISYLATE 5 MG: 5 INJECTION INTRAMUSCULAR; INTRAVENOUS at 10:58

## 2025-06-16 ASSESSMENT — PAIN SCALES - GENERAL
PAINLEVEL_OUTOF10: 0
PAINLEVEL_OUTOF10: 0
PAINLEVEL_OUTOF10: 4
PAINLEVEL_OUTOF10: 10
PAINLEVEL_OUTOF10: 8

## 2025-06-16 ASSESSMENT — PAIN - FUNCTIONAL ASSESSMENT: PAIN_FUNCTIONAL_ASSESSMENT: PREVENTS OR INTERFERES SOME ACTIVE ACTIVITIES AND ADLS

## 2025-06-16 ASSESSMENT — PAIN DESCRIPTION - LOCATION: LOCATION: LEG

## 2025-06-16 ASSESSMENT — PAIN DESCRIPTION - ORIENTATION: ORIENTATION: RIGHT;LEFT

## 2025-06-16 NOTE — H&P
Hospitalist Admission Note    NAME: Juliette Saba   :  1995   MRN:  [id number]     Date/Time:  6/15/2025 10:12 PM    Patient PCP: Apolinar Akhtar MD  _____________________________________________________________________  Given the patient's current clinical presentation, I have a high level of concern for decompensation if discharged from the emergency department.  Complex decision making was performed, which includes reviewing the patient's available past medical records, laboratory results, and x-ray films.       My assessment of this patient's clinical condition and my plan of care is as follows.    Assessment / Plan:  30-year-old female presents with nausea vomiting abdominal pain.  History of type 2 diabetes.  Evaluated earlier this morning in this emergency department for the same symptoms but left AMA.  Patient stated she went home and continued to vomit and presents tonight for admission.  Patient also states she is methadone dependent and has not taken her methadone for 3 days due to the vomiting.  Also noted that patient had a CT of abdomen and pelvis earlier today with no acute findings.on arrival to ed pt was  actively vomiting.  Epigastric tenderness on exam.  Labs reviewed-POC lactic acid 2, POC glucose 261 WBC 15 CO2 is 11 anion gap is 23 VBG pCO2 22.6.  Patient in DKA.  I will call for admission.  Patient has received Zofran and droperidol which is now controlled her vomiting.  She  was started  IV normal saline and insulin drip.          DKA  Nausea  Vomitting   abd pain  Methadone dependent    170mg daily  Patient left yesterday AGAINST MEDICAL ADVICE  from ed    Admit to Hospitalist service    Tele   DKA protocoal initiateded  IVF  Insulin drip  Monitor lytes  Npo except  meds  Resume methadone    Other Past Medical History :    HTN  GERD  HLD  DM-2  Psychiatric  Disorder  anxiety  depression      Pharmacy  Consulted  &   Nurses Communication  Ordered for Home  Medication

## 2025-06-16 NOTE — PROGRESS NOTES
2225: Report received on pt coming to PCU 3 from ED for routine progression of care. Telephone report received from APOORVA Davis.    [id number]: Pt received to floor. Bedside monitoring initiated. D5 1/2 NS w/ 20 kcl infusion initiated per order based on pt most recent blood glucose reading. Pt oriented to room and call bell use. Pt very anxious, asking for something to help her breathing and something to drink. Pt educated on NPO status. Dr. Hernandez notified of tachypnea and pt request. Tele robot active at bedside. Per Dr. Hernandez, Ok to have small sips of water. Pt given 100mL of ice water and reminded to take very small sips. Pt immediately drank entire cup and asked for more. Order received for 170mg Methadone which is what pt states she normally takes. She says she could not get her dose today bc the clinic is closed on Sundays. Order received for methadone. Pt medicated per order. Urine samples sent to lab. Safety measures are in place.    0033: Pt requesting anxiety meds. When asked what she takes at home she states she does not know. Dr. ERNESTINE Hernandez notified. Per provider, we will wait 30 minutes and reassess.    0040: Pt requesting heartburn meds. Provider notified.    0214: Critical CO2 of 12 received from lab. Dr. ERNESTINE Hernandez notified.    0615: Dr. ERNESTINE Hernandez notified of critical CO2 of 12 and low sodium and elevated potassium. Do you want to change maintenance fluid order? New orders received.    0655: Pt had one large episode of thin, brownish emesis all over floor. No antiemetics available. Dr. JINA Hernandez contacted via [employer].    0700: Order received for PRN Zofran. Pt medicated per order- see eMAR.    0720: Shift handoff given to APOORVA Ying.

## 2025-06-16 NOTE — ED NOTES
TRANSFER - OUT REPORT:    Verbal report given to APOORVA Puckett on Juliette Saba  being transferred to [facility] PCU3 for routine progression of patient care       Report consisted of patient's Situation, Background, Assessment and   Recommendations(SBAR).     Information from the following report(s) Nurse Handoff Report, ED Encounter Summary, ED SBAR, MAR, and Recent Results was reviewed with the receiving nurse.           Lines:   Peripheral IV 06/15/25 Left Antecubital (Active)       Peripheral IV 06/15/25 Right;Anterior Hand (Active)        Opportunity for questions and clarification was provided.      Patient transported with:  Monitor

## 2025-06-16 NOTE — PROGRESS NOTES
[facility] Pharmacy Admission Medication History    Information obtained from: Patient  RxQuery data available1: Yes    Comments/recommendations:  Patient was very drowsy during medication interview  Patient reports insulin glargine is \"about 10 units or sliding scale\" and last dose was \"a few days ago\"  Patient reports only taking quetiapine 100 mg at bedtime (prescribed as 100-200 mg nightly)   Patient reports taking gabapentin twice daily (prescribed three times daily)  Confirmed methadone 170 mg PO daily with Belinda,  at [facility] ([phone]). Per staff, patient received methadone 170 mg in clinic on 6/14/25, but then vomited. A verbal order was received for an additional 40 mg x 1. The patient then also received 1 take-home dose of methadone 170 mg for 6/15/25 (Sunday). Patient is in good standing to continue with the clinic.    Medication changes (since last review):  Added  Gabapentin  Methadone  Ondansetron  quetiapine  Not Taking  Acyclovir ointment  Insulin lispro  Lidocaine gel  Medroxyprogesterone  Metformin  Omeprazole  venlafaxine  Adjusted  Insulin glargine to 10 units nightly     1RxQuery pharmacy benefit data reflects medications filled and processed through the patient's insurance, however                this data does NOT capture whether the medication was picked up or is currently being taken by the patient.     Patient allergies:   Allergies as of 06/15/2025    (No Known Allergies)     Prior to Admission Medications   Prescriptions Last Dose Informant   QUEtiapine (SEROQUEL) 100 MG tablet  Self   Sig: Take 1 tablet by mouth nightly   acetaminophen (TYLENOL) 500 MG tablet  Self   Sig: Take 2 tablets by mouth every 6 hours as needed for Pain   gabapentin (NEURONTIN) 600 MG tablet  Self   Sig: Take 1 tablet by mouth 2 times daily. Max Daily Amount: 1,200 mg   insulin glargine (LANTUS SOLOSTAR) 100 UNIT/ML injection pen  Self   Sig: Inject 10 Units into the skin nightly

## 2025-06-16 NOTE — ED NOTES
Pt presents ambulatory to ED via EMS complaining of nausea, vomiting, headache and fatigue. Pt left [facility] AMA earlier this morning. She states that she is out of her methadone after missing her appointment. Pt is alert and oriented x 4, RR even and unlabored, skin is warm and dry. Assesment completed and pt updated on plan of care.       Emergency Department Nursing Plan of Care       The Nursing Plan of Care is developed from the Nursing assessment and Emergency Department Attending provider initial evaluation.  The plan of care may be reviewed in the “ED Provider note”.    The Plan of Care was developed with the following considerations:   Patient / Family readiness to learn indicated by:verbalized understanding  Persons(s) to be included in education: patient  Barriers to Learning/Limitations:None    Signed     Miguel Walker RN    6/15/2025   8:21 PM

## 2025-06-16 NOTE — PLAN OF CARE
Problem: Chronic Conditions and Co-morbidities  Goal: Patient's chronic conditions and co-morbidity symptoms are monitored and maintained or improved  6/16/2025 0810 by Josy Rodgers RN  Outcome: Progressing  6/16/2025 0053 by Ami Ayers RN  Outcome: Progressing  6/16/2025 0053 by Ami Ayers RN  Outcome: Progressing     Problem: Discharge Planning  Goal: Discharge to home or other facility with appropriate resources  6/16/2025 0810 by Josy Rodgers RN  Outcome: Progressing  6/16/2025 0053 by Ami Ayers RN  Outcome: Progressing     Problem: Pain  Goal: Verbalizes/displays adequate comfort level or baseline comfort level  6/16/2025 0810 by Josy Rodgers RN  Outcome: Progressing  6/16/2025 0053 by Ami Ayers RN  Outcome: Progressing     Problem: Safety - Adult  Goal: Free from fall injury  6/16/2025 0810 by Josy Rodgers RN  Outcome: Progressing  6/16/2025 0053 by Ami Ayers RN  Outcome: Progressing     Problem: Neurosensory - Adult  Goal: Achieves stable or improved neurological status  6/16/2025 0810 by Josy Rodgers RN  Outcome: Progressing  6/16/2025 0053 by Ami Ayers RN  Outcome: Progressing  Goal: Absence of seizures  6/16/2025 0810 by Josy Rodgers RN  Outcome: Progressing  6/16/2025 0053 by Ami Ayers RN  Outcome: Progressing  Goal: Achieves maximal functionality and self care  6/16/2025 0810 by Josy Rodgers RN  Outcome: Progressing  6/16/2025 0053 by Ami Ayers RN  Outcome: Progressing     Problem: Gastrointestinal - Adult  Goal: Minimal or absence of nausea and vomiting  6/16/2025 0810 by Josy Rodgers RN  Outcome: Progressing  6/16/2025 0053 by Ami Ayers RN  Outcome: Not Progressing  Goal: Maintains or returns to baseline bowel function  6/16/2025 0810 by Josy Rodgers RN  Outcome: Progressing  6/16/2025 0053 by Ami Ayers RN  Outcome: Not Progressing  Goal: Maintains adequate nutritional intake  6/16/2025

## 2025-06-16 NOTE — PROGRESS NOTES
ABG results as follows:     Latest Reference Range & Units 06/16/25 12:19   Fritz Test -   NOT APPLICABLE   pH, Arterial 7.35 - 7.45   7.31 (L)   pCO2, Arterial 35 - 45 mmHg 26 (L)   pO2, Arterial 80 - 100 mmHg 64 (L)   HCO3, Arterial 22 - 26 mmol/L 13 (L)   Site -   RIGHT BRACHIAL   POC O2 SAT 92 - 97 % 91 (L)

## 2025-06-16 NOTE — CARE COORDINATION
Care Management Initial Assessment       RUR:11%  Readmission? No  1st IM letter given? N/a  1st  letter given: N/A      Patient admitted with DKA TYPE 2    CM MET WITH PATIENT AT BEDSIDE TO INTRODUCE SELF, EXPLAIN ROLE AND COMPLETE INITIAL ASSESSMENT.  PATIENT VERIFIED NAME AND DATE OF BIRTH .    DEMOGRAPHICS, PHARMACY AND TRANSPORTATION HOME IDENTIFIED. PATIENT STATED  THAT SHE RECEIVES HER METHADONE FROM [facility] AND THEY DO KNOW SHE IS HOSPITALIZED. SHE IS ABLE TO RETURN AT DISCHARGE       06/16/25 0158   Service Assessment   Patient Orientation Alert and Oriented;Person;Place;Situation;Self   Cognition Alert   History Provided By Patient   Primary Caregiver Self   Accompanied By/Relationship N/A   Support Systems Parent   Patient's Healthcare Decision Maker is: Patient Declined (Legal Next of Kin Remains as Decision Maker)   PCP Verified by CM Yes   Last Visit to PCP Within last 6 months  (Friday 6/20/2025 at 8am)   Prior Functional Level Independent in ADLs/IADLs   Current Functional Level Independent in ADLs/IADLs   Can patient return to prior living arrangement Yes   Ability to make needs known: Good   Family able to assist with home care needs: No   Would you like for me to discuss the discharge plan with any other family members/significant others, and if so, who? No   Financial Resources Medicaid   Discharge Planning   Living Arrangements Parent   Current Services Prior To Admission Durable Medical Equipment   Current DME Prior to Arrival Glucometer   Services At/After Discharge   [place] Resource Information Provided? No   Mode of Transport at Discharge Other (see comment)  (family)   Condition of Participation: Discharge Planning   The Plan for Transition of Care is related to the following treatment goals: PCP   Freedom of Choice list was provided with basic dialogue that supports the patient's individualized plan of care/goals, treatment preferences, and shares the quality data associated

## 2025-06-16 NOTE — PROGRESS NOTES
[facility]                                                                               Hospitalist Progress Note  Felix Hernandez MD          Date of Service:  2025  NAME:  Juliette Saba  :  1995  MRN:  [id number]    Please note that this dictation was completed with [email or web address], the computer voice recognition software.  Quite often unanticipated grammatical, syntax, homophones, and other interpretive errors are inadvertently transcribed by the computer software.  Please disregard these errors.  Please excuse any errors that have escaped final proofreading.    Admission Summary:   CHIEF COMPLAINT:  Nausea  Vomiting Abdominal Pain   Per pt she was here last night and left instead of being admitted      Pt presents ambulatory to ED via EMS complaining of nausea, vomiting, headache and fatigue. Pt left [facility] AMA earlier this morning. She states that she is out of her methadone after missing her appointmen              HISTORY OF PRESENT ILLNESS:     Juliette Saba is a 30 y.o. female who presents cc abdominal pain nausea and vomiting.  States she vomited multiple times.  On chart review patient was evaluated in the emergency department earlier this morning.  Patient left AMA.  States the vomiting has persisted.  She reports generalized abdominal pain.  She states that she is methadone dependent but has not taken her medication due to the vomiting.  She denies fever.  She denies diarrhea.  She denies chest pain shortness of breath.           Review of Systems   Constitutional:  Negative for fever.   HENT:  Negative for congestion.    Eyes:  Negative for visual disturbance.   Respiratory:  Negative for shortness of breath.    Cardiovascular:  Negative for chest pain.   Gastrointestinal:  Positive for abdominal pain, nausea and vomiting.   Musculoskeletal:  Negative for back pain and neck pain.   Skin:  Negative for rash.   Neurological:  Negative for weakness and

## 2025-06-16 NOTE — ED PROVIDER NOTES
[facility] EMERGENCY DEPARTMENT  EMERGENCY DEPARTMENT ENCOUNTER       Pt Name: Juliette Saba  MRN: [id number]  Birthdate 1995  Date of evaluation: 6/15/2025  Provider: TAYLER Dubon NP   PCP: Apolinar Akhtar MD  Note Started: 9:31 PM 6/15/25     CHIEF COMPLAINT       Chief Complaint   Patient presents with    Abdominal Pain     Per pt she was here last night and left instead of being admitted         HISTORY OF PRESENT ILLNESS: 1 or more elements      History Provided by: Patient   History is limited by: Nothing     Juliette Saba is a 30 y.o. female who presents cc abdominal pain nausea and vomiting.  States she vomited multiple times.  On chart review patient was evaluated in the emergency department earlier this morning.  Patient left AMA.  States the vomiting has persisted.  She reports generalized abdominal pain.  She states that she is methadone dependent but has not taken her medication due to the vomiting.  She denies fever.  She denies diarrhea.  She denies chest pain shortness of breath.  Nursing Notes were all reviewed and agreed with or any disagreements were addressed in the HPI.     REVIEW OF SYSTEMS      Review of Systems   Constitutional:  Negative for fever.   HENT:  Negative for congestion.    Eyes:  Negative for visual disturbance.   Respiratory:  Negative for shortness of breath.    Cardiovascular:  Negative for chest pain.   Gastrointestinal:  Positive for abdominal pain, nausea and vomiting.   Musculoskeletal:  Negative for back pain and neck pain.   Skin:  Negative for rash.   Neurological:  Negative for weakness and headaches.   All other systems reviewed and are negative.       Positives and Pertinent negatives as per HPI.    PAST HISTORY     Past Medical History:  Past Medical History:   Diagnosis Date    Anxiety     Depression     Diabetes (HCC)     GERD (gastroesophageal reflux disease)     Heavy menses     pt states off and on x 1 year    High cholesterol

## 2025-06-17 LAB
ANION GAP SERPL CALC-SCNC: 10 MMOL/L (ref 2–12)
ANION GAP SERPL CALC-SCNC: 11 MMOL/L (ref 2–12)
ANION GAP SERPL CALC-SCNC: 7 MMOL/L (ref 2–12)
BASOPHILS # BLD: 0.04 K/UL (ref 0–0.1)
BASOPHILS NFR BLD: 0.3 % (ref 0–1)
BUN SERPL-MCNC: 2 MG/DL (ref 6–20)
BUN SERPL-MCNC: 3 MG/DL (ref 6–20)
BUN SERPL-MCNC: 3 MG/DL (ref 6–20)
BUN/CREAT SERPL: 5 (ref 12–20)
BUN/CREAT SERPL: 5 (ref 12–20)
BUN/CREAT SERPL: 9 (ref 12–20)
CALCIUM SERPL-MCNC: 8.2 MG/DL (ref 8.5–10.1)
CALCIUM SERPL-MCNC: 8.8 MG/DL (ref 8.5–10.1)
CALCIUM SERPL-MCNC: 9 MG/DL (ref 8.5–10.1)
CHLORIDE SERPL-SCNC: 107 MMOL/L (ref 97–108)
CHLORIDE SERPL-SCNC: 107 MMOL/L (ref 97–108)
CHLORIDE SERPL-SCNC: 108 MMOL/L (ref 97–108)
CO2 SERPL-SCNC: 18 MMOL/L (ref 21–32)
CO2 SERPL-SCNC: 19 MMOL/L (ref 21–32)
CO2 SERPL-SCNC: 21 MMOL/L (ref 21–32)
CREAT SERPL-MCNC: 0.34 MG/DL (ref 0.55–1.02)
CREAT SERPL-MCNC: 0.41 MG/DL (ref 0.55–1.02)
CREAT SERPL-MCNC: 0.59 MG/DL (ref 0.55–1.02)
DIFFERENTIAL METHOD BLD: ABNORMAL
EKG ATRIAL RATE: 66 BPM
EKG DIAGNOSIS: NORMAL
EKG P AXIS: 24 DEGREES
EKG P-R INTERVAL: 176 MS
EKG Q-T INTERVAL: 460 MS
EKG QRS DURATION: 82 MS
EKG QTC CALCULATION (BAZETT): 482 MS
EKG R AXIS: 44 DEGREES
EKG T AXIS: 62 DEGREES
EKG VENTRICULAR RATE: 66 BPM
EOSINOPHIL # BLD: 0.03 K/UL (ref 0–0.4)
EOSINOPHIL NFR BLD: 0.2 % (ref 0–7)
ERYTHROCYTE [DISTWIDTH] IN BLOOD BY AUTOMATED COUNT: 13.5 % (ref 11.5–14.5)
GLUCOSE BLD STRIP.AUTO-MCNC: 106 MG/DL (ref 65–117)
GLUCOSE BLD STRIP.AUTO-MCNC: 111 MG/DL (ref 65–117)
GLUCOSE BLD STRIP.AUTO-MCNC: 127 MG/DL (ref 65–117)
GLUCOSE BLD STRIP.AUTO-MCNC: 131 MG/DL (ref 65–117)
GLUCOSE BLD STRIP.AUTO-MCNC: 137 MG/DL (ref 65–117)
GLUCOSE BLD STRIP.AUTO-MCNC: 138 MG/DL (ref 65–117)
GLUCOSE BLD STRIP.AUTO-MCNC: 140 MG/DL (ref 65–117)
GLUCOSE BLD STRIP.AUTO-MCNC: 151 MG/DL (ref 65–117)
GLUCOSE BLD STRIP.AUTO-MCNC: 162 MG/DL (ref 65–117)
GLUCOSE BLD STRIP.AUTO-MCNC: 163 MG/DL (ref 65–117)
GLUCOSE BLD STRIP.AUTO-MCNC: 166 MG/DL (ref 65–117)
GLUCOSE BLD STRIP.AUTO-MCNC: 166 MG/DL (ref 65–117)
GLUCOSE BLD STRIP.AUTO-MCNC: 186 MG/DL (ref 65–117)
GLUCOSE BLD STRIP.AUTO-MCNC: 199 MG/DL (ref 65–117)
GLUCOSE BLD STRIP.AUTO-MCNC: 216 MG/DL (ref 65–117)
GLUCOSE SERPL-MCNC: 142 MG/DL (ref 65–100)
GLUCOSE SERPL-MCNC: 174 MG/DL (ref 65–100)
GLUCOSE SERPL-MCNC: 185 MG/DL (ref 65–100)
HCT VFR BLD AUTO: 42.3 % (ref 35–47)
HGB BLD-MCNC: 14.8 G/DL (ref 11.5–16)
IMM GRANULOCYTES # BLD AUTO: 0.04 K/UL (ref 0–0.04)
IMM GRANULOCYTES NFR BLD AUTO: 0.3 % (ref 0–0.5)
LYMPHOCYTES # BLD: 4.59 K/UL (ref 0.8–3.5)
LYMPHOCYTES NFR BLD: 37.6 % (ref 12–49)
MAGNESIUM SERPL-MCNC: 1.6 MG/DL (ref 1.6–2.4)
MAGNESIUM SERPL-MCNC: 1.6 MG/DL (ref 1.6–2.4)
MAGNESIUM SERPL-MCNC: 1.8 MG/DL (ref 1.6–2.4)
MCH RBC QN AUTO: 31.3 PG (ref 26–34)
MCHC RBC AUTO-ENTMCNC: 35 G/DL (ref 30–36.5)
MCV RBC AUTO: 89.4 FL (ref 80–99)
MONOCYTES # BLD: 0.98 K/UL (ref 0–1)
MONOCYTES NFR BLD: 8 % (ref 5–13)
NEUTS SEG # BLD: 6.53 K/UL (ref 1.8–8)
NEUTS SEG NFR BLD: 53.6 % (ref 32–75)
NRBC # BLD: 0 K/UL (ref 0–0.01)
NRBC BLD-RTO: 0 PER 100 WBC
PHOSPHATE SERPL-MCNC: 2.4 MG/DL (ref 2.6–4.7)
PHOSPHATE SERPL-MCNC: 2.5 MG/DL (ref 2.6–4.7)
PHOSPHATE SERPL-MCNC: 2.5 MG/DL (ref 2.6–4.7)
PLATELET # BLD AUTO: 230 K/UL (ref 150–400)
PMV BLD AUTO: 10.5 FL (ref 8.9–12.9)
POTASSIUM SERPL-SCNC: 3.7 MMOL/L (ref 3.5–5.1)
POTASSIUM SERPL-SCNC: 4.2 MMOL/L (ref 3.5–5.1)
POTASSIUM SERPL-SCNC: 5.1 MMOL/L (ref 3.5–5.1)
RBC # BLD AUTO: 4.73 M/UL (ref 3.8–5.2)
SERVICE CMNT-IMP: ABNORMAL
SERVICE CMNT-IMP: NORMAL
SERVICE CMNT-IMP: NORMAL
SODIUM SERPL-SCNC: 136 MMOL/L (ref 136–145)
WBC # BLD AUTO: 12.2 K/UL (ref 3.6–11)

## 2025-06-17 PROCEDURE — 82962 GLUCOSE BLOOD TEST: CPT

## 2025-06-17 PROCEDURE — 83735 ASSAY OF MAGNESIUM: CPT

## 2025-06-17 PROCEDURE — 2500000003 HC RX 250 WO HCPCS: Performed by: HOSPITALIST

## 2025-06-17 PROCEDURE — 84100 ASSAY OF PHOSPHORUS: CPT

## 2025-06-17 PROCEDURE — 6370000000 HC RX 637 (ALT 250 FOR IP): Performed by: INTERNAL MEDICINE

## 2025-06-17 PROCEDURE — 36600 WITHDRAWAL OF ARTERIAL BLOOD: CPT

## 2025-06-17 PROCEDURE — 6360000002 HC RX W HCPCS: Performed by: HOSPITALIST

## 2025-06-17 PROCEDURE — 36415 COLL VENOUS BLD VENIPUNCTURE: CPT

## 2025-06-17 PROCEDURE — 80048 BASIC METABOLIC PNL TOTAL CA: CPT

## 2025-06-17 PROCEDURE — 85025 COMPLETE CBC W/AUTO DIFF WBC: CPT

## 2025-06-17 PROCEDURE — 2580000003 HC RX 258: Performed by: INTERNAL MEDICINE

## 2025-06-17 PROCEDURE — 2580000003 HC RX 258: Performed by: HOSPITALIST

## 2025-06-17 PROCEDURE — 6370000000 HC RX 637 (ALT 250 FOR IP): Performed by: NURSE PRACTITIONER

## 2025-06-17 PROCEDURE — 6370000000 HC RX 637 (ALT 250 FOR IP): Performed by: HOSPITALIST

## 2025-06-17 PROCEDURE — 1200000000 HC SEMI PRIVATE

## 2025-06-17 PROCEDURE — 93005 ELECTROCARDIOGRAM TRACING: CPT | Performed by: INTERNAL MEDICINE

## 2025-06-17 RX ORDER — POTASSIUM CHLORIDE 750 MG/1
40 TABLET, EXTENDED RELEASE ORAL PRN
Status: DISCONTINUED | OUTPATIENT
Start: 2025-06-17 | End: 2025-06-18 | Stop reason: HOSPADM

## 2025-06-17 RX ORDER — INSULIN LISPRO 100 [IU]/ML
0-8 INJECTION, SOLUTION INTRAVENOUS; SUBCUTANEOUS
Status: DISCONTINUED | OUTPATIENT
Start: 2025-06-17 | End: 2025-06-18 | Stop reason: HOSPADM

## 2025-06-17 RX ORDER — POTASSIUM CHLORIDE 7.45 MG/ML
10 INJECTION INTRAVENOUS PRN
Status: DISCONTINUED | OUTPATIENT
Start: 2025-06-17 | End: 2025-06-18 | Stop reason: HOSPADM

## 2025-06-17 RX ORDER — METHADONE HYDROCHLORIDE 10 MG/ML
170 CONCENTRATE ORAL DAILY
Refills: 0 | Status: DISCONTINUED | OUTPATIENT
Start: 2025-06-17 | End: 2025-06-18

## 2025-06-17 RX ORDER — INSULIN GLARGINE 100 [IU]/ML
24 INJECTION, SOLUTION SUBCUTANEOUS DAILY
Status: DISCONTINUED | OUTPATIENT
Start: 2025-06-17 | End: 2025-06-18

## 2025-06-17 RX ORDER — ACETAMINOPHEN 325 MG/1
650 TABLET ORAL EVERY 6 HOURS PRN
Status: DISCONTINUED | OUTPATIENT
Start: 2025-06-17 | End: 2025-06-18 | Stop reason: HOSPADM

## 2025-06-17 RX ORDER — INSULIN LISPRO 100 [IU]/ML
8 INJECTION, SOLUTION INTRAVENOUS; SUBCUTANEOUS
Status: DISCONTINUED | OUTPATIENT
Start: 2025-06-17 | End: 2025-06-18

## 2025-06-17 RX ORDER — QUETIAPINE FUMARATE 100 MG/1
100 TABLET, FILM COATED ORAL NIGHTLY
Status: DISCONTINUED | OUTPATIENT
Start: 2025-06-17 | End: 2025-06-18 | Stop reason: HOSPADM

## 2025-06-17 RX ADMIN — INSULIN HUMAN 1.6 UNITS/HR: 1 INJECTION, SOLUTION INTRAVENOUS at 09:22

## 2025-06-17 RX ADMIN — ACETAMINOPHEN 650 MG: 325 TABLET ORAL at 18:43

## 2025-06-17 RX ADMIN — POTASSIUM CHLORIDE 10 MEQ: 7.46 INJECTION, SOLUTION INTRAVENOUS at 00:08

## 2025-06-17 RX ADMIN — INSULIN HUMAN 5.3 UNITS/HR: 1 INJECTION, SOLUTION INTRAVENOUS at 11:35

## 2025-06-17 RX ADMIN — INSULIN HUMAN 3.5 UNITS/HR: 1 INJECTION, SOLUTION INTRAVENOUS at 10:25

## 2025-06-17 RX ADMIN — INSULIN GLARGINE 24 UNITS: 100 INJECTION, SOLUTION SUBCUTANEOUS at 11:14

## 2025-06-17 RX ADMIN — METHADONE HYDROCHLORIDE 170 MG: 10 CONCENTRATE ORAL at 13:30

## 2025-06-17 RX ADMIN — ENOXAPARIN SODIUM 30 MG: 100 INJECTION SUBCUTANEOUS at 08:20

## 2025-06-17 RX ADMIN — SODIUM PHOSPHATE, MONOBASIC, MONOHYDRATE AND SODIUM PHOSPHATE, DIBASIC, ANHYDROUS 15 MMOL: 142; 276 INJECTION, SOLUTION INTRAVENOUS at 09:25

## 2025-06-17 RX ADMIN — INSULIN HUMAN 2.2 UNITS/HR: 1 INJECTION, SOLUTION INTRAVENOUS at 12:35

## 2025-06-17 RX ADMIN — INSULIN HUMAN 1.4 UNITS/HR: 1 INJECTION, SOLUTION INTRAVENOUS at 08:13

## 2025-06-17 RX ADMIN — POTASSIUM CHLORIDE 10 MEQ: 7.46 INJECTION, SOLUTION INTRAVENOUS at 03:07

## 2025-06-17 RX ADMIN — DEXTROSE AND SODIUM CHLORIDE: 5; 900 INJECTION, SOLUTION INTRAVENOUS at 05:29

## 2025-06-17 RX ADMIN — QUETIAPINE FUMARATE 100 MG: 100 TABLET ORAL at 23:28

## 2025-06-17 RX ADMIN — INSULIN LISPRO 8 UNITS: 100 INJECTION, SOLUTION INTRAVENOUS; SUBCUTANEOUS at 17:31

## 2025-06-17 RX ADMIN — POTASSIUM CHLORIDE 10 MEQ: 7.46 INJECTION, SOLUTION INTRAVENOUS at 07:13

## 2025-06-17 RX ADMIN — SODIUM CHLORIDE 40 MG: 9 INJECTION INTRAMUSCULAR; INTRAVENOUS; SUBCUTANEOUS at 08:20

## 2025-06-17 RX ADMIN — INSULIN LISPRO 8 UNITS: 100 INJECTION, SOLUTION INTRAVENOUS; SUBCUTANEOUS at 12:30

## 2025-06-17 RX ADMIN — SODIUM PHOSPHATE, MONOBASIC, MONOHYDRATE AND SODIUM PHOSPHATE, DIBASIC, ANHYDROUS 15 MMOL: 142; 276 INJECTION, SOLUTION INTRAVENOUS at 00:15

## 2025-06-17 RX ADMIN — POTASSIUM CHLORIDE 10 MEQ: 7.46 INJECTION, SOLUTION INTRAVENOUS at 08:19

## 2025-06-17 RX ADMIN — DEXTROSE AND SODIUM CHLORIDE: 5; 900 INJECTION, SOLUTION INTRAVENOUS at 00:24

## 2025-06-17 RX ADMIN — POTASSIUM CHLORIDE 10 MEQ: 7.46 INJECTION, SOLUTION INTRAVENOUS at 01:08

## 2025-06-17 RX ADMIN — SODIUM PHOSPHATE, MONOBASIC, MONOHYDRATE AND SODIUM PHOSPHATE, DIBASIC, ANHYDROUS 15 MMOL: 142; 276 INJECTION, SOLUTION INTRAVENOUS at 15:51

## 2025-06-17 RX ADMIN — POTASSIUM CHLORIDE 10 MEQ: 7.46 INJECTION, SOLUTION INTRAVENOUS at 05:00

## 2025-06-17 RX ADMIN — POTASSIUM CHLORIDE 10 MEQ: 7.46 INJECTION, SOLUTION INTRAVENOUS at 02:09

## 2025-06-17 ASSESSMENT — PAIN DESCRIPTION - LOCATION: LOCATION: HEAD

## 2025-06-17 ASSESSMENT — PAIN SCALES - GENERAL
PAINLEVEL_OUTOF10: 0
PAINLEVEL_OUTOF10: 3
PAINLEVEL_OUTOF10: 0

## 2025-06-17 ASSESSMENT — PAIN DESCRIPTION - DESCRIPTORS: DESCRIPTORS: ACHING

## 2025-06-17 NOTE — PLAN OF CARE
Problem: Chronic Conditions and Co-morbidities  Goal: Patient's chronic conditions and co-morbidity symptoms are monitored and maintained or improved  6/17/2025 1042 by Rosalina Hu RN  Outcome: Progressing  6/16/2025 2240 by Augie Miles RN  Outcome: Progressing     Problem: Discharge Planning  Goal: Discharge to home or other facility with appropriate resources  6/17/2025 1042 by Rosalina Hu RN  Outcome: Progressing  6/16/2025 2240 by Augie Miles RN  Outcome: Progressing     Problem: Pain  Goal: Verbalizes/displays adequate comfort level or baseline comfort level  6/17/2025 1042 by Rosalina Hu RN  Outcome: Progressing  6/16/2025 2240 by Augie Miles RN  Outcome: Progressing     Problem: Safety - Adult  Goal: Free from fall injury  6/17/2025 1042 by Rosalina Hu RN  Outcome: Progressing  6/16/2025 2240 by Augie Miles RN  Outcome: Progressing     Problem: Neurosensory - Adult  Goal: Achieves stable or improved neurological status  6/17/2025 1042 by Rosalina Hu RN  Outcome: Progressing  6/16/2025 2240 by Augie Miles RN  Outcome: Progressing  Goal: Absence of seizures  6/17/2025 1042 by Rosalina Hu RN  Outcome: Progressing  6/16/2025 2240 by Augie Miles RN  Outcome: Progressing  Goal: Achieves maximal functionality and self care  6/17/2025 1042 by Rosalina Hu RN  Outcome: Progressing  6/16/2025 2240 by Augie Miles RN  Outcome: Progressing     Problem: Gastrointestinal - Adult  Goal: Minimal or absence of nausea and vomiting  6/17/2025 1042 by Rosalina Hu RN  Outcome: Progressing  6/16/2025 2240 by Augie Miles RN  Outcome: Progressing  Goal: Maintains or returns to baseline bowel function  6/17/2025 1042 by Rosalina Hu RN  Outcome: Progressing  6/16/2025 2240 by Augie Miles RN  Outcome: Progressing  Goal: Maintains adequate nutritional intake  6/17/2025 1042 by Rosalina Hu RN  Outcome: Progressing  6/16/2025 2240 by Augie Miles RN  Outcome: Progressing     Problem: Metabolic/Fluid

## 2025-06-17 NOTE — PROGRESS NOTES
Spiritual Health History and Assessment/Progress Note  [facility]    Spiritual/Emotional Needs, Initial Encounter,  ,  ,      Name: Juliette Saba MRN: [id number]    Age: 30 y.o.     Sex: female   Language: English   Adventist: Non-Jewish   DKA, type 2, not at goal (HCC)     Date: 6/17/2025            Total Time Calculated: 10 min              Spiritual Assessment began in [facility] 2 PROGRESSIVE CARE            Encounter Overview/Reason: Spiritual/Emotional Needs, Initial Encounter  Service Provided For: Patient    Sonal, Belief, Meaning:   Patient is connected with a sonal tradition or spiritual practice  Family/Friends No family/friends present      Importance and Influence:  Patient has no beliefs influential to healthcare decision-making identified during this visit  Family/Friends No family/friends present    Community:  Patient feels well-supported. Support system includes: Unknown  Family/Friends No family/friends present    Assessment and Plan of Care:     Patient Interventions include: Facilitated expression of thoughts and feelings  Family/Friends Interventions include: No family/friends present    Patient Plan of Care: Spiritual Care available upon further referral  Family/Friends Plan of Care: No family/friends present    Electronically signed by SURESH Ohara on 6/17/2025 at 11:52 AM

## 2025-06-17 NOTE — PROGRESS NOTES
Comprehensive Nutrition Assessment    Type and Reason for Visit: Initial    Nutrition Recommendations/Plan:   As medically appropriate advance diet to CC diet with 4 CHO choices per meal tray  Extra protein and vegetables on meal trays to help meet ~ needs @ ABW.  Optimize glycemic control       Malnutrition Assessment:  Malnutrition Status:  No malnutrition (06/17/25 0917)           Nutrition Assessment:    Pt admitted with DKA, n/v; came to ED earlier and left AMA then returned and was admitted.  Remains NPO.  Hx notable for type 2 DM (insulin dependent) with A1c > 14%, gross medication (takes insulin 4 of 7 days and nutrition noncompliance (conts to drink multiple sugared sodas per day), opioid dependence on methadone, GERD, HTN, HLD, obesity.  Pt does not monitor her blood sugar, is not interested in diet education for diabetes.    Nutrition Related Findings:    Pt NPO since admission, meds: insulin, protonix, methadone, morphine Wound Type: Moisture Associate Skin Damage       Lab Results   Component Value Date/Time     06/17/2025 04:14 AM    K 3.7 06/17/2025 04:14 AM     06/17/2025 04:14 AM    CO2 19 06/17/2025 04:14 AM    BUN 3 06/17/2025 04:14 AM    CREATININE 0.59 06/17/2025 04:14 AM    GLUCOSE 185 06/17/2025 04:14 AM    CALCIUM 9.0 06/17/2025 04:14 AM    PHOS 2.5 06/17/2025 04:14 AM    MG 1.8 06/17/2025 04:14 AM          Estimated Daily Nutrient Needs:  Energy Requirements Based On: Kcal/kg  Weight Used for Energy Requirements: Adjusted  Energy (kcal/day): 2130  Weight Used for Protein Requirements: Adjusted  Protein (g/day): 120  Method Used for Fluid Requirements: Other (MD order)  Fluid (ml/day): 4800    Nutrition Related Findings:   Edema: None                    Last BM: 06/15/25    Wounds:   Wound Type: Moisture Associate Skin Damage      Current Nutrition Intake & Therapies:    Average Meal Intake: NPO  Average Supplements Intake: None Ordered  Diet NPO Exceptions are: Sips of Water with

## 2025-06-17 NOTE — PLAN OF CARE
Problem: Chronic Conditions and Co-morbidities  Goal: Patient's chronic conditions and co-morbidity symptoms are monitored and maintained or improved  Outcome: Progressing     Problem: Discharge Planning  Goal: Discharge to home or other facility with appropriate resources  Outcome: Progressing     Problem: Pain  Goal: Verbalizes/displays adequate comfort level or baseline comfort level  Outcome: Progressing     Problem: Safety - Adult  Goal: Free from fall injury  Outcome: Progressing     Problem: Neurosensory - Adult  Goal: Achieves stable or improved neurological status  Outcome: Progressing  Goal: Absence of seizures  Outcome: Progressing  Goal: Achieves maximal functionality and self care  Outcome: Progressing     Problem: Gastrointestinal - Adult  Goal: Minimal or absence of nausea and vomiting  Outcome: Progressing  Goal: Maintains or returns to baseline bowel function  Outcome: Progressing  Goal: Maintains adequate nutritional intake  Outcome: Progressing     Problem: Metabolic/Fluid and Electrolytes - Adult  Goal: Electrolytes maintained within normal limits  Outcome: Progressing  Goal: Hemodynamic stability and optimal renal function maintained  Outcome: Progressing  Goal: Glucose maintained within prescribed range  Outcome: Progressing     Problem: Anxiety  Goal: Will report anxiety at manageable levels  Description: INTERVENTIONS:  1. Administer medication as ordered  2. Teach and rehearse alternative coping skills  3. Provide emotional support with 1:1 interaction with staff  Outcome: Progressing     Problem: Drug Abuse/Detox  Goal: Will have no detox symptoms and will verbalize plan for changing drug-related behavior  Description: INTERVENTIONS:  1. Administer medication as ordered  2. Monitor physical status  3. Provide emotional support with 1:1 interaction with staff  4. Encourage  recovery focused treatment   Outcome: Progressing

## 2025-06-17 NOTE — PROGRESS NOTES
1900: Received handoff report  from APOORVA Ying.  Vital signs taken and recorded. Blood glucose checks done hourly. Pt. Lying on bed not in respiratory distress.       2300:. Repeat blood draw for metabolic panel and electrolytes taken. Potassium 3.3, Phosphorous 1.6, standing orders for electrolyte replacement initiated.     2034: Pt. Complained leg pain with scale of 8/10, referred to dr. Hernandez, Morphine 2mg IVTT given as a stat dose.     0000:  Reassessed pain score stated patient was satisfied and pain scale was 0/10. Patient resting on bed comfortably.        0300: Phosphorous drip consumed and terminated aseptically. .     0400: Potassium drip completed 4 cycles and terminated aseptically. Morning blood draw done and blood glucose checks hourly. Morning care done    0730: Handoff reports given to APOORVA norman

## 2025-06-17 NOTE — PROGRESS NOTES
[facility]                                                                               Hospitalist Progress Note  Felix Hernandez MD          Date of Service:  2025  NAME:  Juliette Saba  :  1995  MRN:  [id number]    Please note that this dictation was completed with [employer], the computer voice recognition software.  Quite often unanticipated grammatical, syntax, homophones, and other interpretive errors are inadvertently transcribed by the computer software.  Please disregard these errors.  Please excuse any errors that have escaped final proofreading.    Admission Summary:   CHIEF COMPLAINT:  Nausea  Vomiting Abdominal Pain   Per pt she was here last night and left instead of being admitted      Pt presents ambulatory to ED via EMS complaining of nausea, vomiting, headache and fatigue. Pt left [facility] AMA earlier this morning. She states that she is out of her methadone after missing her appointmen              HISTORY OF PRESENT ILLNESS:     Juliette Saba is a 30 y.o. female who presents cc abdominal pain nausea and vomiting.  States she vomited multiple times.  On chart review patient was evaluated in the emergency department earlier this morning.  Patient left AMA.  States the vomiting has persisted.  She reports generalized abdominal pain.  She states that she is methadone dependent but has not taken her medication due to the vomiting.  She denies fever.  She denies diarrhea.  She denies chest pain shortness of breath.           Review of Systems   Constitutional:  Negative for fever.   HENT:  Negative for congestion.    Eyes:  Negative for visual disturbance.   Respiratory:  Negative for shortness of breath.    Cardiovascular:  Negative for chest pain.   Gastrointestinal:  Positive for abdominal pain, nausea and vomiting.   Musculoskeletal:  Negative for back pain and neck pain.   Skin:  Negative for rash.   Neurological:  Negative for weakness and

## 2025-06-17 NOTE — CONSULTS
Sent a perfect serve message to Dr. Oliveira at 0723, pending response to confirm what time he can meet with the patient, virtually.   
proptosis   Ear/Nose/Throat: NCAT, no visible inflammation or masses   CARDIOVASCULAR: no cyanosis, no visible JVD   RESPIRATORY: comfortable respirations observed, no cyanosis   MUSCULOSKELETAL: Normal ROM of upper extremities observed   SKIN: No edema, rash, or other significant changes observed   NEUROLOGIC:  AAOx3   PSYCHIATRIC: Depressed affect, poor insight and judgement     Recent laboratory data  Glucose 140 (range 140-273)  Bicarb 14  Anion gap 14  Creatinine 0.78       Impression  1.  Type 2 diabetes mellitus with poor blood sugar control as evidenced by an A1c of greater than 14% with poor adherence to her antihyperglycemic regimen  2. Diabetic ketoacidosis currently on insulin infusion at a rate of 3.8 units an hour with a persistently low bicarb  3.  Depression  4.  Opioid dependence, currently on methadone    Recommendation  1.  I would continue the insulin infusion until her bicarb is greater than 18  2.  24 units of glargine insulin may be sufficient as her basal insulin dosing.  However based on her weight, I would anticipate closer to 30 to 36 units of basal insulins once daily.  3.  12 units of mealtime insulin would be appropriate.  4.  Follow-up with her primary care provider    Before making these care recommendations, I personally reviewed the hospitalization record, including notes, laboratory & diagnostic data and current medications, and examined the patient at the bedside. Total time  40   minutes,.      Please note that this dictation was completed with [employer], the computer voice recognition software.  Quite often unanticipated grammatical, syntax, homophones, and other interpretive errors are inadvertently transcribed by the computer software.  Please disregard these errors.  Please excuse any errors that have escaped final proofreading.    
Phosphorus 1.6 (*)     All other components within normal limits   BLOOD GAS, ARTERIAL - Abnormal; Notable for the following components:    pH, Arterial 7.31 (*)     pCO2, Arterial 26 (*)     pO2, Arterial 64 (*)     POC O2 SAT 91 (*)     HCO3, Arterial 13 (*)     All other components within normal limits   BASIC METABOLIC PANEL - Abnormal; Notable for the following components:    CO2 19 (*)     Glucose 185 (*)     BUN 3 (*)     BUN/Creatinine Ratio 5 (*)     All other components within normal limits   PHOSPHORUS - Abnormal; Notable for the following components:    Phosphorus 2.5 (*)     All other components within normal limits   CBC WITH AUTO DIFFERENTIAL - Abnormal; Notable for the following components:    WBC 12.2 (*)     Lymphocytes Absolute 4.59 (*)     All other components within normal limits   BASIC METABOLIC PANEL - Abnormal; Notable for the following components:    CO2 18 (*)     Glucose 174 (*)     BUN 3 (*)     Creatinine 0.34 (*)     BUN/Creatinine Ratio 9 (*)     Calcium 8.2 (*)     All other components within normal limits   PHOSPHORUS - Abnormal; Notable for the following components:    Phosphorus 2.4 (*)     All other components within normal limits   POCT GLUCOSE - Abnormal; Notable for the following components:    POC Glucose 234 (*)     All other components within normal limits   POCT GLUCOSE - Abnormal; Notable for the following components:    POC Glucose 261 (*)     All other components within normal limits   POCT GLUCOSE - Abnormal; Notable for the following components:    POC Glucose 221 (*)     All other components within normal limits   POCT GLUCOSE - Abnormal; Notable for the following components:    POC Glucose 226 (*)     All other components within normal limits   POCT GLUCOSE - Abnormal; Notable for the following components:    POC Glucose 206 (*)     All other components within normal limits   POCT GLUCOSE - Abnormal; Notable for the following components:    POC Glucose 187 (*)

## 2025-06-18 VITALS
WEIGHT: 240 LBS | HEART RATE: 80 BPM | TEMPERATURE: 98.1 F | OXYGEN SATURATION: 99 % | DIASTOLIC BLOOD PRESSURE: 80 MMHG | RESPIRATION RATE: 18 BRPM | HEIGHT: 67 IN | BODY MASS INDEX: 37.67 KG/M2 | SYSTOLIC BLOOD PRESSURE: 130 MMHG

## 2025-06-18 LAB
ANION GAP SERPL CALC-SCNC: 15 MMOL/L (ref 2–12)
BASOPHILS # BLD: 0.05 K/UL (ref 0–0.1)
BASOPHILS NFR BLD: 0.5 % (ref 0–1)
BUN SERPL-MCNC: 2 MG/DL (ref 6–20)
BUN/CREAT SERPL: 3 (ref 12–20)
CALCIUM SERPL-MCNC: 8.4 MG/DL (ref 8.5–10.1)
CHLORIDE SERPL-SCNC: 108 MMOL/L (ref 97–108)
CO2 SERPL-SCNC: 22 MMOL/L (ref 21–32)
CREAT SERPL-MCNC: 0.72 MG/DL (ref 0.55–1.02)
DIFFERENTIAL METHOD BLD: ABNORMAL
EOSINOPHIL # BLD: 0.09 K/UL (ref 0–0.4)
EOSINOPHIL NFR BLD: 0.9 % (ref 0–7)
ERYTHROCYTE [DISTWIDTH] IN BLOOD BY AUTOMATED COUNT: 13.6 % (ref 11.5–14.5)
GLUCOSE BLD STRIP.AUTO-MCNC: 181 MG/DL (ref 65–117)
GLUCOSE BLD STRIP.AUTO-MCNC: 215 MG/DL (ref 65–117)
GLUCOSE SERPL-MCNC: 213 MG/DL (ref 65–100)
HCT VFR BLD AUTO: 40.6 % (ref 35–47)
HGB BLD-MCNC: 14.1 G/DL (ref 11.5–16)
IMM GRANULOCYTES # BLD AUTO: 0.01 K/UL (ref 0–0.04)
IMM GRANULOCYTES NFR BLD AUTO: 0.1 % (ref 0–0.5)
LYMPHOCYTES # BLD: 4.17 K/UL (ref 0.8–3.5)
LYMPHOCYTES NFR BLD: 43.2 % (ref 12–49)
MCH RBC QN AUTO: 30.9 PG (ref 26–34)
MCHC RBC AUTO-ENTMCNC: 34.7 G/DL (ref 30–36.5)
MCV RBC AUTO: 88.8 FL (ref 80–99)
MONOCYTES # BLD: 0.76 K/UL (ref 0–1)
MONOCYTES NFR BLD: 7.9 % (ref 5–13)
NEUTS SEG # BLD: 4.57 K/UL (ref 1.8–8)
NEUTS SEG NFR BLD: 47.4 % (ref 32–75)
NRBC # BLD: 0 K/UL (ref 0–0.01)
NRBC BLD-RTO: 0 PER 100 WBC
PHOSPHATE SERPL-MCNC: 3.3 MG/DL (ref 2.6–4.7)
PLATELET # BLD AUTO: 219 K/UL (ref 150–400)
PMV BLD AUTO: 10.9 FL (ref 8.9–12.9)
POTASSIUM SERPL-SCNC: 3.9 MMOL/L (ref 3.5–5.1)
RBC # BLD AUTO: 4.57 M/UL (ref 3.8–5.2)
SERVICE CMNT-IMP: ABNORMAL
SERVICE CMNT-IMP: ABNORMAL
SODIUM SERPL-SCNC: 145 MMOL/L (ref 136–145)
WBC # BLD AUTO: 9.7 K/UL (ref 3.6–11)

## 2025-06-18 PROCEDURE — 6370000000 HC RX 637 (ALT 250 FOR IP): Performed by: INTERNAL MEDICINE

## 2025-06-18 PROCEDURE — 84100 ASSAY OF PHOSPHORUS: CPT

## 2025-06-18 PROCEDURE — 82962 GLUCOSE BLOOD TEST: CPT

## 2025-06-18 PROCEDURE — 2500000003 HC RX 250 WO HCPCS: Performed by: HOSPITALIST

## 2025-06-18 PROCEDURE — 80048 BASIC METABOLIC PNL TOTAL CA: CPT

## 2025-06-18 PROCEDURE — 85025 COMPLETE CBC W/AUTO DIFF WBC: CPT

## 2025-06-18 PROCEDURE — 36415 COLL VENOUS BLD VENIPUNCTURE: CPT

## 2025-06-18 PROCEDURE — 6360000002 HC RX W HCPCS: Performed by: HOSPITALIST

## 2025-06-18 RX ORDER — NICOTINE 21 MG/24HR
1 PATCH, TRANSDERMAL 24 HOURS TRANSDERMAL DAILY
Qty: 30 PATCH | Refills: 0 | Status: SHIPPED | OUTPATIENT
Start: 2025-06-19

## 2025-06-18 RX ORDER — INSULIN GLARGINE 100 [IU]/ML
27 INJECTION, SOLUTION SUBCUTANEOUS DAILY
Status: DISCONTINUED | OUTPATIENT
Start: 2025-06-18 | End: 2025-06-18 | Stop reason: HOSPADM

## 2025-06-18 RX ORDER — INSULIN LISPRO 100 [IU]/ML
INJECTION, SOLUTION INTRAVENOUS; SUBCUTANEOUS
Qty: 15 ML | Refills: 0 | Status: SHIPPED | OUTPATIENT
Start: 2025-06-18

## 2025-06-18 RX ORDER — GLUCOSAMINE HCL/CHONDROITIN SU 500-400 MG
CAPSULE ORAL
Qty: 150 STRIP | Refills: 0 | Status: SHIPPED | OUTPATIENT
Start: 2025-06-18

## 2025-06-18 RX ORDER — AVOBENZONE, HOMOSALATE, OCTISALATE, OCTOCRYLENE 30; 40; 45; 26 MG/ML; MG/ML; MG/ML; MG/ML
1 CREAM TOPICAL 3 TIMES DAILY
Qty: 200 EACH | Refills: 0 | Status: SHIPPED | OUTPATIENT
Start: 2025-06-18

## 2025-06-18 RX ORDER — METHADONE HYDROCHLORIDE 10 MG/ML
170 CONCENTRATE ORAL DAILY
Refills: 0 | Status: DISCONTINUED | OUTPATIENT
Start: 2025-06-19 | End: 2025-06-18 | Stop reason: HOSPADM

## 2025-06-18 RX ORDER — INSULIN GLARGINE 100 [IU]/ML
10 INJECTION, SOLUTION SUBCUTANEOUS NIGHTLY
Qty: 6 ML | Refills: 0 | Status: SHIPPED | OUTPATIENT
Start: 2025-06-18

## 2025-06-18 RX ORDER — QUETIAPINE FUMARATE 100 MG/1
100 TABLET, FILM COATED ORAL
Qty: 30 TABLET | Refills: 0 | Status: SHIPPED | OUTPATIENT
Start: 2025-06-18

## 2025-06-18 RX ORDER — INSULIN LISPRO 100 [IU]/ML
9 INJECTION, SOLUTION INTRAVENOUS; SUBCUTANEOUS
Status: DISCONTINUED | OUTPATIENT
Start: 2025-06-18 | End: 2025-06-18 | Stop reason: HOSPADM

## 2025-06-18 RX ADMIN — INSULIN LISPRO 8 UNITS: 100 INJECTION, SOLUTION INTRAVENOUS; SUBCUTANEOUS at 07:58

## 2025-06-18 RX ADMIN — METHADONE HYDROCHLORIDE 170 MG: 10 CONCENTRATE ORAL at 11:24

## 2025-06-18 RX ADMIN — SODIUM CHLORIDE 40 MG: 9 INJECTION INTRAMUSCULAR; INTRAVENOUS; SUBCUTANEOUS at 07:58

## 2025-06-18 RX ADMIN — ENOXAPARIN SODIUM 30 MG: 100 INJECTION SUBCUTANEOUS at 08:04

## 2025-06-18 RX ADMIN — INSULIN LISPRO 2 UNITS: 100 INJECTION, SOLUTION INTRAVENOUS; SUBCUTANEOUS at 12:20

## 2025-06-18 RX ADMIN — INSULIN LISPRO 9 UNITS: 100 INJECTION, SOLUTION INTRAVENOUS; SUBCUTANEOUS at 12:21

## 2025-06-18 RX ADMIN — INSULIN LISPRO 2 UNITS: 100 INJECTION, SOLUTION INTRAVENOUS; SUBCUTANEOUS at 07:58

## 2025-06-18 RX ADMIN — INSULIN GLARGINE 27 UNITS: 100 INJECTION, SOLUTION SUBCUTANEOUS at 08:59

## 2025-06-18 ASSESSMENT — PAIN DESCRIPTION - ORIENTATION: ORIENTATION: RIGHT;LEFT

## 2025-06-18 ASSESSMENT — PAIN SCALES - GENERAL
PAINLEVEL_OUTOF10: 6
PAINLEVEL_OUTOF10: 0
PAINLEVEL_OUTOF10: 9
PAINLEVEL_OUTOF10: 0
PAINLEVEL_OUTOF10: 1

## 2025-06-18 ASSESSMENT — PAIN DESCRIPTION - FREQUENCY: FREQUENCY: CONTINUOUS

## 2025-06-18 ASSESSMENT — PAIN SCALES - WONG BAKER
WONGBAKER_NUMERICALRESPONSE: NO HURT

## 2025-06-18 ASSESSMENT — PAIN - FUNCTIONAL ASSESSMENT: PAIN_FUNCTIONAL_ASSESSMENT: ACTIVITIES ARE NOT PREVENTED

## 2025-06-18 ASSESSMENT — PAIN DESCRIPTION - LOCATION: LOCATION: GENERALIZED

## 2025-06-18 ASSESSMENT — PAIN DESCRIPTION - PAIN TYPE: TYPE: ACUTE PAIN

## 2025-06-18 ASSESSMENT — PAIN DESCRIPTION - DESCRIPTORS: DESCRIPTORS: ACHING

## 2025-06-18 NOTE — PROGRESS NOTES
Pharmacist Discharge Medication Reconciliation    Discharging Provider: Dr. Myrno Willson    Discharge Medications:   Current Discharge Medication List        START taking these medications    Details   nicotine (NICODERM CQ) 21 MG/24HR Place 1 patch onto the skin daily  Qty: 30 patch, Refills: 0      insulin lispro, 1 Unit Dial, (HUMALOG KWIKPEN) 100 UNIT/ML SOPN Inject per sliding scale 3 times daily (before meals): BG : 9 units insulin. -249: 10 units. -299: 11 units. -349: 12 units. BG over 349: 13 units  Qty: 15 mL, Refills: 0      blood glucose monitor strips Test 3 times a day & as needed for symptoms of irregular blood glucose. Dispense sufficient amount for indicated testing frequency plus additional to accommodate PRN testing needs. 150 test strips for 30 day supply  Qty: 150 strip, Refills: 0    Comments: (1) Identify specific brand and product.  (2) Include specific quantity.  (3) Include frequency.      Lancets MISC 1 each by Does not apply route 3 times daily  Qty: 200 each, Refills: 0      Insulin Pen Needle 32G X 4 MM MISC 1 each by Does not apply route daily  Qty: 100 each, Refills: 3           CONTINUE these medications which have CHANGED    Details   QUEtiapine (SEROQUEL) 100 MG tablet Take 1 tablet by mouth nightly  Qty: 30 tablet, Refills: 0      insulin glargine (LANTUS SOLOSTAR) 100 UNIT/ML injection pen Inject 10 Units into the skin nightly  Qty: 6 mL, Refills: 0           CONTINUE these medications which have NOT CHANGED    Details   gabapentin (NEURONTIN) 600 MG tablet Take 1 tablet by mouth 2 times daily. Max Daily Amount: 1,200 mg      methadone (DOLOPHINE) 10 MG/ML solution Take 17 mLs by mouth daily.      acetaminophen (TYLENOL) 500 MG tablet Take 2 tablets by mouth every 6 hours as needed for Pain  Qty: 40 tablet, Refills: 0           STOP taking these medications       ondansetron (ZOFRAN-ODT) 4 MG disintegrating tablet Comments:   Reason for Stopping:

## 2025-06-18 NOTE — PLAN OF CARE
Problem: Chronic Conditions and Co-morbidities  Goal: Patient's chronic conditions and co-morbidity symptoms are monitored and maintained or improved  6/18/2025 0943 by Amanda Nails RN  Outcome: Progressing  6/18/2025 0943 by Amanda Nails RN  Outcome: Progressing  6/17/2025 2202 by Rosalina Gonsalves RN  Outcome: Progressing     Problem: Discharge Planning  Goal: Discharge to home or other facility with appropriate resources  6/18/2025 0943 by Amanda Nails RN  Outcome: Progressing  6/18/2025 0943 by Amanda Nails RN  Outcome: Progressing  6/17/2025 2202 by Rosalina Gonsalves RN  Outcome: Progressing     Problem: Pain  Goal: Verbalizes/displays adequate comfort level or baseline comfort level  6/18/2025 0943 by Amanda Nails RN  Outcome: Progressing  6/18/2025 0943 by Amanda Nails, RN  Outcome: Progressing  6/17/2025 2202 by Rosalina Gonsalves RN  Outcome: Progressing     Problem: Safety - Adult  Goal: Free from fall injury  6/18/2025 0943 by Amanda Nails RN  Outcome: Progressing  6/18/2025 0943 by Amanda aNils RN  Outcome: Progressing  6/17/2025 2202 by Rosalina Gonsalves RN  Outcome: Progressing     Problem: Neurosensory - Adult  Goal: Achieves stable or improved neurological status  6/18/2025 0943 by Amanda Nails RN  Outcome: Progressing  6/18/2025 0943 by Amanda Nails, RN  Outcome: Progressing  6/17/2025 2202 by Rosalina Gonsalves RN  Outcome: Progressing  Goal: Absence of seizures  6/18/2025 0943 by Amanda Nails RN  Outcome: Progressing  6/18/2025 0943 by Amanda Nails RN  Outcome: Progressing  6/17/2025 2202 by Rosalina Gonsalves RN  Outcome: Progressing  Goal: Achieves maximal functionality and self care  6/18/2025 0943 by Amanda Nails RN  Outcome: Progressing  6/18/2025 0943 by Amanda Nails, RN  Outcome: Progressing  6/17/2025 2202 by Rosalina Gonsalves, RN  Outcome: Progressing     Problem: Gastrointestinal - Adult  Goal: Minimal or absence of nausea and vomiting  6/18/2025 0931

## 2025-06-18 NOTE — DISCHARGE SUMMARY
Discharge Summary   Please note that this dictation was completed with Auto I[employer]D., the computer voice recognition software.  Quite often unanticipated grammatical, syntax, homophones, and other interpretive errors are inadvertently transcribed by the computer software.  Please disregard these errors.  Please excuse any errors that have escaped final proofreading.    PATIENT ID: Juliette Saba  MRN: [id number]   YOB: 1995    DATE OF ADMISSION: 6/15/2025  7:01 PM    DATE OF DISCHARGE: 6/18/2025  PRIMARY CARE PROVIDER: Apolinar Akhtar MD         ATTENDING PHYSICIAN: Myron Willson MD  DISCHARGING PROVIDER: Myron Willson MD       CONSULTATIONS: IP CONSULT TO DIABETES EDUCATOR  IP CONSULT TO PHARMACY  IP CONSULT TO ADDICTION MEDICINE  IP CONSULT TO PSYCHIATRY  IP CONSULT TO DIABETES MANAGEMENT    PROCEDURES/SURGERIES: * No surgery found *    ADMITTING HPI from excerpted H&P   Juliette Saba is a 30 y.o. female who presents cc abdominal pain nausea and vomiting.  States she vomited multiple times.  On chart review patient was evaluated in the emergency department earlier this morning.  Patient left AMA.  States the vomiting has persisted.  She reports generalized abdominal pain.  She states that she is methadone dependent but has not taken her medication due to the vomiting.  She denies fever.  She denies diarrhea.  She denies chest pain shortness of breath. Patient was admitted for DKA.      HOSPITAL COURSE & DISCHARGE DIAGNOSIS/ PLAN:     Diabetic Ketoacidosis  -Glucose 261, anion gap 23, bicarb 11, ketones positive  -s/p dka protocol IV fluids, AG closed, bicarb normalized, on diabetic diet, bridged to Lantas 27, Lispro 9   -patient demonstrated giving herself insulin to the nurse, demonstrated to nurse she is able to give herself insulin, nurse evaluated technique and approved, nurse provided additional education on diabetic diet and giving herself insulin.     Opioid use disorder  Currently at a

## 2025-06-18 NOTE — PLAN OF CARE
Problem: Chronic Conditions and Co-morbidities  Goal: Patient's chronic conditions and co-morbidity symptoms are monitored and maintained or improved  6/18/2025 0943 by Amanda Nails RN  Outcome: Progressing  6/17/2025 2202 by Rosalina Gonsalves RN  Outcome: Progressing     Problem: Discharge Planning  Goal: Discharge to home or other facility with appropriate resources  6/18/2025 0943 by Amanda Nails RN  Outcome: Progressing  6/17/2025 2202 by Rosalina Gonsalves RN  Outcome: Progressing     Problem: Pain  Goal: Verbalizes/displays adequate comfort level or baseline comfort level  6/18/2025 0943 by Amanda Nails RN  Outcome: Progressing  6/17/2025 2202 by Rosalina Gonsalves RN  Outcome: Progressing     Problem: Safety - Adult  Goal: Free from fall injury  6/18/2025 0943 by Amanda Nails RN  Outcome: Progressing  6/17/2025 2202 by Rosalina Gonsalves RN  Outcome: Progressing     Problem: Neurosensory - Adult  Goal: Achieves stable or improved neurological status  6/18/2025 0943 by Amanda Nails RN  Outcome: Progressing  6/17/2025 2202 by Rosalina Gonsalves RN  Outcome: Progressing  Goal: Absence of seizures  6/18/2025 0943 by Amanda Nails RN  Outcome: Progressing  6/17/2025 2202 by Rosalina Gonsalves RN  Outcome: Progressing  Goal: Achieves maximal functionality and self care  6/18/2025 0943 by Amanda Nails RN  Outcome: Progressing  6/17/2025 2202 by Rosalina Gonsalves RN  Outcome: Progressing     Problem: Gastrointestinal - Adult  Goal: Minimal or absence of nausea and vomiting  6/18/2025 0943 by Amanda Nails RN  Outcome: Progressing  6/17/2025 2202 by Rosalina Gonsalves RN  Outcome: Progressing  Goal: Maintains or returns to baseline bowel function  6/18/2025 0943 by Amanda Nails RN  Outcome: Progressing  6/17/2025 2202 by Rosalina Gonsalves RN  Outcome: Progressing  Goal: Maintains adequate nutritional intake  6/18/2025 0943 by Nails, Amanda, RN  Outcome: Progressing  6/17/2025 3036

## 2025-06-18 NOTE — BSMART NOTE
BSMART Liaison Team Note     LOS:  3     Patient goal(s) for today: take medications as prescribed, make needs known in an appropriate manner  [facility] Liaison team focus/goals: assess needs, provide support and education, coordinate care    Progress note: Attempted to assess p[patient at care manager, primary nurse, and physician request. Patient was sitting up in bed with her eyes closed. When this writer knocked she opened her eyes and looked over. She greeted this writer. Patient reports that she is tired and just wants to sleep. She was asked to answer a few questions and then this writer would leave. Patient agreed. Patient reports she is doing okay today and denies SI, HI, and hallucinations. She does reports anxiety and depression but reports taking her medication which helps. She reports having a psychiatrist but can not remember the name. She reports being tired an just wanting to sleep. She denies any mental health concerns and reports not needing assistance with finding a provider. Patient appeared tired and struggled to keep her eyes open. She repeatedly asked to be allowed to sleep stating she was tired but reports sleeping well last night. Updated primary nurse and team that patient declined to complete assessment and therapy but did answer some questions before falling asleep and not engaging further.     Admission Status: Voluntary    Barriers to Discharge: medical  Insurance info/prescription coverage:  [facility]   Outpatient provider(s):  yes but unable to recall name    Diagnosis:   Past Medical History:   Diagnosis Date    Anxiety     Depression     Diabetes (HCC)     GERD (gastroesophageal reflux disease)     Heavy menses     pt states off and on x 1 year    High cholesterol     HSV-2 infection     Hypertension     Opioid use disorder     Tobacco use disorder         Plan:  Defer to medical team. Psych consult pending. Liaison team to provide support as needed during

## 2025-06-18 NOTE — PLAN OF CARE
Problem: Chronic Conditions and Co-morbidities  Goal: Patient's chronic conditions and co-morbidity symptoms are monitored and maintained or improved  6/17/2025 2202 by Rosalina Gonsalves RN  Outcome: Progressing  6/17/2025 1042 by Rosalina Hu RN  Outcome: Progressing     Problem: Discharge Planning  Goal: Discharge to home or other facility with appropriate resources  6/17/2025 2202 by Rosalina Gonsalves RN  Outcome: Progressing  6/17/2025 1042 by Rosalina Hu RN  Outcome: Progressing     Problem: Pain  Goal: Verbalizes/displays adequate comfort level or baseline comfort level  6/17/2025 2202 by Rosalina Gonsalves RN  Outcome: Progressing  6/17/2025 1042 by Rosalina Hu RN  Outcome: Progressing     Problem: Safety - Adult  Goal: Free from fall injury  6/17/2025 2202 by Rosalina Gonsalves RN  Outcome: Progressing  6/17/2025 1042 by Rosalina Hu RN  Outcome: Progressing     Problem: Neurosensory - Adult  Goal: Achieves stable or improved neurological status  6/17/2025 2202 by Rosalina Gonsalves RN  Outcome: Progressing  6/17/2025 1042 by Rosalina Hu RN  Outcome: Progressing  Goal: Absence of seizures  6/17/2025 2202 by Rosalina Gonsalves RN  Outcome: Progressing  6/17/2025 1042 by Rosalina Hu RN  Outcome: Progressing  Goal: Achieves maximal functionality and self care  6/17/2025 2202 by Rosalina Gonsalves RN  Outcome: Progressing  6/17/2025 1042 by Rosalina Hu RN  Outcome: Progressing     Problem: Gastrointestinal - Adult  Goal: Minimal or absence of nausea and vomiting  6/17/2025 2202 by Rosalina Gonsalves RN  Outcome: Progressing  6/17/2025 1042 by Rosalina Hu RN  Outcome: Progressing  Goal: Maintains or returns to baseline bowel function  6/17/2025 2202 by Rosalina Gonsalves RN  Outcome: Progressing  6/17/2025 1042 by Rosalina Hu RN  Outcome: Progressing  Goal: Maintains adequate nutritional intake  6/17/2025 2202 by Rosalina Gonsalves, RN  Outcome:

## 2025-06-18 NOTE — PROGRESS NOTES
0700.Bedside and verbal shift report given to Amanda RN (oncoming nurse) from .....Lucia Rn (offgoing nurse). Information discussed includes adult overview, MAR and recent lab results. Patient received ...     0800.Pt assess and was educated by this Rn on diet and BGL mgmt. Pt was also educated on the importance of insulin protocol and adherence.  Meds and meals given and tolerated well.Methadone given as ordered, see eMAR    1000) Psychiatry  consult ordered per care mgmt team, but was refused by pt.    1130) Teach back completed regarding insulin ordered, and mgmt. Pt was educated on the importance of med mgmt.     1245.Patient was discharged from the unit at this time in no acute signs of distress with paperwork and discharge education.  Patient was provided with opportunities to ask questions regarding paperwork.  Patient was informed of medications ordered, where they could be picked up, and follow up appointments. IV was removed at this time, patient verbalized understanding of discharge paperwork and has left the department.

## 2025-06-18 NOTE — DISCHARGE INSTRUCTIONS
Dear MsLucas Jayant    You were admitted to [facility] due to nausea/vomiting and abdominal pain. Upon further evaluation, it was determined that you a serious illness known as diabetic ketoacidosis (DKA). DKA occurs when an excessive amount of sugar is present in your bloodstream, but it cannot enter your cells. As a result, your body cannot utilize glucose for energy, leading to increased blood sugar levels as glucose accumulates in your bloodstream. Consequently, your body starts breaking down fat to produce ketones, which your brain can use. However, an excessive buildup of ketones causes the acidity in your blood to rise, leading to DKA. DKA is a serious diabetes complication and can be life-threatening. It is crucial that you take your diabetes medications daily and schedule follow-up appointments with your primary care physician and an endocrinologist. During your hospital stay, you received fluids and insulin to stabilize your blood sugar levels, underwent diabetes education, and your diabetes medications were sent to your pharmacy. You are now stable and ready for discharge. Please ensure you follow up with your primary care physician within 7 days of discharge. We appreciate you choosing [facility] for your healthcare needs.    It was our privilege to care for you in the Medical/Surgical Department today. When you choose to visit our 2nd-floor Medical/Surgical Department, you entrust us with your health, comfort, and safety. Our physicians and nurses deeply honor that trust and truly appreciate the opportunity to care for you and your loved ones.    We value your feedback and encourage you to complete any survey you may receive about your experience in the Medical/Surgical Department today. Your input is important to us, and we are committed to listening and learning from what you have to share.    Thank you for choosing us for your care.

## 2025-06-18 NOTE — CARE COORDINATION
Transition of Care Plan:    RUR: 8%  Prior Level of Functioning: IND  Disposition: HOME  POOJA: 6/18  If SNF or IPR: Date FOC offered: N/A     Follow up appointments: ON AVS  DME needed: N/A APTIENT STATED SHE HAS GLUCOMETER  Transportation at discharge: FAMILY  IM/IMM Medicare/ letter given: N/A  Is patient a [place] and connected with [facility]? N/A  Caregiver Contact: N/A Patient did not want anyone to be contacted unless urgent  Discharge Caregiver contacted prior to discharge? N/A  Care Conference needed? N/A  Barriers to discharge: N/A                  [facility]     [address] [place] [address] [phone]     Next Steps: Follow up  Instructions: Please follow-up  with your continue treatment and support    Verfication Methadone given while in the hospital     methadone (DOLOPHINE) 10 MG/ML solution 170 mg     Next Steps: Follow up    Apolinar Akhtar MD PCP - General Family Medicine [phone] [address] [facility] [address]     Next Steps: Follow up on 6/25/2025  Instructions: PLEASE ATTEND YOUR POST HOSPITAL FOLLOW UP AT 11:15 am ARRIVE 15 MINUTES BEFORE APPT.       DAYANARA LUNDY RN CM

## (undated) DEVICE — CATHETER,URETHRAL,REDRUBBER,STRL,14FR: Brand: MEDLINE INDUSTRIES, INC.

## (undated) DEVICE — SWAB MEDICATED 16 IN RAYON TIP PLAS HNDL OVERSIZED NS

## (undated) DEVICE — SOUTHSIDE TURNOVER: Brand: MEDLINE INDUSTRIES, INC.

## (undated) DEVICE — NEEDLE SPNL 18GA L3.5IN W/ QNCKE SHARPER BVL DURA CLICK

## (undated) DEVICE — SYRINGE MED 10ML LUERLOCK TIP W/O SFTY DISP

## (undated) DEVICE — TUBING, SUCTION, 1/4" X 12', STRAIGHT: Brand: MEDLINE

## (undated) DEVICE — ELECTRODE BALL DIA5MM TUNGSTEN LLETZ DURABLE RESIST

## (undated) DEVICE — ELECTRODE LOOP 10X10MM SHFT L5IN DIA3/32IN STD LEEP LLETZ

## (undated) DEVICE — GLOVE ORANGE PI 7 1/2   MSG9075

## (undated) DEVICE — ELECTRODE PT RET AD L9FT HI MOIST COND ADH HYDRGEL CORDED

## (undated) DEVICE — SOLUTION IRRIG 500ML 0.9% SOD CHLO USP POUR PLAS BTL

## (undated) DEVICE — MINOR VAGINAL PACK: Brand: MEDLINE INDUSTRIES, INC.

## (undated) DEVICE — YANKAUER,BULB TIP,W/O VENT,RIGID,STERILE: Brand: MEDLINE

## (undated) DEVICE — PENCIL ES CRD L10FT HND SWCHING ROCK SWCH W/ EDGE COAT BLDE

## (undated) DEVICE — GARMENT,MEDLINE,DVT,INT,CALF,MED, GEN2: Brand: MEDLINE